# Patient Record
Sex: FEMALE | Race: WHITE | NOT HISPANIC OR LATINO | Employment: OTHER | URBAN - METROPOLITAN AREA
[De-identification: names, ages, dates, MRNs, and addresses within clinical notes are randomized per-mention and may not be internally consistent; named-entity substitution may affect disease eponyms.]

---

## 2020-03-12 ENCOUNTER — HISTORICAL (OUTPATIENT)
Dept: ADMINISTRATIVE | Facility: HOSPITAL | Age: 60
End: 2020-03-12

## 2020-03-15 LAB — FINAL CULTURE: NORMAL

## 2020-06-09 ENCOUNTER — HISTORICAL (OUTPATIENT)
Dept: ENDOSCOPY | Facility: HOSPITAL | Age: 60
End: 2020-06-09

## 2020-06-26 ENCOUNTER — HISTORICAL (OUTPATIENT)
Dept: SURGERY | Facility: HOSPITAL | Age: 60
End: 2020-06-26

## 2020-07-29 ENCOUNTER — HISTORICAL (OUTPATIENT)
Dept: ADMINISTRATIVE | Facility: HOSPITAL | Age: 60
End: 2020-07-29

## 2020-08-06 ENCOUNTER — HISTORICAL (OUTPATIENT)
Dept: RADIOLOGY | Facility: HOSPITAL | Age: 60
End: 2020-08-06

## 2020-10-21 ENCOUNTER — HISTORICAL (OUTPATIENT)
Dept: ADMINISTRATIVE | Facility: HOSPITAL | Age: 60
End: 2020-10-21

## 2021-03-08 ENCOUNTER — HISTORICAL (OUTPATIENT)
Dept: ADMINISTRATIVE | Facility: HOSPITAL | Age: 61
End: 2021-03-08

## 2021-03-08 LAB
APPEARANCE, UA: CLEAR
BACTERIA SPEC CULT: ABNORMAL /HPF
BILIRUB UR QL STRIP: NEGATIVE
COLOR UR: YELLOW
GLUCOSE (UA): NEGATIVE
HGB UR QL STRIP: ABNORMAL
KETONES UR QL STRIP: NEGATIVE
LEUKOCYTE ESTERASE UR QL STRIP: NEGATIVE
NITRITE UR QL STRIP: NEGATIVE
PH UR STRIP: 6.5 [PH] (ref 5–9)
PROT UR QL STRIP: NEGATIVE
RBC #/AREA URNS HPF: 7 /HPF (ref 0–2)
SP GR UR STRIP: 1.02 (ref 1–1.03)
SQUAMOUS EPITHELIAL, UA: ABNORMAL
UROBILINOGEN UR STRIP-ACNC: 0.2
WBC #/AREA URNS HPF: ABNORMAL /[HPF]

## 2021-04-14 ENCOUNTER — HISTORICAL (OUTPATIENT)
Dept: RADIOLOGY | Facility: HOSPITAL | Age: 61
End: 2021-04-14

## 2021-04-16 ENCOUNTER — HISTORICAL (OUTPATIENT)
Dept: ADMINISTRATIVE | Facility: HOSPITAL | Age: 61
End: 2021-04-16

## 2021-04-16 LAB
ABS NEUT (OLG): 2.12 X10(3)/MCL (ref 2.1–9.2)
ALBUMIN SERPL-MCNC: 4.4 GM/DL (ref 3.5–5)
ALBUMIN/GLOB SERPL: 1.8 RATIO (ref 1.1–2)
ALP SERPL-CCNC: 76 UNIT/L (ref 40–150)
ALT SERPL-CCNC: 12 UNIT/L (ref 0–55)
AST SERPL-CCNC: 20 UNIT/L (ref 5–34)
BASOPHILS # BLD AUTO: 0 X10(3)/MCL (ref 0–0.2)
BASOPHILS NFR BLD AUTO: 1 %
BILIRUB SERPL-MCNC: 0.4 MG/DL
BILIRUBIN DIRECT+TOT PNL SERPL-MCNC: 0.2 MG/DL (ref 0–0.5)
BILIRUBIN DIRECT+TOT PNL SERPL-MCNC: 0.2 MG/DL (ref 0–0.8)
BUN SERPL-MCNC: 14.7 MG/DL (ref 9.8–20.1)
CALCIUM SERPL-MCNC: 9.7 MG/DL (ref 8.4–10.2)
CHLORIDE SERPL-SCNC: 109 MMOL/L (ref 98–107)
CHOLEST SERPL-MCNC: 200 MG/DL
CHOLEST/HDLC SERPL: 2 {RATIO} (ref 0–5)
CK SERPL-CCNC: 82 U/L (ref 29–168)
CO2 SERPL-SCNC: 28 MMOL/L (ref 22–29)
CREAT SERPL-MCNC: 0.74 MG/DL (ref 0.55–1.02)
CRP SERPL HS-MCNC: 0.22 MG/DL
EOSINOPHIL # BLD AUTO: 0.2 X10(3)/MCL (ref 0–0.9)
EOSINOPHIL NFR BLD AUTO: 4 %
ERYTHROCYTE [DISTWIDTH] IN BLOOD BY AUTOMATED COUNT: 13.7 % (ref 11.5–17)
ERYTHROCYTE [SEDIMENTATION RATE] IN BLOOD: 5 MM/HR (ref 0–20)
GLOBULIN SER-MCNC: 2.4 GM/DL (ref 2.4–3.5)
GLUCOSE SERPL-MCNC: 82 MG/DL (ref 74–100)
HCT VFR BLD AUTO: 42.3 % (ref 37–47)
HDLC SERPL-MCNC: 92 MG/DL (ref 35–60)
HGB BLD-MCNC: 13.5 GM/DL (ref 12–16)
LDLC SERPL CALC-MCNC: 98 MG/DL (ref 50–140)
LYMPHOCYTES # BLD AUTO: 1.6 X10(3)/MCL (ref 0.6–4.6)
LYMPHOCYTES NFR BLD AUTO: 36 %
MCH RBC QN AUTO: 30.3 PG (ref 27–31)
MCHC RBC AUTO-ENTMCNC: 31.9 GM/DL (ref 33–36)
MCV RBC AUTO: 94.8 FL (ref 80–94)
MONOCYTES # BLD AUTO: 0.4 X10(3)/MCL (ref 0.1–1.3)
MONOCYTES NFR BLD AUTO: 8 %
NEUTROPHILS # BLD AUTO: 2.12 X10(3)/MCL (ref 2.1–9.2)
NEUTROPHILS NFR BLD AUTO: 50 %
PLATELET # BLD AUTO: 303 X10(3)/MCL (ref 130–400)
PMV BLD AUTO: 9.3 FL (ref 9.4–12.4)
POTASSIUM SERPL-SCNC: 4.7 MMOL/L (ref 3.5–5.1)
PROT SERPL-MCNC: 6.8 GM/DL (ref 6.4–8.3)
RBC # BLD AUTO: 4.46 X10(6)/MCL (ref 4.2–5.4)
SODIUM SERPL-SCNC: 145 MMOL/L (ref 136–145)
TRIGL SERPL-MCNC: 48 MG/DL (ref 37–140)
VLDLC SERPL CALC-MCNC: 10 MG/DL
WBC # SPEC AUTO: 4.2 X10(3)/MCL (ref 4.5–11.5)

## 2021-05-07 ENCOUNTER — HISTORICAL (OUTPATIENT)
Dept: RADIOLOGY | Facility: HOSPITAL | Age: 61
End: 2021-05-07

## 2021-06-04 ENCOUNTER — HISTORICAL (OUTPATIENT)
Dept: RADIOLOGY | Facility: HOSPITAL | Age: 61
End: 2021-06-04

## 2021-06-04 ENCOUNTER — HISTORICAL (OUTPATIENT)
Dept: ADMINISTRATIVE | Facility: HOSPITAL | Age: 61
End: 2021-06-04

## 2021-06-04 LAB
ABS NEUT (OLG): 2.33 X10(3)/MCL (ref 2.1–9.2)
APPEARANCE, UA: CLEAR
BACTERIA SPEC CULT: NORMAL /HPF
BASOPHILS # BLD AUTO: 0 X10(3)/MCL (ref 0–0.2)
BASOPHILS NFR BLD AUTO: 0 %
BILIRUB UR QL STRIP: NEGATIVE
COLOR UR: YELLOW
EOSINOPHIL # BLD AUTO: 0.2 X10(3)/MCL (ref 0–0.9)
EOSINOPHIL NFR BLD AUTO: 4 %
ERYTHROCYTE [DISTWIDTH] IN BLOOD BY AUTOMATED COUNT: 13.9 % (ref 11.5–17)
GLUCOSE (UA): NEGATIVE
HCT VFR BLD AUTO: 42 % (ref 37–47)
HGB BLD-MCNC: 13.6 GM/DL (ref 12–16)
HGB UR QL STRIP: NEGATIVE
KETONES UR QL STRIP: NEGATIVE
LEUKOCYTE ESTERASE UR QL STRIP: NEGATIVE
LYMPHOCYTES # BLD AUTO: 1.6 X10(3)/MCL (ref 0.6–4.6)
LYMPHOCYTES NFR BLD AUTO: 35 %
MCH RBC QN AUTO: 29.8 PG (ref 27–31)
MCHC RBC AUTO-ENTMCNC: 32.4 GM/DL (ref 33–36)
MCV RBC AUTO: 91.9 FL (ref 80–94)
MONOCYTES # BLD AUTO: 0.4 X10(3)/MCL (ref 0.1–1.3)
MONOCYTES NFR BLD AUTO: 9 %
NEUTROPHILS # BLD AUTO: 2.33 X10(3)/MCL (ref 2.1–9.2)
NEUTROPHILS NFR BLD AUTO: 52 %
NITRITE UR QL STRIP: NEGATIVE
PH UR STRIP: 6 [PH] (ref 5–9)
PLATELET # BLD AUTO: 326 X10(3)/MCL (ref 130–400)
PMV BLD AUTO: 9.6 FL (ref 9.4–12.4)
PROT UR QL STRIP: NEGATIVE
RBC # BLD AUTO: 4.57 X10(6)/MCL (ref 4.2–5.4)
RBC #/AREA URNS HPF: NORMAL /[HPF]
SP GR UR STRIP: 1 (ref 1–1.03)
SQUAMOUS EPITHELIAL, UA: NORMAL /HPF (ref 0–4)
UROBILINOGEN UR STRIP-ACNC: 0.2
WBC # SPEC AUTO: 4.5 X10(3)/MCL (ref 4.5–11.5)
WBC #/AREA URNS HPF: NORMAL /[HPF]

## 2021-06-07 ENCOUNTER — HISTORICAL (OUTPATIENT)
Dept: RADIOLOGY | Facility: HOSPITAL | Age: 61
End: 2021-06-07

## 2021-06-09 ENCOUNTER — HISTORICAL (OUTPATIENT)
Dept: ADMINISTRATIVE | Facility: HOSPITAL | Age: 61
End: 2021-06-09

## 2021-06-09 LAB
ABS NEUT (OLG): 3.54 X10(3)/MCL (ref 2.1–9.2)
ALBUMIN SERPL-MCNC: 4.4 GM/DL (ref 3.5–5)
ALBUMIN/GLOB SERPL: 1.6 RATIO (ref 1.1–2)
ALP SERPL-CCNC: 72 UNIT/L (ref 40–150)
ALT SERPL-CCNC: 11 UNIT/L (ref 0–55)
AMYLASE SERPL-CCNC: 115 UNIT/L (ref 25–125)
APPEARANCE, UA: CLEAR
AST SERPL-CCNC: 19 UNIT/L (ref 5–34)
BACTERIA SPEC CULT: NORMAL /HPF
BASOPHILS # BLD AUTO: 0 X10(3)/MCL (ref 0–0.2)
BASOPHILS NFR BLD AUTO: 1 %
BILIRUB SERPL-MCNC: 0.3 MG/DL
BILIRUB UR QL STRIP: NEGATIVE
BILIRUBIN DIRECT+TOT PNL SERPL-MCNC: 0.1 MG/DL (ref 0–0.8)
BILIRUBIN DIRECT+TOT PNL SERPL-MCNC: 0.2 MG/DL (ref 0–0.5)
BUN SERPL-MCNC: 13.4 MG/DL (ref 9.8–20.1)
CALCIUM SERPL-MCNC: 10.7 MG/DL (ref 8.4–10.2)
CHLORIDE SERPL-SCNC: 102 MMOL/L (ref 98–107)
CO2 SERPL-SCNC: 30 MMOL/L (ref 22–29)
COLOR UR: YELLOW
CREAT SERPL-MCNC: 0.81 MG/DL (ref 0.55–1.02)
DEPRECATED CALCIDIOL+CALCIFEROL SERPL-MC: 37.9 NG/ML (ref 30–80)
EOSINOPHIL # BLD AUTO: 0.2 X10(3)/MCL (ref 0–0.9)
EOSINOPHIL NFR BLD AUTO: 2 %
ERYTHROCYTE [DISTWIDTH] IN BLOOD BY AUTOMATED COUNT: 13.8 % (ref 11.5–17)
GLOBULIN SER-MCNC: 2.8 GM/DL (ref 2.4–3.5)
GLUCOSE (UA): NEGATIVE
GLUCOSE SERPL-MCNC: 85 MG/DL (ref 74–100)
HCT VFR BLD AUTO: 42.7 % (ref 37–47)
HGB BLD-MCNC: 14.1 GM/DL (ref 12–16)
HGB UR QL STRIP: NEGATIVE
KETONES UR QL STRIP: NEGATIVE
LEUKOCYTE ESTERASE UR QL STRIP: NEGATIVE
LIPASE SERPL-CCNC: 53 U/L
LYMPHOCYTES # BLD AUTO: 1.8 X10(3)/MCL (ref 0.6–4.6)
LYMPHOCYTES NFR BLD AUTO: 29 %
MCH RBC QN AUTO: 30.3 PG (ref 27–31)
MCHC RBC AUTO-ENTMCNC: 33 GM/DL (ref 33–36)
MCV RBC AUTO: 91.8 FL (ref 80–94)
MONOCYTES # BLD AUTO: 0.6 X10(3)/MCL (ref 0.1–1.3)
MONOCYTES NFR BLD AUTO: 10 %
NEUTROPHILS # BLD AUTO: 3.54 X10(3)/MCL (ref 2.1–9.2)
NEUTROPHILS NFR BLD AUTO: 58 %
NITRITE UR QL STRIP: NEGATIVE
PH UR STRIP: 5.5 [PH] (ref 5–9)
PLATELET # BLD AUTO: 278 X10(3)/MCL (ref 130–400)
PMV BLD AUTO: 10.4 FL (ref 9.4–12.4)
POTASSIUM SERPL-SCNC: 4.5 MMOL/L (ref 3.5–5.1)
PROT SERPL-MCNC: 7.2 GM/DL (ref 6.4–8.3)
PROT UR QL STRIP: NEGATIVE
PTH-INTACT SERPL-MCNC: 116.6 PG/ML (ref 8.7–77.1)
RBC # BLD AUTO: 4.65 X10(6)/MCL (ref 4.2–5.4)
RBC #/AREA URNS HPF: NORMAL /[HPF]
SODIUM SERPL-SCNC: 141 MMOL/L (ref 136–145)
SP GR UR STRIP: 1.02 (ref 1–1.03)
SQUAMOUS EPITHELIAL, UA: NORMAL /HPF (ref 0–4)
UROBILINOGEN UR STRIP-ACNC: 0.2
WBC # SPEC AUTO: 6.2 X10(3)/MCL (ref 4.5–11.5)
WBC #/AREA URNS HPF: NORMAL /[HPF]

## 2021-06-10 ENCOUNTER — HISTORICAL (OUTPATIENT)
Dept: ADMINISTRATIVE | Facility: HOSPITAL | Age: 61
End: 2021-06-10

## 2021-06-10 LAB — HEMOCCULT SP1 STL QL: NEGATIVE

## 2021-06-12 LAB — FINAL CULTURE: NORMAL

## 2021-06-18 ENCOUNTER — HISTORICAL (OUTPATIENT)
Dept: RADIOLOGY | Facility: HOSPITAL | Age: 61
End: 2021-06-18

## 2021-06-18 LAB — BMD RECOMMENDATION EXT: NORMAL

## 2021-07-12 ENCOUNTER — HISTORICAL (OUTPATIENT)
Dept: ADMINISTRATIVE | Facility: HOSPITAL | Age: 61
End: 2021-07-12

## 2021-07-26 ENCOUNTER — HISTORICAL (OUTPATIENT)
Dept: RADIOLOGY | Facility: HOSPITAL | Age: 61
End: 2021-07-26

## 2021-08-06 ENCOUNTER — HISTORICAL (OUTPATIENT)
Dept: ADMINISTRATIVE | Facility: HOSPITAL | Age: 61
End: 2021-08-06

## 2021-08-06 LAB
APPEARANCE, UA: CLEAR
BACTERIA SPEC CULT: ABNORMAL /HPF
BILIRUB UR QL STRIP: NEGATIVE
COLOR UR: YELLOW
GLUCOSE (UA): NEGATIVE
HGB UR QL STRIP: NEGATIVE
KETONES UR QL STRIP: NEGATIVE
LEUKOCYTE ESTERASE UR QL STRIP: ABNORMAL
NITRITE UR QL STRIP: NEGATIVE
PH UR STRIP: 7.5 [PH] (ref 5–9)
PROT UR QL STRIP: NEGATIVE
RBC #/AREA URNS HPF: ABNORMAL /[HPF]
SP GR UR STRIP: 1.01 (ref 1–1.03)
SQUAMOUS EPITHELIAL, UA: ABNORMAL /HPF (ref 0–4)
UROBILINOGEN UR STRIP-ACNC: 0.2
WBC #/AREA URNS HPF: ABNORMAL /[HPF]

## 2021-08-10 ENCOUNTER — HISTORICAL (OUTPATIENT)
Dept: ADMINISTRATIVE | Facility: HOSPITAL | Age: 61
End: 2021-08-10

## 2021-08-10 LAB — POC CREATININE: 0.8 MG/DL (ref 0.6–1.3)

## 2021-09-27 ENCOUNTER — HISTORICAL (OUTPATIENT)
Dept: ADMINISTRATIVE | Facility: HOSPITAL | Age: 61
End: 2021-09-27

## 2021-12-05 LAB
PAP RECOMMENDATION EXT: NORMAL
PAP SMEAR: NORMAL

## 2022-04-10 ENCOUNTER — HISTORICAL (OUTPATIENT)
Dept: ADMINISTRATIVE | Facility: HOSPITAL | Age: 62
End: 2022-04-10

## 2022-04-26 VITALS
WEIGHT: 128.31 LBS | OXYGEN SATURATION: 99 % | SYSTOLIC BLOOD PRESSURE: 120 MMHG | BODY MASS INDEX: 24.22 KG/M2 | DIASTOLIC BLOOD PRESSURE: 87 MMHG | HEIGHT: 61 IN

## 2022-04-28 ENCOUNTER — HISTORICAL (OUTPATIENT)
Dept: ADMINISTRATIVE | Facility: HOSPITAL | Age: 62
End: 2022-04-28

## 2022-04-30 NOTE — OP NOTE
DATE OF SURGERY:        SURGEON:  Abelardo Ortiz MD    PREOPERATIVE DIAGNOSIS:  Right renal calculus, 1 cm.    POSTOPERATIVE DIAGNOSIS:  Right renal calculus, 1 cm.    PROCEDURE PERFORMED:  Extracorporeal shock wave lithotripsy, 2500 shocks.    ANESTHESIA:  General.    ESTIMATED BLOOD LOSS:  Minimal.    CONDITION:  Stable.    PROCEDURE IN DETAIL:  The patient was placed on lithotripsy unit and placed under anesthesia.  The stone was localized.  It was re-localized several times during the procedure, and she was delivered a total of 2500 shocks.  The stone appeared to break up well, and she was awoken and returned to the recovery room.        ______________________________  Abelardo Ortiz MD    TJCAROL/SK  DD:  06/26/2020  Time:  09:47AM  DT:  06/26/2020  Time:  10:36AM  Job #:  [id number]

## 2022-04-30 NOTE — PROGRESS NOTES
Patient:   Eusebia Miramontes            MRN: [id number]            FIN: [id number]               Age:   59 years     Sex:  Female     :  1960   Associated Diagnoses:   Acute neck pain; Acute low back pain; Disc disease, degenerative, cervical; Myelopathy of cervical spinal cord with cervical radiculopathy; Adolescent idiopathic scoliosis, thoracolumbar region   Author:   Regan Dubon MD      Chief Complaint       2020 9:28 CDT       NECK AND LOW BACK PAIN AND THORACIC PAIN, SHE STATES SHE HAS HAD THE PAIN FOR SEVERAL YEARS AND HAS GOTTON WORSE, SHE HASNN'T SEEN A DRKarina FOR THIS PAIN.  (Modified)   2020 9:12 CDT       NCK AND LOW BACK PAIN        History of Present Illness             The patient presents with back pain.     She is complaining of severe neck thoracic and lower back pain.  She has had this for some time.  Back in 2000 she saw Dr. Herzog and he told her that C5-6 disc was completely destroyed.  He had cervical injections.  Then she lost her insurance or had a very high deductible and was unable to afford further treatment.    Today she is complaining of severe neck and lower back pain along with bilateral arm and leg numbness and weakness.  She states by the end of the day she cannot stand up to even do any kitchen work.  She has no balance problems.  She has numbness and tingling in both hands and arms.  This worsens with activity and worsens as the day goes on.  She does have kidney stones but no bowel or bladder problems.  She denies any balance problems.    She also has fibromyalgia but has not seen a rheumatologist in some time for the same reasons.  She states that all of her joints hurt.  Otherwise she is healthy.      Review of Systems   Constitutional:  Decreased activity, No fever, No chills.    Eye:  Recent visual problem.    Ear/Nose/Mouth/Throat:  Negative except as documented in history of present illness.    Respiratory:  No shortness of breath, No cough.     Cardiovascular:  No chest pain, No peripheral edema, No syncope.    Gastrointestinal:  No nausea, No vomiting, No diarrhea, No constipation, No heartburn, No abdominal pain.    Genitourinary:  No dysuria, No hematuria, No urethral discharge.    Hematology/Lymphatics:  No bruising tendency, No bleeding tendency.    Endocrine:  No polyuria.    Immunologic:  Immunocompromised, Recurrent fevers.    Musculoskeletal:  No muscle pain, No claudication.    Integumentary:  No rash, No petechiae, No skin lesion.    Neurologic:  Alert and oriented X4, No abnormal balance, No numbness, No tingling.    Psychiatric:  No anxiety, No depression.    ROS reviewed as documented in chart      Health Status   Allergies:    Allergic Reactions (Selected)  No Known Allergies,    Allergies (1) Active Reaction  No Known Allergies None Documented     Current medications:  (Selected)   Outpatient Medications  Pending Complete  midazolam: 2 mg, form: Injection, IV Push, q5min, Order duration: 2 dose(s), first dose 06/26/20 7:05:00 CDT, stop date 06/26/20 7:14:00 CDT, (up to 5 mg for moderate anxiety)  Prescriptions  Prescribed  Cymbalta 30 mg oral delayed release capsule: 30 mg = 1 cap(s), Oral, Once a day (at bedtime), (do not crush or chew), # 30 cap(s), 5 Refill(s), Pharmacy: [facility], 159, cm, Height/Length Dosing, 03/10/20 16:34:00 CDT, 61.5, kg, Weight Dosing, 03/10/20 16:34:00 CDT  DULoxetine 60 mg oral delayed release capsule: 60 mg = 1 cap(s), Oral, Daily, # 30 cap(s), 5 Refill(s), Pharmacy: [facility], 159, cm, Height/Length Dosing, 03/10/20 16:34:00 CDT, 61.5, kg, Weight Dosing, 03/10/20 16:34:00 CDT  amlodipine 5 mg oral tablet: See Instructions, TAKE 1 TABLET BY MOUTH ONCE DAILY, # 30 tab(s), 6 Refill(s), Pharmacy: [facility], 159, cm, Height/Length Dosing, 03/10/20 16:34:00 CDT, 61.5, kg, Weight Dosing, 03/10/20 16:34:00 CDT  celecoxib 200 mg oral capsule: 200 mg = 1 cap(s), Oral, Daily, # 30  cap(s), 5 Refill(s), Pharmacy: [facility], 159, cm, Height/Length Dosing, 03/10/20 16:34:00 CDT, 61.5, kg, Weight Dosing, 03/10/20 16:34:00 CDT  clorazepate 15 mg oral tablet: 15 mg = 1 tab(s), Oral, qPM, PRN PRN as needed for insomnia, # 20 tab(s), 5 Refill(s)  metaxalone 800 mg oral tablet: 800 mg = 1 tab(s), Oral, At Bedtime, # 30 tab(s), 5 Refill(s), Pharmacy: [facility], 159, cm, Height/Length Dosing, 03/10/20 16:34:00 CDT, 61.5, kg, Weight Dosing, 03/10/20 16:34:00 CDT  montelukast 10 mg oral TABLET: 10 mg = 1 tab(s), Oral, Daily, # 30 tab(s), 5 Refill(s), Pharmacy: [facility], 159, cm, Height/Length Dosing, 03/10/20 16:34:00 CDT, 61.5, kg, Weight Dosing, 03/10/20 16:34:00 CDT  tiZANidine 4 mg oral tablet: 4 mg = 1 tab(s), Oral, qPM, # 30 tab(s), 5 Refill(s), Pharmacy: [facility], 159, cm, Height/Length Dosing, 03/10/20 16:34:00 CDT, 61.5, kg, Weight Dosing, 03/10/20 16:34:00 CDT  traMADol 50 mg oral tablet: 50 mg = 1 tab(s), Oral, q6-8hr, # 30 tab(s), 1 Refill(s)  Documented Medications  Documented  FiberCon: Oral, Daily, 0 Refill(s)  Multiple Vitamins oral capsule: 1 cap(s), Oral, BID, 0 Refill(s)  Pravastatin 20 mg Oral Tab: 20 mg = 1 tab(s), Oral, Daily, # 30 tab(s), 0 Refill(s)  Pravastatin 40 mg Oral Tab: 40 mg = 1 tab(s), Oral, Daily, # 30 tab(s), 0 Refill(s)  Probiotic Formula (Bacillus Coagulans) oral capsule: 1 cap(s), Oral, Daily, # 30 cap(s), 0 Refill(s)  Vitamin B12: Oral, Daily, 0 Refill(s)  Vitamin D3 2000 intl units (50 mcg) oral tablet: Oral, Daily, 0 Refill(s)  cefdinir 300 mg oral capsule: 300 mg = 1 cap(s), Oral, BID  clonazePAM 1 mg oral tablet: 1 mg = 1 tab(s), Oral, Daily  nitrofurantoin macrocrystals 100 mg oral capsule: 100 mg = 1 cap(s), Oral, Daily  omeprazole 20 mg oral DR capsule: 20 mg = 1 cap(s), Oral, Daily, 0 Refill(s)  phenazopyridine 200 mg oral tablet: 200 mg = 1 tab(s), Oral, q8hr  sulfamethoxazole-trimethoprim 800 mg-160 mg oral  tablet: 1 tab(s), Oral, BID,    Home Medications (22) Active  amlodipine 5 mg oral tablet See Instructions  cefdinir 300 mg oral capsule 300 mg = 1 cap(s), Oral, BID  celecoxib 200 mg oral capsule 200 mg = 1 cap(s), Oral, Daily  clonazePAM 1 mg oral tablet 1 mg = 1 tab(s), Oral, Daily  clorazepate 15 mg oral tablet 15 mg = 1 tab(s), PRN, Oral, qPM  Cymbalta 30 mg oral delayed release capsule 30 mg = 1 cap(s), Oral, Once a day (at bedtime)  DULoxetine 60 mg oral delayed release capsule 60 mg = 1 cap(s), Oral, Daily  FiberCon , Oral, Daily  metaxalone 800 mg oral tablet 800 mg = 1 tab(s), Oral, At Bedtime  montelukast 10 mg oral TABLET 10 mg = 1 tab(s), Oral, Daily  Multiple Vitamins oral capsule 1 cap(s), Oral, BID  nitrofurantoin macrocrystals 100 mg oral capsule 100 mg = 1 cap(s), Oral, Daily  omeprazole 20 mg oral DR capsule 20 mg = 1 cap(s), Oral, Daily  phenazopyridine 200 mg oral tablet 200 mg = 1 tab(s), Oral, q8hr  Pravastatin 20 mg Oral Tab 20 mg = 1 tab(s), Oral, Daily  Pravastatin 40 mg Oral Tab 40 mg = 1 tab(s), Oral, Daily  Probiotic Formula (Bacillus Coagulans) oral capsule 1 cap(s), Oral, Daily  sulfamethoxazole-trimethoprim 800 mg-160 mg oral tablet 1 tab(s), Oral, BID  tiZANidine 4 mg oral tablet 4 mg = 1 tab(s), Oral, qPM  traMADol 50 mg oral tablet 50 mg = 1 tab(s), Oral, q6-8hr  Vitamin B12 , Oral, Daily  Vitamin D3 2000 intl units (50 mcg) oral tablet , Oral, Daily     Problem list:    All Problems  Memory loss / SNOMED CT 00945881 / Confirmed  Hypertension / SNOMED CT 6625065 / Confirmed  Chronic cystitis / SNOMED CT 43637886 / Confirmed  Fibromyalgia / SNOMED CT 44109027 / Confirmed  Valvular heart disease / SNOMED CT 2802151 / Confirmed  saw Dr. Wei; last visit last wk.  Hyperlipidemia / SNOMED CT 20629888 / Confirmed  Kidney stones / SNOMED CT 582850399 / Confirmed  Lupus / SNOMED CT 747020804 / Confirmed  DJD (degenerative joint disease) / SNOMED CT 9701430619 / Confirmed  Wellness  examination / SNOMED CT 416535251 / Confirmed  Rectal bleeding / SNOMED CT 977799027 / Confirmed  Resolved: SLE (systemic lupus erythematosus) / SNOMED CT [id number]  Canceled: Chronic cystitis / SNOMED CT 39688578,    Active Problems (11)  Chronic cystitis   DJD (degenerative joint disease)   Fibromyalgia   Hyperlipidemia   Hypertension   Kidney stones   Lupus   Memory loss   Rectal bleeding   Valvular heart disease   Wellness examination         Histories   Past Medical History:    Active  Fibromyalgia (44509275)  Resolved  SLE (systemic lupus erythematosus) ([id number]):  Resolved.   Family History:    Heart attack.  Father ()  Hypertension.  Mother     Procedure history:    Lithotripsy (Right) on 2020 at 59 Years.  Comments:  2020 8:40 Pinky Banuelos RN  auto-populated from documented surgical case  Colonoscopy (None) on 2020 at 59 Years.  Comments:  2020 14:42 Lexx Winchester RN  auto-populated from documented surgical case  Cryosurgery Anal Lesion (None) on 2020 at 59 Years.  Comments:  2020 14:42 Lexx Winchester RN  auto-populated from documented surgical case  buttock lesion in 2016 at 56 Years.  Tubal ligation (042849928) in  at 27 Years.  Tonsillectomy (602226091) in  at 8 Years.   Social History        Social & Psychosocial Habits    Alcohol  03/10/2020  Use: Current    Type: Wine    Frequency: 3-5 times per week    Employment/School  03/10/2020  Status: Employed    Exercise  03/10/2020  Times per week: 1-2 times/week    Exercise type: Walking    Home/Environment  03/10/2020  Lives with: Children    Nutrition/Health  03/10/2020  Home Diet Regular    Substance Use  03/10/2020  Use: Never    Tobacco  2020  Use: Former smoker, quit more    Patient Wants Consult For Cessation Counseling N/A    Concerns about tobacco use in household: No    Started at age: 20 Years    Stopped at age: 30  Years    Abuse/Neglect  07/29/2020  SHX Any signs of abuse or neglect No    Feels unsafe at home: No    Safe place to go: Yes    Spiritual/Cultural  06/23/2020  Catholic Preference Non denomination  .        Physical Examination   Vital Signs   7/29/2020 9:12 CDT       Temperature Oral          98.2 degF                             Temperature Oral (calculated)             208.76 DegF                             Systolic Blood Pressure   132 mmHg                             Diastolic Blood Pressure  95 mmHg  HI     Measurements from flowsheet : Measurements   7/29/2020 9:12 CDT       Weight Dosing             59.420 kg                             Weight Measured           59.42 kg                             Weight Measured and Calculated in Lbs     131.00 lb                             Weight Estimated          59.42 kg                             Height/Length Dosing      160.00 cm                             Height/Length Measured    160 cm                             Height/Length Estimated   160 cm                             BSA Measured              1.63 m2                             BSA Estimated             1.63 m2                             Body Mass Index Estimated 23.21 kg/m2                             Body Mass Index Measured  23.21 kg/m2     General:  No acute distress.    Eye:  Normal conjunctiva.    HENT:  Normal hearing.    Neck:  Examination of the cervical spine.  She has tenderness to palpation from approximately mid cervical region bilaterally all the way down to mid scapular region.  There is tenderness over the trapezius.  She has full chin to chest flexion.  She has limited extension and lateral bending.  Biceps, triceps, brachial radialis reflexes are brisk +3 and symmetrical.  Shoulder girdle, biceps, triceps, wrist extension flexor strength are normal and symmetrical.  Tinel signs are positive bilaterally at both carpal tunnels but not at the forearm Guyon's canal.  Sensation is equal  to light touch in both upper extremities.    .    Respiratory:  Respirations are non-labored.    Cardiovascular:  Normal rate.    Gastrointestinal:  Soft, Non-tender.    Genitourinary:  No costovertebral angle tenderness.    Lymphatics:  No lymphadenopathy neck, axilla, groin.    Musculoskeletal:  Examination of the thoracic and lumbar spine there is a right rib elevation on forward flexion.  Shoulders are equal.  She has tenderness to palpation from approximately L1 down to both SI joints.  She flexes fingertips to below her knees but has limited extension and mid range lateral bending.  Knee jerks are +4 symmetrical ankle jerks are +4 and symmetrical.  She has 3 beat clonus bilaterally Babinski is normal.  Dorsalis pedis posterior tibial pulses are +2 and symmetrical.  Sitting straight leg raising is negative.  Supine straight leg raising is positive for bilateral hip pain negative for radicular symptoms or back pain.  Flynn test is positive for hip pain but no real back pain.  Sensation is equal to light touch in both lower extremities..    Lumbar and sacral spine palpation   Lumbar spine range of motion   Lumbar spine, pelvis, and hip special testing   L- Spine, Pelvis and Hip neurologic testing   Integumentary:  Warm, Dry.    Neurologic:  Alert, Oriented, Normal sensory, Cranial Nerves II-XII are grossly intact, Romberg is negative..    Psychiatric:  Cooperative.       Health Maintenance      Health Maintenance     Pending (in the next year)        Due            ADL Screening due  07/29/20  and every 1  year(s)           Aspirin Therapy for CVD Prevention due  07/29/20  and every 1  year(s)           Breast Cancer Screening due  07/29/20  and every            Hypertension Management-Education due  07/29/20  and every 1  year(s)           Influenza Vaccine due  07/29/20  and every            Tetanus Vaccine due  07/29/20  and every 10  year(s)           Zoster Vaccine due  07/29/20  and every         Due In  Future            Obesity Screening not due until  01/01/21  and every 1  year(s)           Alcohol Misuse Screening not due until  01/02/21  and every 1  year(s)           Blood Pressure Screening not due until  03/10/21  and every 1  year(s)           Depression Screening not due until  03/10/21  and every 1  year(s)           Hypertension Management-Blood Pressure not due until  03/10/21  and every 1  year(s)           Hypertension Management-BMP not due until  03/12/21  and every 1  year(s)           Body Mass Index Check not due until  06/23/21  and every 1  year(s)     Satisfied (in the past 1 year)        Satisfied            Alcohol Misuse Screening on  03/10/20.  Satisfied by Enma Metcalf LPN           Blood Pressure Screening on  07/29/20.  Satisfied by Romy Schneider           Body Mass Index Check on  07/29/20.  Satisfied by Romy Schneider           Cervical Cancer Screening on  08/21/19.  Satisfied by Alissa Magdaleno           Depression Screening on  03/10/20.  Satisfied by Enma Metcalf LPN           Diabetes Screening on  03/12/20.  Satisfied by Barbi Travis           Hypertension Management-Blood Pressure on  07/29/20.  Satisfied by Romy Schneider           Influenza Vaccine on  03/10/20.  Satisfied by Enma Metcalf LPN           Lipid Screening on  03/12/20.  Satisfied by Barbi Travis           Obesity Screening on  07/29/20.  Satisfied by Romy Schneider          Review / Management   Results review   Radiology results   5 views of the cervical spine.  There is a straightening of the normal cervical lordosis with almost a slight kyphosis starting about C4-5.  There is significant spondylosis degenerative disc disease at C4-5 C5-6 and C6-7.    Scoliosis views show a long thoracolumbar double curve.  The primary curve is lumbar however this is small.    5 views of the lumbar spine are normal.    AP of the pelvis is normal.    Report of a cervical MRI from 2015 or so there is significant degenerative  disc disease at C5-6 with moderate cervical stenosis., Rad Results (ST)          Impression and Plan   Diagnosis     Acute neck pain (CVU19-BN M54.2).     Acute low back pain (KKH26-VH M54.5).     Disc disease, degenerative, cervical (YVI60-VL M50.30).     Myelopathy of cervical spinal cord with cervical radiculopathy (NCD20-WZ M47.12).     Adolescent idiopathic scoliosis, thoracolumbar region (FYJ20-RV M41.125).     Plan:  With the hyperreflexia and clonus along with her complaints and a history of cervical stenosis 5 or 6 years ago we are ordering an MRI of the cervical spine.  I discussed with her that she does have neurologic symptoms that will probably lead to cervical surgery.  She understands this and I will see her back next week once the MRI is performed.  Should symptoms worsen she will go to the emergency room or call here sooner.    Components of this note were documented using voice recognition systems and are subject to errors not corrected at proof reading.  Please contact the author for any clarifications..

## 2022-04-30 NOTE — OP NOTE
DATE OF SURGERY:        SURGEON:  Lexx Sainz MD    PREOPERATIVE DIAGNOSIS:  Rectal bleeding.    POSTOPERATIVE DIAGNOSIS:  Normal colonoscopy.    PROCEDURE:  Colonoscopy.    DESCRIPTION OF PROCEDURE:  Patient taken to the endoscopy suite, placed in lateral decubitus position.  IV sedation was given.  Colonoscope was introduced per rectum and advanced with mild difficulty to the cecal area.  Scope was removed slowly.  Cecum, ascending, transverse, descending, sigmoid, and rectum were examined carefully.  No polyps, AV malformations or mass were identified.  Scope was removed from the rectum.  The patient tolerated the procedure well, was brought to recovery in stable condition.        ______________________________  Lexx Sainz MD    KJC/UN  DD:  06/11/2020  Time:  12:13PM  DT:  06/11/2020  Time:  01:24PM  Job #:  [id number]    cc: Lexx Sainz MD

## 2022-04-30 NOTE — OP NOTE
DATE OF SURGERY:        SURGEON:  Lexx Sainz MD    PREOPERATIVE DIAGNOSIS:  Bleeding internal hemorrhoidal disease.    POSTOPERATIVE DIAGNOSIS:  Bleeding internal hemorrhoidal disease.    PROCEDURE:  Hemorrhoidal banding.    DESCRIPTION OF PROCEDURE:  After completion of colonoscopic examination, anoscope was introduced per rectum and hemorrhoidal banding was applied in 3 quadrants.  Good response was obtained.  The patient tolerated the procedure well, was brought to recovery in stable condition.        ______________________________  Lexx Sainz MD    KJC/UN  DD:  06/11/2020  Time:  12:14PM  DT:  06/11/2020  Time:  01:34PM  Job #:  [id number]    cc: Lexx Sainz MD

## 2022-05-03 NOTE — HISTORICAL [facility]ER
This is a historical note converted from [place]iles. Formatting and pictures may have been removed.  Please reference Giles for original formatting and attached multimedia. Chief Complaint  B/L KNEE PAINSHE STATES SHE HAS HAD THE PAIN FOR YEARS, IT COMES AND GOES SHE DOES HAVE FIRBROMYALGIA.  History of Present Illness  She returns today complaining of bilateral knee pain.? She has no popping catching or giving way that hurt after she stands on them for some time. ?She does have fibromyalgia and saw Dr. Dailey today who gave her?a shot of cortisone in her hip.? She does take Celebrex 200 mg once a day.? She has no history of trauma to her knees.  ?  In relation to her cervical spine?she is scared to have any type of surgery done even though we know she has?cervical spinal stenosis.? She complains more of her lower back?but she states that all of her joints have been hurting for the last few weeks.  Review of Systems  Comprehensive Review of Systems performed with no exceptions other than as noted in HPI.  ?  ?  Physical Exam  Vitals & Measurements  T:?98.3? ?F (Oral)? BP:?133/87?  HT:?160.00?cm? WT:?59.420?kg? BMI:?23.21?  Examination of the left knee.? Neurovascularly she is intact. ?Is stable to varus valgus anterior drawer posterior drawer Lachman Nasrin pivot shift?Maral is negative. ?Although she does complain?plane of pain throughout the range of motion there is really no crepitance.? She has a full range of motion.? On palpation she complains of pain?over the medial and lateral joint line over the medial femoral condyle and the medial tibial plateau.  ?  Examination of the right knee neurovascularly she is intact. ?It is stable to varus valgus anterior drawer posterior drawer Lachmans and pivot shift Maral is negative?she does not have any crepitance. ?She does have a?full range of motion but with complaints of pain.? She complains to?palpation of the medial joint line the lateral joint line  lateral?femoral condyle?the medial tibial plateau.  Assessment/Plan  1.?Fibromyalgia?M79.7  ?I discussed with her her findings since she had the systemic injection today I would hold off on injecting both knees.? We are increasing her Celebrex to 200 mg twice a day.? If she gets no relief from the injection and the increase of Celebrex she will call me back in 3 weeks and will consider injecting both knees with Celestone.? She agrees?and will call back in 3 weeks if no improvement.  ?  Components of this note were documented using voice recognition systems and are subject to errors not corrected at proof reading. ?Please contact the author for any clarifications.  Ordered:  celecoxib, 200 mg = 1 cap(s), Oral, BID, # 60 cap(s), 3 Refill(s), Pharmacy: [facility], 160, cm, Height/Length Dosing, 10/21/20 13:13:00 CDT, 59.42, kg, Weight Dosing, 10/21/20 13:13:00 CDT  Office/Outpatient Visit Level 3 Established [id number] PC, Knee pain  Fibromyalgia, [facility], 10/21/20 13:47:00 CDT  ?  Orders:  Clinic Follow-up PRN, 10/21/20 13:47:00 CDT, Future Order, [facility]  XR Knee Left 4 or More Views, Routine, 10/21/20 13:17:00 CDT, None, Ambulatory, Rad Type, Knee pain, Not Scheduled, 10/21/20 13:17:00 CDT  XR Knee Right 4 or More Views, Routine, 10/21/20 13:16:00 CDT, None, Ambulatory, Rad Type, Knee pain, Not Scheduled, 10/21/20 13:16:00 CDT  Referrals  Clinic Follow-up PRN, 10/21/20 13:47:00 CDT, Future Order, [facility]   Problem List/Past Medical History  Ongoing  NEMA positive  Chronic cystitis  Disc disease, degenerative, cervical  DJD (degenerative joint disease)  Fibromyalgia  Hyperlipidemia  Hypertension  Kidney stones  Lupus  Memory loss  Myelopathy of cervical spinal cord with cervical radiculopathy  Rectal bleeding  Scoliosis of thoracolumbar spine  Screening mammogram, encounter for  Valvular heart disease  Wellness examination  Historical  SLE (systemic lupus erythematosus)  Procedure/Surgical  History  Fragmentation in Right Kidney Pelvis, External Approach (06/26/2020)  Lithotripsy (Right) (06/26/2020)  Lithotripsy, extracorporeal shock wave (06/26/2020)  Colonoscopy (None) (06/09/2020)  Colonoscopy, flexible; diagnostic, including collection of specimen(s) by brushing or washing, when performed (separate procedure) (06/09/2020)  Cryosurgery Anal Lesion (None) (06/09/2020)  Hemorrhoidectomy, internal, by rubber band ligation(s) (06/09/2020)  Inspection of Lower Intestinal Tract, Via Natural or Artificial Opening Endoscopic (06/09/2020)  Occlusion of Hemorrhoidal Plexus with Extraluminal Device, Percutaneous Endoscopic Approach (06/09/2020)  buttock lesion (2016)  Tubal ligation (1987)  Tonsillectomy (1968)   Medications  amlodipine 5 mg oral tablet, See Instructions  Bactrim Oral Suspension 200mg-40mg / 5 mL, 5 mL, Oral, BID  CeleBREX 200 mg oral capsule, 200 mg= 1 cap(s), Oral, BID, 3 refills  celecoxib 200 mg oral capsule, 200 mg= 1 cap(s), Oral, Daily, 5 refills  clonazePAM 1 mg oral tablet, 1 mg= 1 tab(s), Oral, Daily  clorazepate 15 mg oral tablet, 15 mg= 1 tab(s), Oral, qPM, PRN, 5 refills  DULoxetine 30 mg oral delayed release capsule, See Instructions, 5 refills  DULoxetine 60 mg oral delayed release capsule, 60 mg= 1 cap(s), Oral, Daily, 5 refills  metaxalone 800 mg oral tablet, 800 mg= 1 tab(s), Oral, At Bedtime, 5 refills  montelukast 10 mg oral TABLET, 10 mg= 1 tab(s), Oral, Daily, 5 refills  Multiple Vitamins oral capsule, 1 cap(s), Oral, BID  omeprazole 20 mg oral DR capsule, 20 mg= 1 cap(s), Oral, Daily  Pravastatin 40 mg Oral Tab, 40 mg= 1 tab(s), Oral, Daily  Probiotic Formula (Bacillus Coagulans) oral capsule, 1 cap(s), Oral, Daily  tiZANidine 4 mg oral tablet, See Instructions, 5 refills  Vitamin B12, Oral, Daily  Vitamin D3 2000 intl units (50 mcg) oral tablet, Oral, Daily  Allergies  No Known Allergies  Social History  Abuse/Neglect  No, No, Yes, 10/21/2020  Alcohol  Current, Wine,  3-5 times per week, 03/10/2020  Employment/School  Employed, 03/10/2020  Exercise  Exercise frequency: 1-2 times/week. Exercise type: Walking., 03/10/2020  Home/Environment  Lives with Children., 03/10/2020  Nutrition/Health  Regular, 03/10/2020  Spiritual/Cultural  Non denomination, 06/23/2020  Substance Use  Never, 03/10/2020  Tobacco  Former smoker, quit more than 30 days ago, N/A, Household tobacco concerns: No. 20 Years (Age started). 30 Years (Age stopped)., 10/21/2020  Family History  Heart attack.: Father.  Hypertension.: Mother.Negative: Father.  Health Maintenance  Health Maintenance  ???Pending?(in the next year)  ??? ??OverDue  ??? ? ? ?Influenza Vaccine due??10/01/19??and every 1??day(s)  ??? ??Due?  ??? ? ? ?ADL Screening due??10/21/20??and every 1??year(s)  ??? ? ? ?Aspirin Therapy for CVD Prevention due??10/21/20??and every 1??year(s)  ??? ? ? ?Breast Cancer Screening due??10/21/20??and every?  ??? ? ? ?Hypertension Management-Education due??10/21/20??and every 1??year(s)  ??? ? ? ?Tetanus Vaccine due??10/21/20??and every 10??year(s)  ??? ? ? ?Zoster Vaccine due??10/21/20??and every?  ??? ??Due In Future?  ??? ? ? ?Obesity Screening not due until??01/01/21??and every 1??year(s)  ??? ? ? ?Alcohol Misuse Screening not due until??01/02/21??and every 1??year(s)  ??? ? ? ?Hypertension Management-BMP not due until??03/12/21??and every 1??year(s)  ??? ? ? ?Blood Pressure Screening not due until??10/20/21??and every 1??year(s)  ??? ? ? ?Body Mass Index Check not due until??10/20/21??and every 1??year(s)  ??? ? ? ?Depression Screening not due until??10/20/21??and every 1??year(s)  ??? ? ? ?Hypertension Management-Blood Pressure not due until??10/20/21??and every 1??year(s)  ???Satisfied?(in the past 1 year)  ??? ??Satisfied?  ??? ? ? ?Alcohol Misuse Screening on??03/10/20.??Satisfied by Enma Metcalf LPN  ??? ? ? ?Blood Pressure Screening on??10/21/20.??Satisfied by Romy Schneider  ??? ? ? ?Body Mass Index  Check on??10/21/20.??Satisfied by Romy Schneider  ??? ? ? ?Depression Screening on??10/20/20.??Satisfied by Enma Metcalf LPN  ??? ? ? ?Diabetes Screening on??03/12/20.??Satisfied by Barbi Travis  ??? ? ? ?Hypertension Management-Blood Pressure on??10/21/20.??Satisfied by Romy Schneider  ??? ? ? ?Influenza Vaccine on??03/10/20.??Satisfied by Enma Metcalf LPN  ??? ? ? ?Lipid Screening on??03/12/20.??Satisfied by Barbi Travis  ??? ? ? ?Obesity Screening on??10/21/20.??Satisfied by Romy Schneider  ?  Diagnostic Results  4 views of the left and the right knee.? All within normal limits.

## 2022-09-02 ENCOUNTER — OFFICE VISIT (OUTPATIENT)
Dept: INTERNAL MEDICINE | Facility: CLINIC | Age: 62
End: 2022-09-02
Payer: MEDICARE

## 2022-09-02 VITALS
WEIGHT: 142.19 LBS | HEART RATE: 84 BPM | OXYGEN SATURATION: 99 % | BODY MASS INDEX: 27.92 KG/M2 | HEIGHT: 60 IN | DIASTOLIC BLOOD PRESSURE: 78 MMHG | RESPIRATION RATE: 20 BRPM | SYSTOLIC BLOOD PRESSURE: 126 MMHG

## 2022-09-02 DIAGNOSIS — N39.0 URINARY TRACT INFECTION WITHOUT HEMATURIA, SITE UNSPECIFIED: ICD-10-CM

## 2022-09-02 DIAGNOSIS — M19.90 OSTEOARTHRITIS, UNSPECIFIED OSTEOARTHRITIS TYPE, UNSPECIFIED SITE: ICD-10-CM

## 2022-09-02 DIAGNOSIS — R76.8 POSITIVE ANTINUCLEAR ANTIBODY: ICD-10-CM

## 2022-09-02 DIAGNOSIS — I10 BENIGN ESSENTIAL HYPERTENSION: Primary | ICD-10-CM

## 2022-09-02 DIAGNOSIS — R10.9 ABDOMINAL PAIN, UNSPECIFIED ABDOMINAL LOCATION: ICD-10-CM

## 2022-09-02 DIAGNOSIS — E78.5 HYPERLIPIDEMIA, UNSPECIFIED HYPERLIPIDEMIA TYPE: ICD-10-CM

## 2022-09-02 PROBLEM — M79.7 FIBROMYOSITIS: Status: ACTIVE | Noted: 2022-09-02

## 2022-09-02 PROBLEM — M50.30 DEGENERATION OF INTERVERTEBRAL DISC OF CERVICAL REGION: Status: ACTIVE | Noted: 2022-09-02

## 2022-09-02 PROBLEM — K62.5 RECTAL HEMORRHAGE: Status: ACTIVE | Noted: 2022-09-02

## 2022-09-02 PROBLEM — M41.125 ADOLESCENT IDIOPATHIC SCOLIOSIS OF THORACOLUMBAR REGION: Status: ACTIVE | Noted: 2022-09-02

## 2022-09-02 PROBLEM — I38 HEART VALVE DISEASE: Status: ACTIVE | Noted: 2022-09-02

## 2022-09-02 LAB
APPEARANCE UR: CLEAR
BACTERIA #/AREA URNS AUTO: NORMAL /HPF
BILIRUB UR QL STRIP.AUTO: NEGATIVE MG/DL
COLOR UR AUTO: YELLOW
GLUCOSE UR QL STRIP.AUTO: NEGATIVE MG/DL
KETONES UR QL STRIP.AUTO: NEGATIVE MG/DL
LEUKOCYTE ESTERASE UR QL STRIP.AUTO: ABNORMAL UNIT/L
NITRITE UR QL STRIP.AUTO: NEGATIVE
PH UR STRIP.AUTO: 7 [PH]
PROT UR QL STRIP.AUTO: NEGATIVE MG/DL
RBC #/AREA URNS AUTO: NORMAL /HPF
RBC UR QL AUTO: NEGATIVE UNIT/L
SP GR UR STRIP.AUTO: 1 (ref 1–1.03)
SQUAMOUS #/AREA URNS AUTO: NORMAL /HPF
UROBILINOGEN UR STRIP-ACNC: 0.2 MG/DL
WBC #/AREA URNS AUTO: NORMAL /HPF

## 2022-09-02 PROCEDURE — 99214 PR OFFICE/OUTPT VISIT, EST, LEVL IV, 30-39 MIN: ICD-10-PCS | Mod: ,,, | Performed by: INTERNAL MEDICINE

## 2022-09-02 PROCEDURE — 99214 OFFICE O/P EST MOD 30 MIN: CPT | Mod: ,,, | Performed by: INTERNAL MEDICINE

## 2022-09-02 PROCEDURE — 81001 URINALYSIS AUTO W/SCOPE: CPT | Performed by: INTERNAL MEDICINE

## 2022-09-02 PROCEDURE — 3074F SYST BP LT 130 MM HG: CPT | Mod: CPTII,,, | Performed by: INTERNAL MEDICINE

## 2022-09-02 PROCEDURE — 3074F PR MOST RECENT SYSTOLIC BLOOD PRESSURE < 130 MM HG: ICD-10-PCS | Mod: CPTII,,, | Performed by: INTERNAL MEDICINE

## 2022-09-02 PROCEDURE — 3078F DIAST BP <80 MM HG: CPT | Mod: CPTII,,, | Performed by: INTERNAL MEDICINE

## 2022-09-02 PROCEDURE — 4010F ACE/ARB THERAPY RXD/TAKEN: CPT | Mod: CPTII,,, | Performed by: INTERNAL MEDICINE

## 2022-09-02 PROCEDURE — 3078F PR MOST RECENT DIASTOLIC BLOOD PRESSURE < 80 MM HG: ICD-10-PCS | Mod: CPTII,,, | Performed by: INTERNAL MEDICINE

## 2022-09-02 PROCEDURE — 3008F PR BODY MASS INDEX (BMI) DOCUMENTED: ICD-10-PCS | Mod: CPTII,,, | Performed by: INTERNAL MEDICINE

## 2022-09-02 PROCEDURE — 1159F PR MEDICATION LIST DOCUMENTED IN MEDICAL RECORD: ICD-10-PCS | Mod: CPTII,,, | Performed by: INTERNAL MEDICINE

## 2022-09-02 PROCEDURE — 1159F MED LIST DOCD IN RCRD: CPT | Mod: CPTII,,, | Performed by: INTERNAL MEDICINE

## 2022-09-02 PROCEDURE — 4010F PR ACE/ARB THEARPY RXD/TAKEN: ICD-10-PCS | Mod: CPTII,,, | Performed by: INTERNAL MEDICINE

## 2022-09-02 PROCEDURE — 3008F BODY MASS INDEX DOCD: CPT | Mod: CPTII,,, | Performed by: INTERNAL MEDICINE

## 2022-09-02 RX ORDER — MONTELUKAST SODIUM 10 MG/1
TABLET ORAL
COMMUNITY
Start: 2022-04-25 | End: 2022-12-27

## 2022-09-02 RX ORDER — VALACYCLOVIR HYDROCHLORIDE 1 G/1
TABLET, FILM COATED ORAL
COMMUNITY
Start: 2022-06-02 | End: 2022-12-27

## 2022-09-02 RX ORDER — OLMESARTAN MEDOXOMIL 20 MG/1
20 TABLET ORAL DAILY
COMMUNITY
Start: 2022-07-25 | End: 2022-12-27

## 2022-09-02 RX ORDER — NITROFURANTOIN 25; 75 MG/1; MG/1
100 CAPSULE ORAL DAILY
COMMUNITY
Start: 2022-07-11 | End: 2022-12-27

## 2022-09-02 RX ORDER — METRONIDAZOLE 500 MG/1
TABLET ORAL 2 TIMES DAILY
COMMUNITY
Start: 2022-08-17 | End: 2022-10-24

## 2022-09-02 RX ORDER — FLUTICASONE PROPIONATE 50 MCG
SPRAY, SUSPENSION (ML) NASAL
COMMUNITY
Start: 2022-04-05 | End: 2022-12-27

## 2022-09-02 RX ORDER — LUBIPROSTONE 24 UG/1
24 CAPSULE ORAL 2 TIMES DAILY
COMMUNITY
Start: 2022-08-29 | End: 2022-10-24

## 2022-09-02 RX ORDER — PEPPERMINT OIL 90 MG
2 CAPSULE, DELAYED, AND EXTENDED RELEASE ORAL 3 TIMES DAILY
COMMUNITY
Start: 2022-08-29 | End: 2022-12-27

## 2022-09-02 RX ORDER — PHENAZOPYRIDINE HYDROCHLORIDE 200 MG/1
TABLET, FILM COATED ORAL
COMMUNITY
Start: 2022-05-31 | End: 2022-12-27

## 2022-09-02 RX ORDER — LINACLOTIDE 72 UG/1
72 CAPSULE, GELATIN COATED ORAL EVERY MORNING
COMMUNITY
Start: 2022-04-27 | End: 2022-10-24

## 2022-09-02 RX ORDER — PRAVASTATIN SODIUM 80 MG/1
TABLET ORAL
COMMUNITY
Start: 2022-03-17 | End: 2022-10-24

## 2022-09-02 RX ORDER — PANTOPRAZOLE SODIUM 40 MG/1
40 TABLET, DELAYED RELEASE ORAL 2 TIMES DAILY
COMMUNITY
Start: 2022-08-23 | End: 2022-09-19

## 2022-09-02 NOTE — PROGRESS NOTES
Subjective:       Patient ID: Eusebia Miramontes is a 61 y.o. female.    Chief Complaint: Cystitis and Abdominal Pain      The patient is a 61-year-old woman with numerous medical problems who comes here for reassessment.  CC numerous physicians over the past year.  I have not seen her in a little over a year.  She has numerous complaints including chronic abdominal pain and chronic urinary burning with urinary frequency.  She thinks she has chronic infections of the bladder.  She does see a urologist routinely.  She also has a history of kidney stones treated by Dr. Ortiz.  She was also treated for an apparent diverticulitis 2-3 weeks ago by the PA for her gastroenterologist.  She took a course of Cipro and Flagyl.  She thinks her last dose was 2 days ago although she was not consistent with the dosing because it seemed to cause abdominal pain.      She has diffuse arthritis type symptoms.  This has been treated by her rheumatologist Dr. arpita kwok.  In fact he stop her statin therapy recently because of her arm pain.  It does not sound like it helped.    In the meantime she complains of increasing weight.  Generalized fatigue.    Abdominal Pain    Review of Systems   Gastrointestinal:  Positive for abdominal pain.      Current Outpatient Medications on File Prior to Visit   Medication Sig Dispense Refill    DULoxetine (CYMBALTA) 30 MG capsule TAKE 1 CAPSULE BY MOUTH DAILY AT BEDTIME (DO NOT CRUSH OR CHEW) 30 capsule 4    DULoxetine (CYMBALTA) 60 MG capsule TAKE 1 CAPSULE BY MOUTH ONCE DAILY 30 capsule 4    fluticasone propionate (FLONASE) 50 mcg/actuation nasal spray       IBGARD 90 mg CECX Take 2 capsules by mouth 3 (three) times daily.      lubiprostone (AMITIZA) 24 MCG Cap Take 24 mcg by mouth 2 (two) times daily.      metroNIDAZOLE (FLAGYL) 500 MG tablet Take by mouth 2 (two) times daily.      montelukast (SINGULAIR) 10 mg tablet       nitrofurantoin, macrocrystal-monohydrate, (MACROBID) 100 MG capsule Take 100 mg by  mouth once daily.      olmesartan (BENICAR) 20 MG tablet Take 20 mg by mouth once daily.      pantoprazole (PROTONIX) 40 MG tablet Take 40 mg by mouth 2 (two) times daily.      phenazopyridine (PYRIDIUM) 200 MG tablet       valACYclovir (VALTREX) 1000 MG tablet       LINZESS 72 mcg Cap capsule Take 72 mcg by mouth every morning.      pravastatin (PRAVACHOL) 80 MG tablet        No current facility-administered medications on file prior to visit.     Objective:      /78 (BP Location: Right arm, Patient Position: Sitting, BP Method: Large (Manual))   Pulse 84   Resp 20   Ht 5' (1.524 m)   Wt 64.5 kg (142 lb 3.2 oz)   SpO2 99%   BMI 27.77 kg/m²     Physical Exam  Vitals reviewed.   Constitutional:       Appearance: Normal appearance.   HENT:      Head: Normocephalic.      Nose: Nose normal.      Mouth/Throat:      Pharynx: Oropharynx is clear.   Eyes:      Pupils: Pupils are equal, round, and reactive to light.   Neck:      Thyroid: No thyromegaly.   Cardiovascular:      Rate and Rhythm: Normal rate and regular rhythm.      Pulses: Normal pulses.   Abdominal:      General: Abdomen is flat. Bowel sounds are normal.      Palpations: Abdomen is soft. There is no hepatomegaly, splenomegaly or mass.      Tenderness: There is no abdominal tenderness.      Comments: There is mild diffuse tenderness to superficial palpation throughout the abdomen.  No bruits.  Normal bowel sounds.   Musculoskeletal:      Cervical back: Neck supple.   Lymphadenopathy:      Cervical: No cervical adenopathy.   Skin:     General: Skin is warm and dry.   Neurological:      Mental Status: She is alert.       Assessment:       1. Benign essential hypertension    2. Hyperlipidemia, unspecified hyperlipidemia type    3. Positive antinuclear antibody    4. Abdominal pain, unspecified abdominal location    5. Osteoarthritis, unspecified osteoarthritis type, unspecified site    6. Urinary tract infection without hematuria, site unspecified         1. History of recurrent UTIs.  I suspect she has interstitial cystitis.  Rule out infection    2. Abdominal pain. history of diverticulosis and perhaps recent diverticulitis.  She believes she has gallbladder problems but nothing showed on CT or ultrasound last year.    3. History of hyperparathyroidism.  Surgically corrected    4. Unspecified connective tissue disease.  Chronically positive ernestina followed by Dr. arpita kwok but did meet criteria for lupus.      5. History of valvular heart disease followed by Dr. gary canela 0    6. Degenerative disc disease of the spine.        Plan:       Will check a UA today.  We will send her for fasting blood work next week namely CBC CMP lipid TSH lipase.  Will schedule ultrasound abdomen.  May also need to proceed to a HIDA scan.

## 2022-09-06 ENCOUNTER — TELEPHONE (OUTPATIENT)
Dept: INTERNAL MEDICINE | Facility: CLINIC | Age: 62
End: 2022-09-06
Payer: COMMERCIAL

## 2022-09-06 DIAGNOSIS — R14.0 BLOATING: ICD-10-CM

## 2022-09-06 DIAGNOSIS — K21.9 GASTROESOPHAGEAL REFLUX DISEASE: ICD-10-CM

## 2022-09-06 DIAGNOSIS — R14.2 BELCHING: ICD-10-CM

## 2022-09-06 DIAGNOSIS — R10.84 ABDOMINAL PAIN, GENERALIZED: ICD-10-CM

## 2022-09-06 DIAGNOSIS — K59.00 CN (CONSTIPATION): Primary | ICD-10-CM

## 2022-09-06 DIAGNOSIS — K44.9 DIAPHRAGMATIC HERNIA WITHOUT OBSTRUCTION OR GANGRENE: ICD-10-CM

## 2022-09-13 DIAGNOSIS — I10 BENIGN ESSENTIAL HYPERTENSION: Primary | ICD-10-CM

## 2022-09-13 DIAGNOSIS — M79.7 FIBROMYOSITIS: ICD-10-CM

## 2022-09-13 DIAGNOSIS — R76.8 POSITIVE ANTINUCLEAR ANTIBODY: ICD-10-CM

## 2022-09-13 DIAGNOSIS — E78.5 HYPERLIPIDEMIA, UNSPECIFIED HYPERLIPIDEMIA TYPE: ICD-10-CM

## 2022-09-14 ENCOUNTER — DOCUMENTATION ONLY (OUTPATIENT)
Dept: INTERNAL MEDICINE | Facility: CLINIC | Age: 62
End: 2022-09-14
Payer: COMMERCIAL

## 2022-09-15 DIAGNOSIS — R10.9 ABDOMINAL PAIN, UNSPECIFIED ABDOMINAL LOCATION: Primary | ICD-10-CM

## 2022-09-16 ENCOUNTER — TELEPHONE (OUTPATIENT)
Dept: INTERNAL MEDICINE | Facility: CLINIC | Age: 62
End: 2022-09-16
Payer: COMMERCIAL

## 2022-09-19 NOTE — TELEPHONE ENCOUNTER
Just an FYI. Pt stated her rheumatologist took her off the cholesterol medication because her pain was too severe, pt stated she refuses to get back on it.

## 2022-10-14 ENCOUNTER — DOCUMENTATION ONLY (OUTPATIENT)
Dept: ADMINISTRATIVE | Facility: HOSPITAL | Age: 62
End: 2022-10-14
Payer: COMMERCIAL

## 2022-10-20 DIAGNOSIS — F41.9 ANXIETY: Primary | ICD-10-CM

## 2022-10-20 RX ORDER — DULOXETIN HYDROCHLORIDE 30 MG/1
CAPSULE, DELAYED RELEASE ORAL
Qty: 30 CAPSULE | Refills: 3 | Status: SHIPPED | OUTPATIENT
Start: 2022-10-20 | End: 2022-12-27

## 2022-10-24 ENCOUNTER — TELEPHONE (OUTPATIENT)
Dept: INTERNAL MEDICINE | Facility: CLINIC | Age: 62
End: 2022-10-24

## 2022-10-24 ENCOUNTER — OFFICE VISIT (OUTPATIENT)
Dept: INTERNAL MEDICINE | Facility: CLINIC | Age: 62
End: 2022-10-24
Payer: MEDICARE

## 2022-10-24 VITALS
WEIGHT: 140.38 LBS | HEART RATE: 76 BPM | BODY MASS INDEX: 27.56 KG/M2 | RESPIRATION RATE: 18 BRPM | HEIGHT: 60 IN | OXYGEN SATURATION: 99 % | SYSTOLIC BLOOD PRESSURE: 120 MMHG | DIASTOLIC BLOOD PRESSURE: 72 MMHG

## 2022-10-24 DIAGNOSIS — Z00.00 WELLNESS EXAMINATION: Primary | ICD-10-CM

## 2022-10-24 DIAGNOSIS — I38 HEART VALVE DISEASE: ICD-10-CM

## 2022-10-24 DIAGNOSIS — R76.8 POSITIVE ANTINUCLEAR ANTIBODY: ICD-10-CM

## 2022-10-24 DIAGNOSIS — Z78.9 STATIN INTOLERANCE: ICD-10-CM

## 2022-10-24 DIAGNOSIS — M19.90 OSTEOARTHRITIS, UNSPECIFIED OSTEOARTHRITIS TYPE, UNSPECIFIED SITE: ICD-10-CM

## 2022-10-24 DIAGNOSIS — R10.9 ABDOMINAL PAIN, UNSPECIFIED ABDOMINAL LOCATION: ICD-10-CM

## 2022-10-24 DIAGNOSIS — I10 BENIGN ESSENTIAL HYPERTENSION: ICD-10-CM

## 2022-10-24 DIAGNOSIS — E78.5 HYPERLIPIDEMIA, UNSPECIFIED HYPERLIPIDEMIA TYPE: ICD-10-CM

## 2022-10-24 PROCEDURE — 3078F DIAST BP <80 MM HG: CPT | Mod: CPTII,,, | Performed by: INTERNAL MEDICINE

## 2022-10-24 PROCEDURE — 3074F PR MOST RECENT SYSTOLIC BLOOD PRESSURE < 130 MM HG: ICD-10-PCS | Mod: CPTII,,, | Performed by: INTERNAL MEDICINE

## 2022-10-24 PROCEDURE — 99396 PREV VISIT EST AGE 40-64: CPT | Mod: ,,, | Performed by: INTERNAL MEDICINE

## 2022-10-24 PROCEDURE — 1159F MED LIST DOCD IN RCRD: CPT | Mod: CPTII,,, | Performed by: INTERNAL MEDICINE

## 2022-10-24 PROCEDURE — 3074F SYST BP LT 130 MM HG: CPT | Mod: CPTII,,, | Performed by: INTERNAL MEDICINE

## 2022-10-24 PROCEDURE — 99396 PR PREVENTIVE VISIT,EST,40-64: ICD-10-PCS | Mod: ,,, | Performed by: INTERNAL MEDICINE

## 2022-10-24 PROCEDURE — 3078F PR MOST RECENT DIASTOLIC BLOOD PRESSURE < 80 MM HG: ICD-10-PCS | Mod: CPTII,,, | Performed by: INTERNAL MEDICINE

## 2022-10-24 PROCEDURE — 4010F ACE/ARB THERAPY RXD/TAKEN: CPT | Mod: CPTII,,, | Performed by: INTERNAL MEDICINE

## 2022-10-24 PROCEDURE — 1159F PR MEDICATION LIST DOCUMENTED IN MEDICAL RECORD: ICD-10-PCS | Mod: CPTII,,, | Performed by: INTERNAL MEDICINE

## 2022-10-24 PROCEDURE — 4010F PR ACE/ARB THEARPY RXD/TAKEN: ICD-10-PCS | Mod: CPTII,,, | Performed by: INTERNAL MEDICINE

## 2022-10-24 RX ORDER — ALUMINUM ZIRCONIUM OCTACHLOROHYDREX GLY 16 G/100G
1 GEL TOPICAL DAILY
Qty: 30 CAPSULE | Refills: 11
Start: 2022-10-24 | End: 2022-12-27

## 2022-10-24 NOTE — PROGRESS NOTES
Subjective:       Patient ID: Eusebia Miramontes is a 62 y.o. female.    Chief Complaint: Annual Exam      The patient is a 62-year-old woman in for wellness check.  I saw her about a month ago when she was having abdominal pain.  Exacerbation of a chronic problem.  She may have had a bout of diverticulitis that was treated.  She has improved.  Workup only revealed evidence of constipation.  She also has a history of stones of the kidneys but these appear to be stable.  This also is followed by Urology.    She is taking a probiotic and off all her meds for IBS.  She does better with this.      She did not tolerate the statin for the high cholesterol.  It caused lots of bone pain and she will not take a statin drug.      She tells me she has a history of valvular heart disease with thinks there may be something else.  This is per her cardiologist and we do not have his records.  If he does have coronary disease and she would be a good candidate for PSK9 inhibitor therapy.    Review of Systems     Current Outpatient Medications on File Prior to Visit   Medication Sig Dispense Refill    DULoxetine (CYMBALTA) 30 MG capsule TAKE 1 CAPSULE BY MOUTH DAILY AT BEDTIME (DO NOT CRUSH OR CHEW) 30 capsule 3    DULoxetine (CYMBALTA) 60 MG capsule TAKE 1 CAPSULE BY MOUTH ONCE DAILY 30 capsule 4    fluticasone propionate (FLONASE) 50 mcg/actuation nasal spray       naltrexone capsule Take 4.5 mg by mouth every evening.      olmesartan (BENICAR) 20 MG tablet Take 20 mg by mouth once daily.      pantoprazole (PROTONIX) 40 MG tablet TAKE ONE TABLET BY MOUTH TWICE DAILY 60 tablet 5    IBGARD 90 mg CECX Take 2 capsules by mouth 3 (three) times daily.      montelukast (SINGULAIR) 10 mg tablet       nitrofurantoin, macrocrystal-monohydrate, (MACROBID) 100 MG capsule Take 100 mg by mouth once daily.      phenazopyridine (PYRIDIUM) 200 MG tablet       valACYclovir (VALTREX) 1000 MG tablet       [DISCONTINUED] LINZESS 72 mcg Cap capsule Take 72 mcg  by mouth every morning.      [DISCONTINUED] lubiprostone (AMITIZA) 24 MCG Cap Take 24 mcg by mouth 2 (two) times daily.      [DISCONTINUED] metroNIDAZOLE (FLAGYL) 500 MG tablet Take by mouth 2 (two) times daily.      [DISCONTINUED] pravastatin (PRAVACHOL) 80 MG tablet        No current facility-administered medications on file prior to visit.     Objective:      /72 (BP Location: Right arm, Patient Position: Sitting, BP Method: Large (Manual))   Pulse 76   Resp 18   Ht 5' (1.524 m)   Wt 63.7 kg (140 lb 6.4 oz)   SpO2 99%   BMI 27.42 kg/m²     Physical Exam  Vitals reviewed.   Constitutional:       Appearance: Normal appearance.   HENT:      Head: Normocephalic.      Nose: Nose normal.      Mouth/Throat:      Pharynx: Oropharynx is clear.   Eyes:      Pupils: Pupils are equal, round, and reactive to light.   Neck:      Thyroid: No thyromegaly.   Cardiovascular:      Rate and Rhythm: Normal rate and regular rhythm.      Pulses: Normal pulses.   Abdominal:      General: Abdomen is flat. Bowel sounds are normal.      Palpations: Abdomen is soft. There is no hepatomegaly, splenomegaly or mass.      Tenderness: There is no abdominal tenderness.      Comments: Very minimal tenderness to palpation of the abdomen in a generalized distribution   Musculoskeletal:      Cervical back: Neck supple.   Lymphadenopathy:      Cervical: No cervical adenopathy.   Skin:     General: Skin is warm and dry.   Neurological:      Mental Status: She is alert.     Recent lab work reviewed again.  Assessment:       1. Wellness examination    2. Abdominal pain, unspecified abdominal location    3. Benign essential hypertension    4. Hyperlipidemia, unspecified hyperlipidemia type    5. Positive antinuclear antibody    6. Osteoarthritis, unspecified osteoarthritis type, unspecified site    7. Heart valve disease    8. Statin intolerance        2. Abdominal pain.  Some degree of constipation.  May also have had a bout of diverticulitis      3.  Hypertension.  Good control    4. Hyperlipidemia.  Poor control .  She is statin intolerant     5. Possible valvular heart disease.  Concerns about possible coronary disease particularly in light of the high cholesterol.    6. Autoimmune disease followed by Dr. Dailey.      Plan:           Continue same meds.  Follow-up with me 6 months with CBC CMP lipid TSH prior     Will try to get records from her cardiologist to see if there is any coronary disease.  If present then PS K 9 inhibitor therapy would be indicated.

## 2022-10-26 ENCOUNTER — TELEPHONE (OUTPATIENT)
Dept: INTERNAL MEDICINE | Facility: CLINIC | Age: 62
End: 2022-10-26
Payer: COMMERCIAL

## 2022-10-26 NOTE — TELEPHONE ENCOUNTER
Spoke with Cielo at [facility] and pt was scheduled for nuclear scan in March and it was cancelled. Dr.Alexander gomez.

## 2022-10-26 NOTE — TELEPHONE ENCOUNTER
I saw the patient recently for follow-up.  She stated that the cardiologist me a found something else.  She was not sure what the results were.  I did get results from her cardiologist Dr. Wei.  An echocardiogram showed mild mitral regurg and normal systolic function with mildly impaired diastolic function.  Lab work was included.  A nuclear scan was ordered to screen for ischemic heart disease.  That report was not included.      Please see if that study has been done.  And if so we need those results.  She does have high cholesterol and is statin intolerant and she has documented coronary disease that she would likely be a good candidate for PS K 9 inhibitor therapy.

## 2022-10-26 NOTE — TELEPHONE ENCOUNTER
Spoke to the [facility], was advised patient declined to schedule NM test until she could speak to Dr. Molina regarding the test.

## 2022-10-31 ENCOUNTER — HOSPITAL ENCOUNTER (OUTPATIENT)
Dept: RADIOLOGY | Facility: HOSPITAL | Age: 62
Discharge: HOME | End: 2022-10-31
Attending: INTERNAL MEDICINE
Payer: MEDICARE

## 2022-10-31 DIAGNOSIS — Z00.00 WELLNESS EXAMINATION: ICD-10-CM

## 2022-10-31 PROCEDURE — 77067 SCR MAMMO BI INCL CAD: CPT | Mod: TC

## 2022-10-31 PROCEDURE — 77067 MAMMO DIGITAL SCREENING BILAT WITH TOMO: ICD-10-PCS | Mod: 26,,, | Performed by: RADIOLOGY

## 2022-10-31 PROCEDURE — 77063 MAMMO DIGITAL SCREENING BILAT WITH TOMO: ICD-10-PCS | Mod: 26,,, | Performed by: RADIOLOGY

## 2022-10-31 PROCEDURE — 77063 BREAST TOMOSYNTHESIS BI: CPT | Mod: 26,,, | Performed by: RADIOLOGY

## 2022-10-31 PROCEDURE — 77067 SCR MAMMO BI INCL CAD: CPT | Mod: 26,,, | Performed by: RADIOLOGY

## 2022-11-16 DIAGNOSIS — F32.A DEPRESSIVE DISORDER: Primary | ICD-10-CM

## 2022-11-16 RX ORDER — DULOXETIN HYDROCHLORIDE 60 MG/1
CAPSULE, DELAYED RELEASE ORAL
Qty: 30 CAPSULE | Refills: 3 | Status: SHIPPED | OUTPATIENT
Start: 2022-11-16 | End: 2022-12-27

## 2022-11-25 ENCOUNTER — OFFICE VISIT (OUTPATIENT)
Dept: URGENT CARE | Facility: CLINIC | Age: 62
End: 2022-11-25
Payer: MEDICARE

## 2022-11-25 VITALS
TEMPERATURE: 99 F | SYSTOLIC BLOOD PRESSURE: 128 MMHG | WEIGHT: 140 LBS | BODY MASS INDEX: 24.8 KG/M2 | OXYGEN SATURATION: 97 % | RESPIRATION RATE: 18 BRPM | HEIGHT: 63 IN | DIASTOLIC BLOOD PRESSURE: 92 MMHG | HEART RATE: 89 BPM

## 2022-11-25 DIAGNOSIS — K57.92 DIVERTICULITIS: ICD-10-CM

## 2022-11-25 DIAGNOSIS — U07.1 COVID-19 VIRUS DETECTED: ICD-10-CM

## 2022-11-25 DIAGNOSIS — U07.1 LAB TEST POSITIVE FOR DETECTION OF COVID-19 VIRUS: Primary | ICD-10-CM

## 2022-11-25 LAB
BILIRUB UR QL STRIP: POSITIVE
CTP QC/QA: YES
CTP QC/QA: YES
GLUCOSE UR QL STRIP: NEGATIVE
KETONES UR QL STRIP: NEGATIVE
LEUKOCYTE ESTERASE UR QL STRIP: NEGATIVE
PH, POC UA: 5
POC BLOOD, URINE: POSITIVE
POC NITRATES, URINE: NEGATIVE
PROT UR QL STRIP: NEGATIVE
S PYO RRNA THROAT QL PROBE: NEGATIVE
SARS-COV-2 RDRP RESP QL NAA+PROBE: POSITIVE
SP GR UR STRIP: 1.01 (ref 1–1.03)
UROBILINOGEN UR STRIP-ACNC: ABNORMAL (ref 0.1–1.1)

## 2022-11-25 PROCEDURE — 3008F PR BODY MASS INDEX (BMI) DOCUMENTED: ICD-10-PCS | Mod: CPTII,,, | Performed by: FAMILY MEDICINE

## 2022-11-25 PROCEDURE — 3008F BODY MASS INDEX DOCD: CPT | Mod: CPTII,,, | Performed by: FAMILY MEDICINE

## 2022-11-25 PROCEDURE — 81003 URINALYSIS AUTO W/O SCOPE: CPT | Mod: QW,,, | Performed by: FAMILY MEDICINE

## 2022-11-25 PROCEDURE — 3080F PR MOST RECENT DIASTOLIC BLOOD PRESSURE >= 90 MM HG: ICD-10-PCS | Mod: CPTII,,, | Performed by: FAMILY MEDICINE

## 2022-11-25 PROCEDURE — 81003 POCT URINALYSIS, DIPSTICK, AUTOMATED, W/O SCOPE: ICD-10-PCS | Mod: QW,,, | Performed by: FAMILY MEDICINE

## 2022-11-25 PROCEDURE — 96372 THER/PROPH/DIAG INJ SC/IM: CPT | Mod: ,,, | Performed by: FAMILY MEDICINE

## 2022-11-25 PROCEDURE — 3074F SYST BP LT 130 MM HG: CPT | Mod: CPTII,,, | Performed by: FAMILY MEDICINE

## 2022-11-25 PROCEDURE — 3074F PR MOST RECENT SYSTOLIC BLOOD PRESSURE < 130 MM HG: ICD-10-PCS | Mod: CPTII,,, | Performed by: FAMILY MEDICINE

## 2022-11-25 PROCEDURE — 99213 PR OFFICE/OUTPT VISIT, EST, LEVL III, 20-29 MIN: ICD-10-PCS | Mod: 25,CR,, | Performed by: FAMILY MEDICINE

## 2022-11-25 PROCEDURE — 4010F ACE/ARB THERAPY RXD/TAKEN: CPT | Mod: CPTII,,, | Performed by: FAMILY MEDICINE

## 2022-11-25 PROCEDURE — 87635: ICD-10-PCS | Mod: QW,CR,, | Performed by: FAMILY MEDICINE

## 2022-11-25 PROCEDURE — 3080F DIAST BP >= 90 MM HG: CPT | Mod: CPTII,,, | Performed by: FAMILY MEDICINE

## 2022-11-25 PROCEDURE — 4010F PR ACE/ARB THEARPY RXD/TAKEN: ICD-10-PCS | Mod: CPTII,,, | Performed by: FAMILY MEDICINE

## 2022-11-25 PROCEDURE — 1159F MED LIST DOCD IN RCRD: CPT | Mod: CPTII,,, | Performed by: FAMILY MEDICINE

## 2022-11-25 PROCEDURE — 87880 POCT RAPID STREP A: ICD-10-PCS | Mod: QW,,, | Performed by: FAMILY MEDICINE

## 2022-11-25 PROCEDURE — 1160F RVW MEDS BY RX/DR IN RCRD: CPT | Mod: CPTII,,, | Performed by: FAMILY MEDICINE

## 2022-11-25 PROCEDURE — 1160F PR REVIEW ALL MEDS BY PRESCRIBER/CLIN PHARMACIST DOCUMENTED: ICD-10-PCS | Mod: CPTII,,, | Performed by: FAMILY MEDICINE

## 2022-11-25 PROCEDURE — 99213 OFFICE O/P EST LOW 20 MIN: CPT | Mod: 25,CR,, | Performed by: FAMILY MEDICINE

## 2022-11-25 PROCEDURE — 1159F PR MEDICATION LIST DOCUMENTED IN MEDICAL RECORD: ICD-10-PCS | Mod: CPTII,,, | Performed by: FAMILY MEDICINE

## 2022-11-25 PROCEDURE — 96372 PR INJECTION,THERAP/PROPH/DIAG2ST, IM OR SUBCUT: ICD-10-PCS | Mod: ,,, | Performed by: FAMILY MEDICINE

## 2022-11-25 PROCEDURE — 87635 SARS-COV-2 COVID-19 AMP PRB: CPT | Mod: QW,CR,, | Performed by: FAMILY MEDICINE

## 2022-11-25 PROCEDURE — 87880 STREP A ASSAY W/OPTIC: CPT | Mod: QW,,, | Performed by: FAMILY MEDICINE

## 2022-11-25 RX ORDER — AMOXICILLIN AND CLAVULANATE POTASSIUM 875; 125 MG/1; MG/1
1 TABLET, FILM COATED ORAL EVERY 12 HOURS
Qty: 14 TABLET | Refills: 0 | Status: SHIPPED | OUTPATIENT
Start: 2022-11-25 | End: 2022-12-02

## 2022-11-25 RX ORDER — BETAMETHASONE SODIUM PHOSPHATE AND BETAMETHASONE ACETATE 3; 3 MG/ML; MG/ML
9 INJECTION, SUSPENSION INTRA-ARTICULAR; INTRALESIONAL; INTRAMUSCULAR; SOFT TISSUE
Status: COMPLETED | OUTPATIENT
Start: 2022-11-25 | End: 2022-11-25

## 2022-11-25 RX ADMIN — BETAMETHASONE SODIUM PHOSPHATE AND BETAMETHASONE ACETATE 9 MG: 3; 3 INJECTION, SUSPENSION INTRA-ARTICULAR; INTRALESIONAL; INTRAMUSCULAR; SOFT TISSUE at 02:11

## 2022-11-25 NOTE — PATIENT INSTRUCTIONS
Drink plenty of fluids.      Get plenty of rest.      Tylenol or Motrin as needed.      Go to the ER with any significant change or worsening of symptoms.     Follow up with your primary care doctor.       Please follow-up with your primary care provider to have another urine dipstick done to see whether or not the blood in your urine has resolved.

## 2022-11-25 NOTE — PROGRESS NOTES
Patient ID: [id number]     Chief Complaint: upper respiratory tract infection symptoms    History of Present Illness:     Eusebia Miramontes is a 62 y.o. female  who presents today for symptoms of Cough (Cough, knvg. earache, sore throat, body aches x yesterday. Tested positive at home for Covid this morning.) and Urinary Frequency (Urinary frequency and abdominal pressure x yesterday )      Pt denies experiencing any fevers, chills, nausea, vomiting, difficulty breathing, dysphagia, or neck stiffness.    Past Medical History:     ----------------------------  GERD (gastroesophageal reflux disease)  Hyperlipidemia  Hypertension     History reviewed. No pertinent surgical history.    Review of patient's allergies indicates:  No Known Allergies    Outpatient Medications Marked as Taking for the 11/25/22 encounter (Office Visit) with Alejandro Rodriguez MD   Medication Sig Dispense Refill    Bifidobacterium infantis (ALIGN) 4 mg Cap Take 1 capsule (4 mg total) by mouth once daily. 30 capsule 11    DULoxetine (CYMBALTA) 30 MG capsule TAKE 1 CAPSULE BY MOUTH DAILY AT BEDTIME (DO NOT CRUSH OR CHEW) 30 capsule 3    DULoxetine (CYMBALTA) 60 MG capsule TAKE 1 CAPSULE BY MOUTH ONCE DAILY 30 capsule 3    fluticasone propionate (FLONASE) 50 mcg/actuation nasal spray       IBGARD 90 mg CECX Take 2 capsules by mouth 3 (three) times daily.      montelukast (SINGULAIR) 10 mg tablet       naltrexone capsule Take 4.5 mg by mouth every evening.      nitrofurantoin, macrocrystal-monohydrate, (MACROBID) 100 MG capsule Take 100 mg by mouth once daily.      olmesartan (BENICAR) 20 MG tablet Take 20 mg by mouth once daily.      pantoprazole (PROTONIX) 40 MG tablet TAKE ONE TABLET BY MOUTH TWICE DAILY 60 tablet 5    phenazopyridine (PYRIDIUM) 200 MG tablet       valACYclovir (VALTREX) 1000 MG tablet        Current Facility-Administered Medications for the 11/25/22 encounter (Office Visit) with Alejandro Rodriguez MD   Medication Dose Route Frequency  "Provider Last Rate Last Admin    betamethasone acetate-betamethasone sodium phosphate injection 9 mg  9 mg Intramuscular 1 time in Clinic/HOD Alejandro Rodriguez MD           Social History     Socioeconomic History    Marital status:    Tobacco Use    Smoking status: Never    Smokeless tobacco: Never   Substance and Sexual Activity    Alcohol use: Not Currently    Drug use: Never        History reviewed. No pertinent family history.     Subjective:     Review of Systems   Constitutional:  Negative for chills, fever and malaise/fatigue.   HENT:  Positive for ear pain and sore throat. Negative for congestion, ear discharge and sinus pain.    Respiratory:  Positive for cough. Negative for sputum production, shortness of breath, wheezing and stridor.    Gastrointestinal:  Negative for abdominal pain, diarrhea, nausea and vomiting.   Genitourinary:  Positive for frequency. Negative for dysuria and urgency.   Musculoskeletal:  Positive for myalgias. Negative for neck pain.   Skin:  Negative for rash.   Neurological:  Negative for headaches.     Objective:     BP (!) 128/92   Pulse 89   Temp 98.8 °F (37.1 °C)   Resp 18   Ht 5' 3" (1.6 m)   Wt 63.5 kg (140 lb)   SpO2 97%   BMI 24.80 kg/m²     Physical Exam  Constitutional:       General: She is not in acute distress.     Appearance: Normal appearance. She is not ill-appearing or toxic-appearing.   HENT:      Head: Normocephalic and atraumatic.      Right Ear: Tympanic membrane and ear canal normal.      Left Ear: Tympanic membrane and ear canal normal.      Nose: No congestion or rhinorrhea.      Mouth/Throat:      Pharynx: Oropharynx is clear. No oropharyngeal exudate or posterior oropharyngeal erythema.   Eyes:      General:         Right eye: No discharge.         Left eye: No discharge.      Extraocular Movements: Extraocular movements intact.      Conjunctiva/sclera: Conjunctivae normal.   Cardiovascular:      Rate and Rhythm: Normal rate and regular rhythm. "      Heart sounds: Normal heart sounds. No murmur heard.    No gallop.   Pulmonary:      Effort: Pulmonary effort is normal. No respiratory distress.      Breath sounds: Normal breath sounds. No stridor. No wheezing, rhonchi or rales.   Chest:      Chest wall: No tenderness.   Abdominal:      General: There is no distension.      Palpations: There is no mass.      Tenderness: There is abdominal tenderness. There is no right CVA tenderness, left CVA tenderness, guarding (Left upper quadrant) or rebound.      Hernia: No hernia is present.   Musculoskeletal:      Cervical back: No rigidity or tenderness.   Lymphadenopathy:      Cervical: No cervical adenopathy.   Neurological:      Mental Status: She is alert and oriented to person, place, and time. Mental status is at baseline.   Psychiatric:         Mood and Affect: Mood normal.         Behavior: Behavior normal.       Assessment & Plan:       ICD-10-CM ICD-9-CM   1. Lab test positive for detection of COVID-19 virus  U07.1 079.89   2. Diverticulitis  K57.92 562.11        1. Lab test positive for detection of COVID-19 virus  -     POCT rapid strep A  -     POCT COVID-19 Rapid Screening  -     betamethasone acetate-betamethasone sodium phosphate injection 9 mg    2. Diverticulitis  -     POCT Urinalysis, Dipstick, Automated, W/O Scope  -     amoxicillin-clavulanate 875-125mg (AUGMENTIN) 875-125 mg per tablet; Take 1 tablet by mouth every 12 (twelve) hours. for 7 days  Dispense: 14 tablet; Refill: 0       Strep negative, Influenza negative, and Covid positive.  Patient declined Paxlovid.  She will monitor for worsening symptoms and seek emergency care if she develops shortness of breath.    She also complained of mild left upper quadrant pain that has been going on for over a year.  This has been investigated already by her primary provider, including a CT scan in September which showed only constipation.  She also reports she had diverticulosis diagnosed on her  colonoscopy 3 years ago, and had a bout of diverticulitis treated with antibiotics which actually resolved her pain not long ago.  He reports that the pain that resolved with antibiotics is the same pain  In addition to giving her antibiotics, she will follow up with her primary doctor and get with her gastroenterologist for a repeat colonoscopy if indicated.

## 2022-11-30 ENCOUNTER — NURSE TRIAGE (OUTPATIENT)
Dept: ADMINISTRATIVE | Facility: CLINIC | Age: 62
End: 2022-11-30
Payer: MEDICARE

## 2022-11-30 NOTE — TELEPHONE ENCOUNTER
Pt responded to [facility] text message. Pt. Reports that she has sinus pressure, and congestion, and HA, also reports that she has right earache. Pt advised to be seen in UC/ED, OAC also offered, and RR. Pt states she will go to  today.  Reason for Disposition   Earache    Additional Information   Negative: Sounds like a life-threatening emergency to the triager   Negative: Difficulty breathing, and not from stuffy nose (e.g., not relieved by cleaning out the nose)   Negative: SEVERE headache and has fever   Negative: Patient sounds very sick or weak to the triager   Negative: SEVERE sinus pain   Negative: Severe headache   Negative: Redness or swelling on the cheek, forehead, or around the eye   Negative: Fever > 103 F (39.4 C)   Negative: Fever > 101 F (38.3 C) and over 60 years of age   Negative: Fever > 100.0 F (37.8 C) and has diabetes mellitus or a weak immune system (e.g., HIV positive, cancer chemotherapy, organ transplant, splenectomy, chronic steroids)   Negative: Fever > 100.0 F (37.8 C) and bedridden (e.g., nursing home patient, stroke, chronic illness, recovering from surgery)   Negative: Fever present > 3 days (72 hours)   Negative: Fever returns after gone for over 24 hours and symptoms worse or not improved   Negative: Sinus pain (not just congestion) and fever    Protocols used: Sinus Pain or Congestion-A-[place]

## 2022-12-05 ENCOUNTER — NURSE TRIAGE (OUTPATIENT)
Dept: ADMINISTRATIVE | Facility: CLINIC | Age: 62
End: 2022-12-05
Payer: COMMERCIAL

## 2022-12-05 PROBLEM — K62.5 RECTAL HEMORRHAGE: Status: RESOLVED | Noted: 2022-09-02 | Resolved: 2022-12-05

## 2022-12-05 NOTE — TELEPHONE ENCOUNTER
Spoke with pt who states she developed COVID symptoms before thanksgiving. Asking what can she do to help prevent spread between her, and her daughter. Pt advised on wearing face masks, and disinfecting highly touched surfaces. Pt also advised based on CDC guideline regarding ending home isolation. Pt verbalized understanding.  Reason for Disposition   Information only question and nurse able to answer   COVID-19 Prevention and Healthy Living, questions about    Protocols used: Information Only Call - No Triage-A-[place], Coronavirus (COVID-19) Diagnosed or [id number]

## 2022-12-08 ENCOUNTER — HOSPITAL ENCOUNTER (OUTPATIENT)
Dept: RADIOLOGY | Facility: HOSPITAL | Age: 62
Discharge: HOME | End: 2022-12-08
Attending: INTERNAL MEDICINE
Payer: COMMERCIAL

## 2022-12-08 DIAGNOSIS — R92.8 ABNORMAL MAMMOGRAM: ICD-10-CM

## 2022-12-08 PROCEDURE — 77065 DX MAMMO INCL CAD UNI: CPT | Mod: 26,LT,, | Performed by: RADIOLOGY

## 2022-12-08 PROCEDURE — 76642 ULTRASOUND BREAST LIMITED: CPT | Mod: 26,LT,, | Performed by: RADIOLOGY

## 2022-12-08 PROCEDURE — 76642 ULTRASOUND BREAST LIMITED: CPT | Mod: TC,LT

## 2022-12-08 PROCEDURE — 77065 MAMMO DIGITAL DIAGNOSTIC LEFT WITH TOMO: ICD-10-PCS | Mod: 26,LT,, | Performed by: RADIOLOGY

## 2022-12-08 PROCEDURE — 77065 DX MAMMO INCL CAD UNI: CPT | Mod: TC,LT

## 2022-12-08 PROCEDURE — 76642 US BREAST LEFT LIMITED: ICD-10-PCS | Mod: 26,LT,, | Performed by: RADIOLOGY

## 2022-12-08 PROCEDURE — 77061 BREAST TOMOSYNTHESIS UNI: CPT | Mod: 26,LT,, | Performed by: RADIOLOGY

## 2022-12-08 PROCEDURE — 77061 MAMMO DIGITAL DIAGNOSTIC LEFT WITH TOMO: ICD-10-PCS | Mod: 26,LT,, | Performed by: RADIOLOGY

## 2022-12-19 ENCOUNTER — HOSPITAL ENCOUNTER (OUTPATIENT)
Dept: RADIOLOGY | Facility: HOSPITAL | Age: 62
Discharge: HOME | End: 2022-12-19
Attending: INTERNAL MEDICINE
Payer: COMMERCIAL

## 2022-12-19 DIAGNOSIS — R92.8 ABNORMAL MAMMOGRAM: Primary | ICD-10-CM

## 2022-12-19 DIAGNOSIS — R92.8 ABNORMAL MAMMOGRAM: ICD-10-CM

## 2022-12-19 PROCEDURE — 27000549 US BREAST BIOPSY WITH IMAGING 1ST SITE LEFT

## 2022-12-19 PROCEDURE — 77065 DX MAMMO INCL CAD UNI: CPT | Mod: 26,LT,, | Performed by: RADIOLOGY

## 2022-12-19 PROCEDURE — 19083 BX BREAST 1ST LESION US IMAG: CPT | Mod: LT

## 2022-12-19 PROCEDURE — 77061 MAMMO DIGITAL DIAGNOSTIC LEFT WITH TOMO: ICD-10-PCS | Mod: 26,LT,, | Performed by: RADIOLOGY

## 2022-12-19 PROCEDURE — 77061 BREAST TOMOSYNTHESIS UNI: CPT | Mod: 26,LT,, | Performed by: RADIOLOGY

## 2022-12-19 PROCEDURE — 19083 BX BREAST 1ST LESION US IMAG: CPT | Mod: LT,,, | Performed by: RADIOLOGY

## 2022-12-19 PROCEDURE — 77065 DX MAMMO INCL CAD UNI: CPT | Mod: TC,LT

## 2022-12-19 PROCEDURE — 77065 MAMMO DIGITAL DIAGNOSTIC LEFT WITH TOMO: ICD-10-PCS | Mod: 26,LT,, | Performed by: RADIOLOGY

## 2022-12-19 PROCEDURE — 19083 US BREAST BIOPSY WITH IMAGING 1ST SITE LEFT: ICD-10-PCS | Mod: LT,,, | Performed by: RADIOLOGY

## 2022-12-20 DIAGNOSIS — N64.89 COMPLEX SCLEROSING LESION OF LEFT BREAST: Primary | ICD-10-CM

## 2022-12-20 LAB
ESTROGEN SERPL-MCNC: NORMAL PG/ML
INSULIN SERPL-ACNC: NORMAL U[IU]/ML
LAB AP CLINICAL INFORMATION: NORMAL
LAB AP GROSS DESCRIPTION: NORMAL
LAB AP REPORT FOOTNOTES: NORMAL
T3RU NFR SERPL: NORMAL %

## 2022-12-27 ENCOUNTER — OFFICE VISIT (OUTPATIENT)
Dept: SURGERY | Facility: CLINIC | Age: 62
End: 2022-12-27
Payer: COMMERCIAL

## 2022-12-27 VITALS
HEART RATE: 83 BPM | RESPIRATION RATE: 20 BRPM | SYSTOLIC BLOOD PRESSURE: 123 MMHG | TEMPERATURE: 98 F | BODY MASS INDEX: 24.48 KG/M2 | WEIGHT: 138.19 LBS | OXYGEN SATURATION: 100 % | DIASTOLIC BLOOD PRESSURE: 88 MMHG | HEIGHT: 63 IN

## 2022-12-27 DIAGNOSIS — N64.89 COMPLEX SCLEROSING LESION OF LEFT BREAST: Primary | ICD-10-CM

## 2022-12-27 DIAGNOSIS — Z01.818 PRE-OP TESTING: ICD-10-CM

## 2022-12-27 DIAGNOSIS — F32.A DEPRESSION, UNSPECIFIED DEPRESSION TYPE: ICD-10-CM

## 2022-12-27 PROBLEM — Z87.442 HISTORY OF KIDNEY STONES: Status: ACTIVE | Noted: 2022-12-27

## 2022-12-27 PROCEDURE — 99205 PR OFFICE/OUTPT VISIT, NEW, LEVL V, 60-74 MIN: ICD-10-PCS | Mod: ,,, | Performed by: PHYSICIAN ASSISTANT

## 2022-12-27 PROCEDURE — 99215 OFFICE O/P EST HI 40 MIN: CPT | Mod: PBBFAC | Performed by: PHYSICIAN ASSISTANT

## 2022-12-27 PROCEDURE — 99205 OFFICE O/P NEW HI 60 MIN: CPT | Mod: ,,, | Performed by: PHYSICIAN ASSISTANT

## 2022-12-27 RX ORDER — PNV NO.95/FERROUS FUM/FOLIC AC 28MG-0.8MG
100 TABLET ORAL DAILY
COMMUNITY

## 2022-12-27 RX ORDER — LORATADINE 10 MG/1
10 TABLET ORAL DAILY
COMMUNITY

## 2022-12-27 RX ORDER — IBUPROFEN 100 MG/5ML
1000 SUSPENSION, ORAL (FINAL DOSE FORM) ORAL DAILY
COMMUNITY
End: 2023-02-28

## 2022-12-27 RX ORDER — CHOLECALCIFEROL (VITAMIN D3) 25 MCG
1000 TABLET ORAL DAILY
COMMUNITY

## 2022-12-27 RX ORDER — METAXALONE 800 MG/1
800 TABLET ORAL DAILY
COMMUNITY
Start: 2022-11-16 | End: 2023-03-29

## 2022-12-27 RX ORDER — LEVOCETIRIZINE DIHYDROCHLORIDE 5 MG/1
5 TABLET, FILM COATED ORAL NIGHTLY
COMMUNITY

## 2022-12-27 NOTE — H&P (VIEW-ONLY)
[facility] Breast Surg  Breast Surgical Oncology  New Patient Office Visit - H&P      Referring Provider: Dr. Leobardo Molina  PCP: Leobardo Molina MD     Chief Complaint:   Chief Complaint   Patient presents with    Breast Pain     Right breast discomfort, left breast biopsy done on 22, no nipple discharge,       Subjective:     HPI:  Eusebia Miramontes is a 62 y.o. female who presents on 2022 for evaluation of  left breast complex sclerosing lesions .    She currently denies any breast issues including rashes, redness, pain, swelling, nipple discharge, or new lumps/masses.    Imagin2022 SCR MG at [facility] - There is a 0.5 cm oval mass with an indistinct margin in the left breast at 6:00 posterior depth. BI-RADS 0: Incomplete: Left diagnostic mammography with tomosynthesis (to include spot compression views), followed by targeted left breast ultrasound if needed.    2022 L DG MG at [facility] - In the 5:00 left breast posterior depth, there is a 0.6 cm oval mass with circumscribed margins, which corresponds to the mass seen in the 6:00 left breast posterior depth on recent screening mammogram. Targeted US in the 5:00 left breast 6 cm from the nipple reveals two adjacent parallel oval avascular heterogeneous masses with circumscribed margins, which are located approximately 1 cm apart. The larger of the two masses, measuring 0.6 cm x 0.3 cm x 0.5 cm, correlates with the oval mass in the 5:00 left breast posterior depth on mammogram.  The second mass measures 0.5 cm x 0.2 cm x 0.4 cm. BIRADS 4: Left breast 5:00 6 cm from the nipple two adjacent oval circumscribed heterogeneous masses are suspicious for malignancy. Ultrasound guided core needle biopsy of the larger of the two masses in the 5:00 left breast 6 cm from the nipple is recommended.    Pathology:   2022 - Left Breast 5:00 6 cmfn - Complex sclerosing lesion with moderate duct epithelial hyperplasia,  usual type.    Please note that radiology recommends that this pathology is also applied to the additional oval, heterogeneous mass with circumscribed margins in the 5:00 left breast, 6 cm from the nipple.    OB/GYN History:  Age at Menarche Onset: 15  Menopausal Status: postmenopausal, at age 48  Hysterectomy/Oophorectomy: no  Hormonal birth control (duration): 20 years  Pregnancy History:   Age at first live birth: 23  Hormone Replacement Therapy: Yes,  reports progesterone (but likely combination HRT), stopped 5-7 years ago    Other:  # of breast biopsies (when and pathology results): 2022 - [facility]  MG breast density: Category B  Prior thoracic RT: none  Genetic testing: none  Ashkenazi Amish descent: No    Family History:  Family History   Problem Relation Age of Onset    Dementia Mother     Stomach cancer Maternal Aunt         diagnosed in her late 70s    Leukemia Paternal Uncle         Patient History:  Past Medical History:   Diagnosis Date    GERD (gastroesophageal reflux disease)     Hyperlipidemia     Hypertension        Past Surgical History:   Procedure Laterality Date    ADENOIDECTOMY      BILATERAL TUBAL LIGATION      PARATHYROIDECTOMY      TONSILLECTOMY         Social History     Socioeconomic History    Marital status:    Tobacco Use    Smoking status: Former     Types: Cigarettes    Smokeless tobacco: Never    Tobacco comments:     Quit smoking in    Substance and Sexual Activity    Alcohol use: Not Currently    Drug use: Yes     Types: Marijuana    Sexual activity: Not Currently       Immunization History   Administered Date(s) Administered    COVID-19, MRNA, LN-S, PF (Pfizer) (Purple Cap) 2021, 2021       Medications/Allergies:    Current Outpatient Medications:     ascorbic acid, vitamin C, (VITAMIN C) 1000 MG tablet, Take 1,000 mg by mouth once daily., Disp: , Rfl:     cyanocobalamin (VITAMIN B-12) 100 MCG tablet, Take 100 mcg by mouth once daily., Disp: , Rfl:      levocetirizine (XYZAL) 5 MG tablet, Take 5 mg by mouth every evening., Disp: , Rfl:     loratadine (CLARITIN) 10 mg tablet, Take 10 mg by mouth once daily., Disp: , Rfl:     metaxalone (SKELAXIN) 800 MG tablet, Take 800 mg by mouth 3 (three) times daily., Disp: , Rfl:     vitamin D (VITAMIN D3) 1000 units Tab, Take 1,000 Units by mouth once daily., Disp: , Rfl:      Review of patient's allergies indicates:  No Known Allergies    Review of Systems:  Review of Systems   Constitutional:  Positive for malaise/fatigue. Negative for chills, fever and weight loss.        Reports fatigue related to mood   HENT:  Negative for congestion and sore throat.    Eyes:  Negative for blurred vision and double vision.   Respiratory:  Negative for cough, hemoptysis, shortness of breath and wheezing.    Cardiovascular:  Negative for chest pain, palpitations and leg swelling.   Gastrointestinal:  Negative for abdominal pain, constipation, diarrhea, heartburn, nausea and vomiting.   Genitourinary:  Negative for dysuria, frequency and hematuria.   Musculoskeletal:  Positive for back pain. Negative for falls, joint pain and myalgias.        Chronic   Skin:  Negative for itching and rash.   Neurological:  Negative for dizziness, sensory change, focal weakness, seizures and headaches.   Endo/Heme/Allergies:  Negative for environmental allergies. Does not bruise/bleed easily.   Psychiatric/Behavioral:  Positive for depression. Negative for suicidal ideas. The patient is nervous/anxious.      Objective:     Vitals:  Vitals:    12/27/22 1333   BP: 123/88   Pulse: 83   Resp: 20   Temp: 98 °F (36.7 °C)     Body mass index is 24.48 kg/m².     Physical Exam:  General: The patient is awake, alert and oriented times three. The patient is well nourished and in no acute distress.  Neck: There is no evidence of palpable cervical, supraclavicular or axillary adenopathy. The neck is supple. The thyroid is not enlarged.  Musculoskeletal: The patient has a  normal range of motion of her bilateral upper extremities.  Chest: Examination of the chest wall fails to reveal any obvious abnormalities.  The lungs are clear to auscultation bilaterally without rales, rhonchi, or wheezing.  Cardiovascular: The heart has a regular rate and rhythm without murmurs, gallops or rubs.  Breast:   Right:  Examination of right breast fails to reveal any dominant masses or areas of significant focal nodularity. The nipple is everted without evidence of discharge. There is no skin dimpling with movement of the pectoralis. There is no significant skin changes overlying the breast.   Left:  Examination of the left breast fails to reveal any dominant masses or areas of significant focal nodularity. The nipple is everted without evidence of discharge. There is no skin dimpling with movement of the pectoralis. There are no significant skin changes overlying the breast.  Abdomen: The abdomen is soft, flat, nontender and nondistended with no palpable masses or organomegaly.  Integumentary: no rashes or skin lesions present  Neurologic: cranial nerves intact, no signs of peripheral neurological deficit, motor/sensory function intact    Assessment and Plan:     Patient Active Problem List   Diagnosis    Benign essential hypertension    Fibromyositis    Degeneration of intervertebral disc of cervical region    Hyperlipidemia    Heart valve disease    Osteoarthritis    Positive antinuclear antibody    Adolescent idiopathic scoliosis of thoracolumbar region        Eusebia was seen today for breast pain.    Diagnoses and all orders for this visit:    Complex sclerosing lesion of left breast  -     Ambulatory referral/consult to Breast Surgery          Radial scars, also called complex sclerosing lesions, are a pathologic diagnosis, usually discovered incidentally when a breast mass or radiologic abnormality is removed or biopsied. Occasionally, radial scars are large enough to be detected on mammography as  "suspicious spiculated masses, which cannot be reliably differentiated from spiculated carcinoma by imaging alone. Radial scars are characterized microscopically by a fibroelastic core with radiating ducts and lobules.    In general, surgical excision is recommended when radial scars or complex sclerosing lesions are diagnosed on core biopsy, based on series showing that 8 to 17 percent of surgical specimens at subsequent excision are positive for malignancy. The current recommendations from the American Society of Breast Surgeons state that most radial scars "should be excised, although imaging follow-up is reasonable for small, image-detected radial scars that are completely removed or well-sampled with large-gauge devices and in the setting of imaging-pathology concordance".  No additional treatment beyond excision is needed for radial scars. The risk of subsequent breast cancer after excision in this population is small, and chemoprevention is not indicated.    Plan:     Surgery recommended: Left Breast Excisional biopsy with seed localization (targeting 2 adjacent sites), tentatively 1/13/2022   - Pre-op Testing: CBC, BMP, EKG   - Surgical and social distancing guidelines discussed.    Will request last progress note from [facility] for heart hx, reports heart valve disorders    Discussed recommendation to find therapist on [email or web address]. Discussed follow up with PCP regarding depression. Patient no interested in medications and reports she recently discontinued them.        All of her questions were answered. She was advised to call if she develops any questions or concerns.    Niyah Saunders PA-C          --------------------------------------------------------------------------------------------------------------  Total time on the date of the visit ranged from 60-74 mins (38096). Total time includes both face-to-face and non-face-to-face time personally spent by myself on the day of the " visit.    Non-face-to-face time included:  _X_ preparing to see the patient such as reviewing the patient record  _X_ obtaining and reviewing separately obtained history  _X_ independently interpreting results  _X_ documenting clinical information in electronic health record.    Face-to-face time included:  _X_ performing an appropriate history and examination  _X_ communicating results to the patient  _X_ counseling and educating the patient  _x_ ordering appropriate medications  _x_ ordering appropriate tests  _X_ ordering appropriate procedures (including follow-up)  _X_ answering any questions the patient had    Total Time spent on date of visit: 60 minutes

## 2022-12-27 NOTE — PROGRESS NOTES
[facility] Breast Surg  Breast Surgical Oncology  New Patient Office Visit - H&P      Referring Provider: Dr. Leobardo Molina  PCP: Leobardo Molina MD     Chief Complaint:   Chief Complaint   Patient presents with    Breast Pain     Right breast discomfort, left breast biopsy done on 22, no nipple discharge,       Subjective:     HPI:  Eusebia Miramontes is a 62 y.o. female who presents on 2022 for evaluation of  left breast complex sclerosing lesions .    She currently denies any breast issues including rashes, redness, pain, swelling, nipple discharge, or new lumps/masses.    Imagin2022 SCR MG at [facility] - There is a 0.5 cm oval mass with an indistinct margin in the left breast at 6:00 posterior depth. BI-RADS 0: Incomplete: Left diagnostic mammography with tomosynthesis (to include spot compression views), followed by targeted left breast ultrasound if needed.    2022 L DG MG at [facility] - In the 5:00 left breast posterior depth, there is a 0.6 cm oval mass with circumscribed margins, which corresponds to the mass seen in the 6:00 left breast posterior depth on recent screening mammogram. Targeted US in the 5:00 left breast 6 cm from the nipple reveals two adjacent parallel oval avascular heterogeneous masses with circumscribed margins, which are located approximately 1 cm apart. The larger of the two masses, measuring 0.6 cm x 0.3 cm x 0.5 cm, correlates with the oval mass in the 5:00 left breast posterior depth on mammogram.  The second mass measures 0.5 cm x 0.2 cm x 0.4 cm. BIRADS 4: Left breast 5:00 6 cm from the nipple two adjacent oval circumscribed heterogeneous masses are suspicious for malignancy. Ultrasound guided core needle biopsy of the larger of the two masses in the 5:00 left breast 6 cm from the nipple is recommended.    Pathology:   2022 - Left Breast 5:00 6 cmfn - Complex sclerosing lesion with moderate duct epithelial hyperplasia,  usual type.    Please note that radiology recommends that this pathology is also applied to the additional oval, heterogeneous mass with circumscribed margins in the 5:00 left breast, 6 cm from the nipple.    OB/GYN History:  Age at Menarche Onset: 15  Menopausal Status: postmenopausal, at age 48  Hysterectomy/Oophorectomy: no  Hormonal birth control (duration): 20 years  Pregnancy History:   Age at first live birth: 23  Hormone Replacement Therapy: Yes,  reports progesterone (but likely combination HRT), stopped 5-7 years ago    Other:  # of breast biopsies (when and pathology results): 2022 - [facility]  MG breast density: Category B  Prior thoracic RT: none  Genetic testing: none  Ashkenazi Zoroastrianism descent: No    Family History:  Family History   Problem Relation Age of Onset    Dementia Mother     Stomach cancer Maternal Aunt         diagnosed in her late 70s    Leukemia Paternal Uncle         Patient History:  Past Medical History:   Diagnosis Date    GERD (gastroesophageal reflux disease)     Hyperlipidemia     Hypertension        Past Surgical History:   Procedure Laterality Date    ADENOIDECTOMY      BILATERAL TUBAL LIGATION      PARATHYROIDECTOMY      TONSILLECTOMY         Social History     Socioeconomic History    Marital status:    Tobacco Use    Smoking status: Former     Types: Cigarettes    Smokeless tobacco: Never    Tobacco comments:     Quit smoking in    Substance and Sexual Activity    Alcohol use: Not Currently    Drug use: Yes     Types: Marijuana    Sexual activity: Not Currently       Immunization History   Administered Date(s) Administered    COVID-19, MRNA, LN-S, PF (Pfizer) (Purple Cap) 2021, 2021       Medications/Allergies:    Current Outpatient Medications:     ascorbic acid, vitamin C, (VITAMIN C) 1000 MG tablet, Take 1,000 mg by mouth once daily., Disp: , Rfl:     cyanocobalamin (VITAMIN B-12) 100 MCG tablet, Take 100 mcg by mouth once daily., Disp: , Rfl:      levocetirizine (XYZAL) 5 MG tablet, Take 5 mg by mouth every evening., Disp: , Rfl:     loratadine (CLARITIN) 10 mg tablet, Take 10 mg by mouth once daily., Disp: , Rfl:     metaxalone (SKELAXIN) 800 MG tablet, Take 800 mg by mouth 3 (three) times daily., Disp: , Rfl:     vitamin D (VITAMIN D3) 1000 units Tab, Take 1,000 Units by mouth once daily., Disp: , Rfl:      Review of patient's allergies indicates:  No Known Allergies    Review of Systems:  Review of Systems   Constitutional:  Positive for malaise/fatigue. Negative for chills, fever and weight loss.        Reports fatigue related to mood   HENT:  Negative for congestion and sore throat.    Eyes:  Negative for blurred vision and double vision.   Respiratory:  Negative for cough, hemoptysis, shortness of breath and wheezing.    Cardiovascular:  Negative for chest pain, palpitations and leg swelling.   Gastrointestinal:  Negative for abdominal pain, constipation, diarrhea, heartburn, nausea and vomiting.   Genitourinary:  Negative for dysuria, frequency and hematuria.   Musculoskeletal:  Positive for back pain. Negative for falls, joint pain and myalgias.        Chronic   Skin:  Negative for itching and rash.   Neurological:  Negative for dizziness, sensory change, focal weakness, seizures and headaches.   Endo/Heme/Allergies:  Negative for environmental allergies. Does not bruise/bleed easily.   Psychiatric/Behavioral:  Positive for depression. Negative for suicidal ideas. The patient is nervous/anxious.      Objective:     Vitals:  Vitals:    12/27/22 1333   BP: 123/88   Pulse: 83   Resp: 20   Temp: 98 °F (36.7 °C)     Body mass index is 24.48 kg/m².     Physical Exam:  General: The patient is awake, alert and oriented times three. The patient is well nourished and in no acute distress.  Neck: There is no evidence of palpable cervical, supraclavicular or axillary adenopathy. The neck is supple. The thyroid is not enlarged.  Musculoskeletal: The patient has a  normal range of motion of her bilateral upper extremities.  Chest: Examination of the chest wall fails to reveal any obvious abnormalities.  The lungs are clear to auscultation bilaterally without rales, rhonchi, or wheezing.  Cardiovascular: The heart has a regular rate and rhythm without murmurs, gallops or rubs.  Breast:   Right:  Examination of right breast fails to reveal any dominant masses or areas of significant focal nodularity. The nipple is everted without evidence of discharge. There is no skin dimpling with movement of the pectoralis. There is no significant skin changes overlying the breast.   Left:  Examination of the left breast fails to reveal any dominant masses or areas of significant focal nodularity. The nipple is everted without evidence of discharge. There is no skin dimpling with movement of the pectoralis. There are no significant skin changes overlying the breast.  Abdomen: The abdomen is soft, flat, nontender and nondistended with no palpable masses or organomegaly.  Integumentary: no rashes or skin lesions present  Neurologic: cranial nerves intact, no signs of peripheral neurological deficit, motor/sensory function intact    Assessment and Plan:     Patient Active Problem List   Diagnosis    Benign essential hypertension    Fibromyositis    Degeneration of intervertebral disc of cervical region    Hyperlipidemia    Heart valve disease    Osteoarthritis    Positive antinuclear antibody    Adolescent idiopathic scoliosis of thoracolumbar region        Eusebia was seen today for breast pain.    Diagnoses and all orders for this visit:    Complex sclerosing lesion of left breast  -     Ambulatory referral/consult to Breast Surgery          Radial scars, also called complex sclerosing lesions, are a pathologic diagnosis, usually discovered incidentally when a breast mass or radiologic abnormality is removed or biopsied. Occasionally, radial scars are large enough to be detected on mammography as  "suspicious spiculated masses, which cannot be reliably differentiated from spiculated carcinoma by imaging alone. Radial scars are characterized microscopically by a fibroelastic core with radiating ducts and lobules.    In general, surgical excision is recommended when radial scars or complex sclerosing lesions are diagnosed on core biopsy, based on series showing that 8 to 17 percent of surgical specimens at subsequent excision are positive for malignancy. The current recommendations from the American Society of Breast Surgeons state that most radial scars "should be excised, although imaging follow-up is reasonable for small, image-detected radial scars that are completely removed or well-sampled with large-gauge devices and in the setting of imaging-pathology concordance".  No additional treatment beyond excision is needed for radial scars. The risk of subsequent breast cancer after excision in this population is small, and chemoprevention is not indicated.    Plan:     Surgery recommended: Left Breast Excisional biopsy with seed localization (targeting 2 adjacent sites), tentatively 1/13/2022   - Pre-op Testing: CBC, BMP, EKG   - Surgical and social distancing guidelines discussed.    Will request last progress note from [facility] for heart hx, reports heart valve disorders    Discussed recommendation to find therapist on [email or web address]. Discussed follow up with PCP regarding depression. Patient no interested in medications and reports she recently discontinued them.        All of her questions were answered. She was advised to call if she develops any questions or concerns.    Niyah Saunders PA-C          --------------------------------------------------------------------------------------------------------------  Total time on the date of the visit ranged from 60-74 mins ([id number]). Total time includes both face-to-face and non-face-to-face time personally spent by myself on the day of the " visit.    Non-face-to-face time included:  _X_ preparing to see the patient such as reviewing the patient record  _X_ obtaining and reviewing separately obtained history  _X_ independently interpreting results  _X_ documenting clinical information in electronic health record.    Face-to-face time included:  _X_ performing an appropriate history and examination  _X_ communicating results to the patient  _X_ counseling and educating the patient  _x_ ordering appropriate medications  _x_ ordering appropriate tests  _X_ ordering appropriate procedures (including follow-up)  _X_ answering any questions the patient had    Total Time spent on date of visit: 60 minutes

## 2022-12-28 RX ORDER — SODIUM CHLORIDE, SODIUM LACTATE, POTASSIUM CHLORIDE, CALCIUM CHLORIDE 600; 310; 30; 20 MG/100ML; MG/100ML; MG/100ML; MG/100ML
INJECTION, SOLUTION INTRAVENOUS CONTINUOUS
Status: CANCELLED | OUTPATIENT
Start: 2022-12-28

## 2023-01-11 ENCOUNTER — HOSPITAL ENCOUNTER (OUTPATIENT)
Dept: RADIOLOGY | Facility: HOSPITAL | Age: 63
Discharge: HOME | End: 2023-01-11
Attending: SURGERY
Payer: COMMERCIAL

## 2023-01-11 DIAGNOSIS — N64.89 COMPLEX SCLEROSING LESION OF LEFT BREAST: ICD-10-CM

## 2023-01-11 PROCEDURE — 77065 MAMMO DIGITAL DIAGNOSTIC LEFT WITH TOMO: ICD-10-PCS | Mod: 26,LT,, | Performed by: STUDENT IN AN ORGANIZED HEALTH CARE EDUCATION/TRAINING PROGRAM

## 2023-01-11 PROCEDURE — A4648 IMPLANTABLE TISSUE MARKER: HCPCS

## 2023-01-11 PROCEDURE — 77061 MAMMO DIGITAL DIAGNOSTIC LEFT WITH TOMO: ICD-10-PCS | Mod: 26,LT,, | Performed by: STUDENT IN AN ORGANIZED HEALTH CARE EDUCATION/TRAINING PROGRAM

## 2023-01-11 PROCEDURE — 19285 US LEFT MAGSEED LOCALIZATION: ICD-10-PCS | Mod: LT,,, | Performed by: STUDENT IN AN ORGANIZED HEALTH CARE EDUCATION/TRAINING PROGRAM

## 2023-01-11 PROCEDURE — 77061 BREAST TOMOSYNTHESIS UNI: CPT | Mod: 26,LT,, | Performed by: STUDENT IN AN ORGANIZED HEALTH CARE EDUCATION/TRAINING PROGRAM

## 2023-01-11 PROCEDURE — 77061 BREAST TOMOSYNTHESIS UNI: CPT | Mod: TC,LT

## 2023-01-11 PROCEDURE — 19285 PERQ DEV BREAST 1ST US IMAG: CPT | Mod: LT,,, | Performed by: STUDENT IN AN ORGANIZED HEALTH CARE EDUCATION/TRAINING PROGRAM

## 2023-01-11 PROCEDURE — 77065 DX MAMMO INCL CAD UNI: CPT | Mod: 26,LT,, | Performed by: STUDENT IN AN ORGANIZED HEALTH CARE EDUCATION/TRAINING PROGRAM

## 2023-01-13 ENCOUNTER — HOSPITAL ENCOUNTER (OUTPATIENT)
Dept: RADIOLOGY | Facility: HOSPITAL | Age: 63
Discharge: HOME | End: 2023-01-13
Attending: SURGERY
Payer: COMMERCIAL

## 2023-01-13 ENCOUNTER — ANESTHESIA (OUTPATIENT)
Dept: SURGERY | Facility: HOSPITAL | Age: 63
End: 2023-01-13
Payer: COMMERCIAL

## 2023-01-13 ENCOUNTER — HOSPITAL ENCOUNTER (OUTPATIENT)
Facility: HOSPITAL | Age: 63
Discharge: HOME | End: 2023-01-13
Attending: SURGERY | Admitting: SURGERY
Payer: COMMERCIAL

## 2023-01-13 ENCOUNTER — ANESTHESIA EVENT (OUTPATIENT)
Dept: SURGERY | Facility: HOSPITAL | Age: 63
End: 2023-01-13
Payer: COMMERCIAL

## 2023-01-13 DIAGNOSIS — N64.89 COMPLEX SCLEROSING LESION OF LEFT BREAST: ICD-10-CM

## 2023-01-13 DIAGNOSIS — R92.8 ABNORMAL MAMMOGRAM: ICD-10-CM

## 2023-01-13 PROCEDURE — A4216 STERILE WATER/SALINE, 10 ML: HCPCS | Performed by: NURSE ANESTHETIST, CERTIFIED REGISTERED

## 2023-01-13 PROCEDURE — 36000706: Performed by: SURGERY

## 2023-01-13 PROCEDURE — 63600175 PHARM REV CODE 636 W HCPCS

## 2023-01-13 PROCEDURE — 88305 TISSUE EXAM BY PATHOLOGIST: CPT | Performed by: SURGERY

## 2023-01-13 PROCEDURE — A4217 STERILE WATER/SALINE, 500 ML: HCPCS | Performed by: SURGERY

## 2023-01-13 PROCEDURE — 71000016 HC POSTOP RECOV ADDL HR: Performed by: SURGERY

## 2023-01-13 PROCEDURE — 37000008 HC ANESTHESIA 1ST 15 MINUTES: Performed by: SURGERY

## 2023-01-13 PROCEDURE — 76098 X-RAY EXAM SURGICAL SPECIMEN: CPT | Mod: 26,,, | Performed by: RADIOLOGY

## 2023-01-13 PROCEDURE — 63600175 PHARM REV CODE 636 W HCPCS: Performed by: ANESTHESIOLOGY

## 2023-01-13 PROCEDURE — 63600175 PHARM REV CODE 636 W HCPCS: Performed by: PHYSICIAN ASSISTANT

## 2023-01-13 PROCEDURE — 71000015 HC POSTOP RECOV 1ST HR: Performed by: SURGERY

## 2023-01-13 PROCEDURE — 76098 X-RAY EXAM SURGICAL SPECIMEN: CPT | Mod: TC

## 2023-01-13 PROCEDURE — 71000033 HC RECOVERY, INTIAL HOUR: Performed by: SURGERY

## 2023-01-13 PROCEDURE — 37000009 HC ANESTHESIA EA ADD 15 MINS: Performed by: SURGERY

## 2023-01-13 PROCEDURE — 19125 EXCISION BREAST LESION: CPT | Mod: LT,,, | Performed by: SURGERY

## 2023-01-13 PROCEDURE — 25000003 PHARM REV CODE 250: Performed by: SURGERY

## 2023-01-13 PROCEDURE — C1819 TISSUE LOCALIZATION-EXCISION: HCPCS | Performed by: SURGERY

## 2023-01-13 PROCEDURE — 63600175 PHARM REV CODE 636 W HCPCS: Performed by: SURGERY

## 2023-01-13 PROCEDURE — 19125 PR EXCISE BREAST LES W XRAY MARKER: ICD-10-PCS | Mod: LT,,, | Performed by: SURGERY

## 2023-01-13 PROCEDURE — 63600175 PHARM REV CODE 636 W HCPCS: Performed by: NURSE ANESTHETIST, CERTIFIED REGISTERED

## 2023-01-13 PROCEDURE — 25000003 PHARM REV CODE 250: Performed by: NURSE ANESTHETIST, CERTIFIED REGISTERED

## 2023-01-13 PROCEDURE — 36000707: Performed by: SURGERY

## 2023-01-13 PROCEDURE — 25000003 PHARM REV CODE 250: Performed by: ANESTHESIOLOGY

## 2023-01-13 PROCEDURE — 76098 MAMMO BREAST SPECIMEN: ICD-10-PCS | Mod: 26,,, | Performed by: RADIOLOGY

## 2023-01-13 RX ORDER — CEFAZOLIN SODIUM 1 G/3ML
INJECTION, POWDER, FOR SOLUTION INTRAMUSCULAR; INTRAVENOUS
Status: DISCONTINUED | OUTPATIENT
Start: 2023-01-13 | End: 2023-01-13 | Stop reason: HOSPADM

## 2023-01-13 RX ORDER — HYDROMORPHONE HYDROCHLORIDE 2 MG/ML
0.4 INJECTION, SOLUTION INTRAMUSCULAR; INTRAVENOUS; SUBCUTANEOUS EVERY 5 MIN PRN
Status: DISCONTINUED | OUTPATIENT
Start: 2023-01-13 | End: 2023-01-13 | Stop reason: HOSPADM

## 2023-01-13 RX ORDER — TRAMADOL HYDROCHLORIDE 50 MG/1
50 TABLET ORAL EVERY 4 HOURS PRN
Status: DISCONTINUED | OUTPATIENT
Start: 2023-01-13 | End: 2023-01-13 | Stop reason: HOSPADM

## 2023-01-13 RX ORDER — BUPIVACAINE HYDROCHLORIDE 5 MG/ML
INJECTION, SOLUTION EPIDURAL; INTRACAUDAL
Status: DISCONTINUED | OUTPATIENT
Start: 2023-01-13 | End: 2023-01-13 | Stop reason: HOSPADM

## 2023-01-13 RX ORDER — DIPHENHYDRAMINE HYDROCHLORIDE 50 MG/ML
25 INJECTION INTRAMUSCULAR; INTRAVENOUS ONCE
Status: COMPLETED | OUTPATIENT
Start: 2023-01-13 | End: 2023-01-13

## 2023-01-13 RX ORDER — WATER 1000 ML/1000ML
INJECTION, SOLUTION INTRAVENOUS
Status: DISCONTINUED | OUTPATIENT
Start: 2023-01-13 | End: 2023-01-13 | Stop reason: HOSPADM

## 2023-01-13 RX ORDER — CEFAZOLIN SODIUM 1 G/3ML
INJECTION, POWDER, FOR SOLUTION INTRAMUSCULAR; INTRAVENOUS
Status: DISCONTINUED
Start: 2023-01-13 | End: 2023-01-13 | Stop reason: HOSPADM

## 2023-01-13 RX ORDER — ACETAMINOPHEN 325 MG/1
650 TABLET ORAL EVERY 4 HOURS PRN
Status: DISCONTINUED | OUTPATIENT
Start: 2023-01-13 | End: 2023-01-13 | Stop reason: HOSPADM

## 2023-01-13 RX ORDER — SODIUM CHLORIDE, SODIUM GLUCONATE, SODIUM ACETATE, POTASSIUM CHLORIDE AND MAGNESIUM CHLORIDE 30; 37; 368; 526; 502 MG/100ML; MG/100ML; MG/100ML; MG/100ML; MG/100ML
INJECTION, SOLUTION INTRAVENOUS CONTINUOUS
Status: DISCONTINUED | OUTPATIENT
Start: 2023-01-13 | End: 2023-01-13 | Stop reason: HOSPADM

## 2023-01-13 RX ORDER — DEXAMETHASONE SODIUM PHOSPHATE 4 MG/ML
INJECTION, SOLUTION INTRA-ARTICULAR; INTRALESIONAL; INTRAMUSCULAR; INTRAVENOUS; SOFT TISSUE
Status: DISCONTINUED | OUTPATIENT
Start: 2023-01-13 | End: 2023-01-13

## 2023-01-13 RX ORDER — ONDANSETRON 2 MG/ML
4 INJECTION INTRAMUSCULAR; INTRAVENOUS EVERY 12 HOURS PRN
Status: DISCONTINUED | OUTPATIENT
Start: 2023-01-13 | End: 2023-01-13 | Stop reason: HOSPADM

## 2023-01-13 RX ORDER — MIDAZOLAM HYDROCHLORIDE 1 MG/ML
INJECTION INTRAMUSCULAR; INTRAVENOUS
Status: COMPLETED
Start: 2023-01-13 | End: 2023-01-13

## 2023-01-13 RX ORDER — ONDANSETRON HYDROCHLORIDE 2 MG/ML
INJECTION, SOLUTION INTRAMUSCULAR; INTRAVENOUS
Status: DISCONTINUED | OUTPATIENT
Start: 2023-01-13 | End: 2023-01-13

## 2023-01-13 RX ORDER — TRAMADOL HYDROCHLORIDE 50 MG/1
50 TABLET ORAL EVERY 6 HOURS PRN
Qty: 28 TABLET | Refills: 0 | Status: SHIPPED | OUTPATIENT
Start: 2023-01-13 | End: 2023-01-20

## 2023-01-13 RX ORDER — METHOCARBAMOL 100 MG/ML
1000 INJECTION, SOLUTION INTRAMUSCULAR; INTRAVENOUS ONCE
Status: COMPLETED | OUTPATIENT
Start: 2023-01-13 | End: 2023-01-13

## 2023-01-13 RX ORDER — ONDANSETRON 4 MG/1
4 TABLET, ORALLY DISINTEGRATING ORAL ONCE
Status: COMPLETED | OUTPATIENT
Start: 2023-01-13 | End: 2023-01-13

## 2023-01-13 RX ORDER — PROPOFOL 10 MG/ML
VIAL (ML) INTRAVENOUS
Status: DISCONTINUED | OUTPATIENT
Start: 2023-01-13 | End: 2023-01-13

## 2023-01-13 RX ORDER — MIDAZOLAM HYDROCHLORIDE 1 MG/ML
2 INJECTION INTRAMUSCULAR; INTRAVENOUS ONCE AS NEEDED
Status: COMPLETED | OUTPATIENT
Start: 2023-01-13 | End: 2023-01-13

## 2023-01-13 RX ORDER — WATER FOR INJECTION,STERILE
VIAL (ML) INJECTION
Status: DISCONTINUED
Start: 2023-01-13 | End: 2023-01-13 | Stop reason: HOSPADM

## 2023-01-13 RX ORDER — DIPHENHYDRAMINE HYDROCHLORIDE 50 MG/ML
25 INJECTION INTRAMUSCULAR; INTRAVENOUS ONCE
Status: DISCONTINUED | OUTPATIENT
Start: 2023-01-13 | End: 2023-01-13 | Stop reason: HOSPADM

## 2023-01-13 RX ORDER — LIDOCAINE HYDROCHLORIDE 10 MG/ML
INJECTION, SOLUTION EPIDURAL; INFILTRATION; INTRACAUDAL; PERINEURAL
Status: DISCONTINUED | OUTPATIENT
Start: 2023-01-13 | End: 2023-01-13

## 2023-01-13 RX ORDER — ONDANSETRON 2 MG/ML
4 INJECTION INTRAMUSCULAR; INTRAVENOUS DAILY PRN
Status: DISCONTINUED | OUTPATIENT
Start: 2023-01-13 | End: 2023-01-13 | Stop reason: HOSPADM

## 2023-01-13 RX ORDER — PHENYLEPHRINE HYDROCHLORIDE 10 MG/ML
INJECTION INTRAVENOUS
Status: DISCONTINUED | OUTPATIENT
Start: 2023-01-13 | End: 2023-01-13

## 2023-01-13 RX ORDER — HYDROCODONE BITARTRATE AND ACETAMINOPHEN 5; 325 MG/1; MG/1
1 TABLET ORAL EVERY 4 HOURS PRN
Status: DISCONTINUED | OUTPATIENT
Start: 2023-01-13 | End: 2023-01-13 | Stop reason: HOSPADM

## 2023-01-13 RX ORDER — SODIUM CHLORIDE, SODIUM LACTATE, POTASSIUM CHLORIDE, CALCIUM CHLORIDE 600; 310; 30; 20 MG/100ML; MG/100ML; MG/100ML; MG/100ML
INJECTION, SOLUTION INTRAVENOUS CONTINUOUS
Status: DISCONTINUED | OUTPATIENT
Start: 2023-01-13 | End: 2023-01-13 | Stop reason: HOSPADM

## 2023-01-13 RX ORDER — CEFAZOLIN SODIUM 2 G/50ML
2 SOLUTION INTRAVENOUS
Status: DISCONTINUED | OUTPATIENT
Start: 2023-01-13 | End: 2023-01-13 | Stop reason: HOSPADM

## 2023-01-13 RX ORDER — CEFAZOLIN 2 G/1
INJECTION, POWDER, FOR SOLUTION INTRAMUSCULAR; INTRAVENOUS
Status: DISCONTINUED
Start: 2023-01-13 | End: 2023-01-13 | Stop reason: HOSPADM

## 2023-01-13 RX ORDER — MEPERIDINE HYDROCHLORIDE 25 MG/ML
12.5 INJECTION INTRAMUSCULAR; INTRAVENOUS; SUBCUTANEOUS ONCE
Status: DISCONTINUED | OUTPATIENT
Start: 2023-01-13 | End: 2023-01-13 | Stop reason: HOSPADM

## 2023-01-13 RX ORDER — LIDOCAINE HYDROCHLORIDE 10 MG/ML
1 INJECTION, SOLUTION EPIDURAL; INFILTRATION; INTRACAUDAL; PERINEURAL ONCE
Status: DISCONTINUED | OUTPATIENT
Start: 2023-01-13 | End: 2023-01-13 | Stop reason: HOSPADM

## 2023-01-13 RX ORDER — BUPIVACAINE HYDROCHLORIDE 5 MG/ML
INJECTION, SOLUTION EPIDURAL; INTRACAUDAL
Status: DISCONTINUED
Start: 2023-01-13 | End: 2023-01-13 | Stop reason: HOSPADM

## 2023-01-13 RX ADMIN — MIDAZOLAM HYDROCHLORIDE 2 MG: 1 INJECTION, SOLUTION INTRAMUSCULAR; INTRAVENOUS at 06:01

## 2023-01-13 RX ADMIN — SODIUM CHLORIDE, POTASSIUM CHLORIDE, SODIUM LACTATE AND CALCIUM CHLORIDE: 600; 310; 30; 20 INJECTION, SOLUTION INTRAVENOUS at 06:01

## 2023-01-13 RX ADMIN — PROPOFOL 150 MG: 10 INJECTION, EMULSION INTRAVENOUS at 07:01

## 2023-01-13 RX ADMIN — DIPHENHYDRAMINE HYDROCHLORIDE 6.25 MG: 50 INJECTION INTRAMUSCULAR; INTRAVENOUS at 08:01

## 2023-01-13 RX ADMIN — ONDANSETRON 4 MG: 4 TABLET, ORALLY DISINTEGRATING ORAL at 06:01

## 2023-01-13 RX ADMIN — PHENYLEPHRINE HYDROCHLORIDE 100 MCG: 10 INJECTION INTRAVENOUS at 08:01

## 2023-01-13 RX ADMIN — PHENYLEPHRINE HYDROCHLORIDE 100 MCG: 10 INJECTION INTRAVENOUS at 07:01

## 2023-01-13 RX ADMIN — METHOCARBAMOL 1000 MG: 100 INJECTION, SOLUTION INTRAMUSCULAR; INTRAVENOUS at 08:01

## 2023-01-13 RX ADMIN — ONDANSETRON 4 MG: 2 INJECTION INTRAMUSCULAR; INTRAVENOUS at 08:01

## 2023-01-13 RX ADMIN — DEXMEDETOMIDINE 6 MCG: 200 INJECTION, SOLUTION INTRAVENOUS at 07:01

## 2023-01-13 RX ADMIN — DEXTROSE MONOHYDRATE 2 G: 50 INJECTION, SOLUTION INTRAVENOUS at 07:01

## 2023-01-13 RX ADMIN — MIDAZOLAM HYDROCHLORIDE 2 MG: 1 INJECTION INTRAMUSCULAR; INTRAVENOUS at 06:01

## 2023-01-13 RX ADMIN — LIDOCAINE HYDROCHLORIDE 20 MG: 10 INJECTION, SOLUTION EPIDURAL; INFILTRATION; INTRACAUDAL; PERINEURAL at 07:01

## 2023-01-13 RX ADMIN — SODIUM CHLORIDE, POTASSIUM CHLORIDE, SODIUM LACTATE AND CALCIUM CHLORIDE: 600; 310; 30; 20 INJECTION, SOLUTION INTRAVENOUS at 07:01

## 2023-01-13 RX ADMIN — DEXMEDETOMIDINE 4 MCG: 200 INJECTION, SOLUTION INTRAVENOUS at 07:01

## 2023-01-13 RX ADMIN — SODIUM CHLORIDE, POTASSIUM CHLORIDE, SODIUM LACTATE AND CALCIUM CHLORIDE: 600; 310; 30; 20 INJECTION, SOLUTION INTRAVENOUS at 08:01

## 2023-01-13 RX ADMIN — DEXMEDETOMIDINE 4 MCG: 200 INJECTION, SOLUTION INTRAVENOUS at 08:01

## 2023-01-13 RX ADMIN — DEXAMETHASONE SODIUM PHOSPHATE 8 MG: 4 INJECTION, SOLUTION INTRA-ARTICULAR; INTRALESIONAL; INTRAMUSCULAR; INTRAVENOUS; SOFT TISSUE at 07:01

## 2023-01-13 NOTE — CARE UPDATE
Patient awakened,anthony,rejected oral airway,oriented/reassured her, c/o of slight pain-will medicate per anesthesia orders, exchanging well.

## 2023-01-13 NOTE — BRIEF OP NOTE
[facility]  Brief Operative Note     SUMMARY     Surgery Date: 1/13/2023     Surgeon: Carmen Gomez MD    Assist: JENIFER TiradoC    Medical Student: hCandni Valenzuela MS-3    Pre-op Diagnosis:  Complex sclerosing lesion of left breast [N64.89]    Post-op Diagnosis:  Post-Op Diagnosis Codes:     * Complex sclerosing lesion of left breast [N64.89]    Procedure(s) (LRB):  EXCISION,MASS,BREAST,USING RADIOLOGICAL MARKER Left (Left)    Anesthesia: General    Findings/Key Components: Removal of left breast tissue for pathology evaluation    Estimated Blood Loss: 2 ml         Specimens:   Specimen (24h ago, onward)       Start     Ordered    01/13/23 0753  Specimen to Pathology  RELEASE UPON ORDERING        Comments: Specimen A: Left breast excisional biopsy (charms gillina margin)     References:    Click here for ordering Quick Tip   Question:  Release to patient  Answer:  Immediate    01/13/23 0753                    Discharge Note    SUMMARY     Admit Date: 1/13/2023    Discharge Date and Time:  01/13/2023 7:57 AM    Hospital Course (synopsis of major diagnoses, care, treatment, and services provided during the course of the hospital stay): She is status post left breast excisional biopsy.     Final Diagnosis: Post-Op Diagnosis Codes:     * Complex sclerosing lesion of left breast [N64.89]    Disposition: Home or Self Care    Follow Up/Patient Instructions:    Follow-up Information       JOR[facility] Morales Follow up.    Specialty: Physician Assistant  Why: 1/24/2023 11:20 AM  Contact information:  [address] [place] [id number]  [phone]                             Medications:  Reconciled Home Medications:      Medication List        START taking these medications      traMADoL 50 mg tablet  Commonly known as: ULTRAM  Take 1 tablet (50 mg total) by mouth every 6 (six) hours as needed for Pain.            CONTINUE taking these medications      ascorbic acid (vitamin C)  1000 MG tablet  Commonly known as: VITAMIN C  Take 1,000 mg by mouth once daily.     cyanocobalamin 100 MCG tablet  Commonly known as: VITAMIN B-12  Take 100 mcg by mouth once daily.     levocetirizine 5 MG tablet  Commonly known as: XYZAL  Take 5 mg by mouth every evening.     loratadine 10 mg tablet  Commonly known as: CLARITIN  Take 10 mg by mouth once daily.     metaxalone 800 MG tablet  Commonly known as: SKELAXIN  Take 800 mg by mouth 3 (three) times daily.     vitamin D 1000 units Tab  Commonly known as: VITAMIN D3  Take 1,000 Units by mouth once daily.            Discharge Procedure Orders   Diet general     Ice to affected area     Lifting restrictions     Remove dressing in 24 hours   Order Comments: Leave glue and steri strips until clinic visit     Call MD for:  temperature >100.4     Call MD for:  persistent nausea and vomiting     Call MD for:  severe uncontrolled pain     Call MD for:  difficulty breathing, headache or visual disturbances     Call MD for:  redness, tenderness, or signs of infection (pain, swelling, redness, odor or green/yellow discharge around incision site)     Call MD for:  hives     Call MD for:  persistent dizziness or light-headedness      Follow-up Information       JORGE Morales Follow up.    Specialty: Physician Assistant  Why: 1/24/2023 11:20 AM  Contact information:  [address] [place] [address]  [address]  [facility] [address]  [phone]

## 2023-01-13 NOTE — ANESTHESIA POSTPROCEDURE EVALUATION
Anesthesia Post Evaluation    Patient: Eusebia Miramontes    Procedure(s) Performed: Procedure(s) (LRB):  EXCISION,MASS,BREAST,USING RADIOLOGICAL MARKER Left (Left)    Final Anesthesia Type: general      Patient location during evaluation: PACU  Patient participation: Yes- Able to Participate  Level of consciousness: awake and alert and oriented  Post-procedure vital signs: reviewed and stable  Pain management: adequate  Airway patency: patent  ORI mitigation strategies: Verification of full reversal of neuromuscular block  PONV status at discharge: No PONV  Anesthetic complications: no      Cardiovascular status: blood pressure returned to baseline and stable  Respiratory status: spontaneous ventilation and unassisted  Hydration status: euvolemic  Follow-up not needed.  Comments: [facility]          Vitals Value Taken Time   /75 01/13/23 0956   Temp 36.6 °C (97.9 °F) 01/13/23 0905   Pulse 59 01/13/23 0956   Resp 22 01/13/23 0905   SpO2 98 % 01/13/23 0956   Vitals shown include unvalidated device data.      Event Time   Out of Recovery 09:08:00         Pain/Sienna Score: Pain Rating Prior to Med Admin: 4 (1/13/2023  9:05 AM)  Pain Rating Post Med Admin: 0 (1/13/2023  9:05 AM)  Sienna Score: 10 (1/13/2023  9:05 AM)

## 2023-01-13 NOTE — ANESTHESIA PREPROCEDURE EVALUATION
01/13/2023  Eusebia Miramontes is a 62 y.o., female.  EXCISION,MASS,BREAST,USING RADIOLOGICAL MARKER Left (Left)      Pre-op Assessment    I have reviewed the Patient Summary Reports.     I have reviewed the Nursing Notes. I have reviewed the NPO Status.   I have reviewed the Medications.     Review of Systems  Anesthesia Hx:  No problems with previous Anesthesia  Personal Hx of Anesthesia complications   Hematology/Oncology:  Hematology Normal   Oncology Normal     EENT/Dental:EENT/Dental Normal   Cardiovascular:   Exercise tolerance: good Hypertension  Functional Capacity good / => 4 METS    Pulmonary:  Pulmonary Normal    Renal/:   Denies Chronic Renal Disease.     Hepatic/GI:   GERD    Musculoskeletal:   Arthritis     Neurological:  Neurology Normal    Endocrine:  Endocrine Normal  Denies Morbid Obesity / BMI > 40  Dermatological:  Skin Normal    Psych:  Psychiatric Normal           Physical Exam  General: Alert, Oriented, Well nourished and Cooperative    Airway:  Mallampati: II   Mouth Opening: Normal  TM Distance: Normal  Tongue: Normal  Neck ROM: Normal ROM    Dental:  Intact    Chest/Lungs:  Clear to auscultation, Normal Respiratory Rate    Heart:  Rate: Normal  Rhythm: Regular Rhythm        Anesthesia Plan  Type of Anesthesia, risks & benefits discussed:    Anesthesia Type: Gen Supraglottic Airway  Intra-op Monitoring Plan: Standard ASA Monitors  Post Op Pain Control Plan: multimodal analgesia  Induction:  IV and Inhalation  Airway Plan: Direct  Informed Consent: Informed consent signed with the Patient and all parties understand the risks and agree with anesthesia plan.  All questions answered. Patient consented to blood products? Yes  ASA Score: 3  Day of Surgery Review of History & Physical: H&P Update referred to the surgeon/provider.  Anesthesia Plan Notes: Pt states after one surgery she itched  incessantly, etiol unknown. Will limit fentanyl during surgery as poss. etiol    Ready For Surgery From Anesthesia Perspective.     .

## 2023-01-13 NOTE — ANESTHESIA PROCEDURE NOTES
Intubation    Date/Time: 1/13/2023 7:22 AM  Performed by: Essie John CRNA  Authorized by: Jayesh Singh MD     Intubation:     Induction:  Intravenous    Intubated:  Postinduction    Mask Ventilation:  Easy mask    Attempts:  1    Attempted By:  CRNA    Difficult Airway Encountered?: No      Airway Device:  Supraglottic airway/LMA    Airway Device Size:  3.0    Style/Cuff Inflation:  Cuffed (inflated to minimal occlusive pressure)    Inflation Amount (mL):  18    Placement Verified By:  Capnometry    Complicating Factors:  None    Findings Post-Intubation:  BS equal bilateral

## 2023-01-13 NOTE — INTERVAL H&P NOTE
The patient has been examined and the H&P has been reviewed:    I concur with the findings and no changes have occurred since H&P was written.    Surgery risks, benefits and alternative options discussed and understood by patient/family.        All of questions were answered    Carmen Gomez MD  Breast Surgical Oncology      
[phone]

## 2023-01-13 NOTE — DISCHARGE INSTRUCTIONS
BREAST SURGERY POST-OP INSTRUCTIONS  DR. BISHOP DIANE PA-C     How do I care for my incisions?  You and your caregiver should look at your incision daily. Call your doctor if you see any redness or drainage from your incision.  You will be given a support bra, wear this for 24 hours a day (unless showering or sponge bathing) for comfort and to help decrease amount of swelling. If the bra fits too loose or too tight you may go to your local store a purchase a front opening sports bra that fits snug.   Your incision will be closed with sutures (stitches) under your skin. These sutures dissolve on their own, so they do not need to be removed.  · If you go home with Steri-StripsTM on your incision, they will loosen and fall off by themselves. If they havent fallen off within 14 days, you may remove them.  · If you go home with glue over your sutures (stitches), it will also loosen and peel off, similarly to the Steri-Strips.  ** If you have had a Mastectomy and/or Reconstruction and/or Axillary Lymph Node Dissection:  You may have 1-2 Jamari-Love Drains in place. Please refer to the additional instructions discussing care of your drains.     Is it normal to feel new sensations?  As you are healing, you may feel a several different sensations in your breast. Tenderness, numbness, and twinges are common examples. These sensations usually come and go, and will lessen over time, usually within the first few months after surgery. As you continue to heal, you may feel scar tissue along your incision site. It will feel hard. This is common and will soften over the next several months.     Can I shower?  You can shower 24 hours after your surgery. Taking a warm shower is relaxing and can help decrease discomfort. Use soap when you shower and gently wash your incision. Pat the areas dry with a towel after showering, and leave your incision uncovered, unless you have drainage from your incision. If you  have drainage, call your doctors office.  Do not take tub baths, swim, or use hot tubs or saunas until you discuss it with your doctor at the first appointment after your surgery.    Will I have pain when I am home?  The length of time each person has pain or discomfort varies. You will be given a prescription for pain medication before you go home. Follow the guidelines below to manage your pain.  · Take your medication as directed and as needed.  · Call your doctor if the pain medication prescribed for you doesnt relieve your pain.  · Do not drive or drink alcohol while you are taking prescription pain medication.  · As your incision heals, you will have less pain and need less pain medication. A mild pain reliever such as acetaminophen (Tylenol) or ibuprofen (Advil) will relieve aches and discomfort. However, large quantities of acetaminophen may be harmful to your liver. Do not take more acetaminophen than the amount directed on the bottle or as instructed by your doctor or nurse.  · Pain medication should help you as you resume your normal activities. Pain medication is most effective 30 to 45 minutes after taking it.  · Keep track of when you take your pain medication. Taking it when your pain first begins is more effective than waiting for the pain to get worse.  Pain medication may cause constipation (having fewer bowel movements than what is normal for you).    How can I prevent constipation?  · Go to the bathroom at the same time every day. Your body will get used to going at that time.  · If you feel the urge to go, do not put it off. Try to use the bathroom 5 to 15 minutes after meals.  · After breakfast is a good time to move your bowels because the reflexes in your colon are strongest then.  · Exercise if you can; walking is an excellent form of exercise.  · Drink 8 (8-ounce) glasses (2 liters) of liquids daily, if you can. Drink water, juices, soups, ice cream shakes, and other drinks that do not  have caffeine. Beverages with caffeine, such as coffee and soda, pull fluid out of the body.  · Slowly increase the fiber in your diet to 25 to 35 grams per day. Fruits, vegetables, whole grains, and cereals contain fiber. If you have an ostomy or have had recent bowel surgery, check with your doctor or nurse before making any changes in your diet.  · Both over-the-counter and prescription medications are available to treat constipation. Start with 1 of the following over-the-counter medications first:  o Docusate sodium (Colace®) 100 mg. Take __1___ capsules __1___ time a day. This is a stool softener that causes few side effects. Do not take it with mineral oil.  o Polyethylene glycol (MiraLAX®) 17 grams daily.  o Senna (Senokot®) 2 tablets at bedtime. This is a stimulant laxative, which can cause cramping.  · If you havent had a bowel movement in 2 days, call your doctor or nurse.    Will I be able to eat?  You can resume eating when you go home after surgery. Eating a balanced diet high in protein will help you heal after surgery. Your diet should include a healthy protein source at each meal, as well as fruits, vegetables, and whole grains. If you have questions about your diet, ask to see a dietitian.    When is it safe for me to drive and what activities can I perform?  You may resume driving after surgery as long as you are not taking prescription pain medication that may make you drowsy, and you have your full range of motion.  You should not lift anything heavier than 10-15 lbs the first week. After this time, you may gradually increase the amount of weight. You want to take it easy the first 2 weeks, no strenuous or repetitive movements such as vacuuming or scrubbing. Walking is okay. Ask the doctor if you have questions.    How long until I have the pathology results?  The pathology report usually takes to 7 to 10 business days.    When is my first appointment after my surgery?  You should be given or  schedule a follow-up appointment 1 to 2 weeks after your surgery.    How can I cope with my feelings?   After surgery for a serious illness, you may have new and upsetting feelings. Many patients say they felt sad, worried, nervous, irritable, or angry at one time or another. You may find that you cannot control some of these feelings. If this happens, its a good idea to seek emotional support.  The first step in coping is to talk about how you feel. Family and friends can help. Your nurse, doctor, and  can reassure, support, and guide you. It is always a good idea to let these professionals know how you, your family, and your friends are feeling emotionally. Many resources are available to patients and their families. Whether you are in the hospital or at home, we are here to help you and your family and friends handle the emotional aspects of your illness.    What if I have other questions?  If you have any questions or concerns, please talk with your doctor or nurse. You can reach them Monday through Thursday from 9:00 AM to 5:00 PM and Friday from 9:00 AM to 12:00 PM at [phone].  After 5:00 PM M-Th or 12:00 PM Fri, during the weekend, and on holidays, please call [phone] and ask for the doctor on call.    PLEASE CALL YOUR DOCTOR OR NURSE IF YOU HAVE:  · A temperature of 101° F (38.3° C) or higher  · Shortness of breath  · Warmer than normal skin around your incision  · Increased discomfort in the area  · Increased redness around your incision  · New or increased swelling around your incision  · Discharge from your incision

## 2023-01-13 NOTE — TRANSFER OF CARE
Anesthesia Transfer of Care Note    Patient: Eusebia Miramontes    Procedure(s) Performed: Procedure(s) (LRB):  EXCISION,MASS,BREAST,USING RADIOLOGICAL MARKER Left (Left)    Patient location: PACU    Anesthesia Type: general    Transport from OR: Transported from OR on room air with adequate spontaneous ventilation    Post pain: adequate analgesia    Post assessment: no apparent anesthetic complications and tolerated procedure well    Post vital signs: stable    Level of consciousness: sedated    Nausea/Vomiting: no nausea/vomiting    Complications: none    Transfer of care protocol was followed

## 2023-01-13 NOTE — OP NOTE
DATE OF PROCEDURE: 1/13/2023    SURGEON: Carmen Gomez M.D.    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 Left breast excisional biopsy with MagSeed localization     EXCISION,MASS,BREAST,USING RADIOLOGICAL MARKER Left:        PROCEDURE IN DETAIL:   Eusebia Miramontes is a 62 y.o. female brought to the operating room for definitive surgical management of recent core biopsy revealing left breast complex sclerosing lesion. The patient has elected to undergo excisional biopsy for further evaluation. The patient was informed of the possible risks and complications of the procedure, including but not limited to anesthetic risks, bleeding, infection, and need for additional surgery. The patient concurred with the proposed plan, and has given informed consent. The site of surgery was properly noted/marked in the preoperative holding area.    The patient underwent informed consent. The MagSeed was placed in the lower outer quadrant of the left breast. The MagSeed localization films were reviewed.    She was then brought to the Operating Room and placed in the supine position. Anesthesia was administered. The left breast, anterior chest, arm and axilla were then prepped and draped in a sterile fashion.     Attention was turned to the left breast itself. An incision was made in the lower outer quadrant of the left breast over the anticipated tract of the seed. The specimen was dissected circumferentially around the seed and area of concern. The dissection was carried out circumferentially to include the seed. The specimen was completely dissected free. The seed localized specimen was marked with  sterile margin charms and submitted for specimen radiograph. Specimen radiograph confirmed the seed, clip and area of interest were within the specimen.    Within the lumpectomy cavity, hemostasis was achieved with cautery. The wound was irrigated until clear. There was no evidence of bleeding. It was closed in multiple layers with deep dermal and subcutaneous interrupted 3-0 Monocryl sutures and a running 4-0 Monocryl subcuticular skin closure. Dermabond was applied followed by sterile dressings.    All instruments and sponge counts were correct at the end of the procedure.     Significant Surgical Tasks Conducted by the Assistant(s), if Applicable: The skilled assistance of the Physician Assistant, Niyah Saunders PA-C, was necessary for the successful completion of this case. She was essential for proper positioning of the patient, manipulation of instruments, proper exposure, manipulation of tissue, and wound closure.       ESTIMATED BLOOD LOSS: 2 ml    Implants: * No implants in log *    Specimens:   Specimen (24h ago, onward)       Start     Ordered    01/13/23 8393  Specimen to Pathology  RELEASE UPON ORDERING        Comments: Specimen A: Left breast excisional biopsy (charms gillian margin)     References:    Click here for ordering Quick Tip   Question:  Release to patient  Answer:  Immediate    01/13/23 1367                            Condition: Good    Disposition: PACU - hemodynamically stable.    Attestation: I was present and scrubbed for the entire procedure.

## 2023-01-13 NOTE — CARE UPDATE
Received patient from the OR, she is asleep, oral airway in place, chin lift not necessary at present-nurse remains at bedside with constant assessment and observation,respirations full-regular-deep-clear,hob up 30 degrees.

## 2023-01-14 VITALS
TEMPERATURE: 98 F | HEART RATE: 59 BPM | HEIGHT: 63 IN | RESPIRATION RATE: 18 BRPM | SYSTOLIC BLOOD PRESSURE: 113 MMHG | BODY MASS INDEX: 23.94 KG/M2 | OXYGEN SATURATION: 98 % | WEIGHT: 135.13 LBS | DIASTOLIC BLOOD PRESSURE: 75 MMHG

## 2023-01-17 ENCOUNTER — OFFICE VISIT (OUTPATIENT)
Dept: SURGERY | Facility: CLINIC | Age: 63
End: 2023-01-17
Payer: COMMERCIAL

## 2023-01-17 VITALS
RESPIRATION RATE: 20 BRPM | WEIGHT: 136.19 LBS | OXYGEN SATURATION: 99 % | HEIGHT: 63 IN | BODY MASS INDEX: 24.13 KG/M2 | TEMPERATURE: 98 F | DIASTOLIC BLOOD PRESSURE: 92 MMHG | HEART RATE: 97 BPM | SYSTOLIC BLOOD PRESSURE: 143 MMHG

## 2023-01-17 DIAGNOSIS — N64.89 HEMATOMA OF LEFT BREAST: ICD-10-CM

## 2023-01-17 DIAGNOSIS — N64.89 COMPLEX SCLEROSING LESION OF LEFT BREAST: Primary | ICD-10-CM

## 2023-01-17 LAB
DHEA SERPL-MCNC: NORMAL
ESTROGEN SERPL-MCNC: NORMAL PG/ML
INSULIN SERPL-ACNC: NORMAL U[IU]/ML
LAB AP CLINICAL INFORMATION: NORMAL
LAB AP GROSS DESCRIPTION: NORMAL
LAB AP REPORT FOOTNOTES: NORMAL
T3RU NFR SERPL: NORMAL %

## 2023-01-17 PROCEDURE — 99999 PR PBB SHADOW E&M-EST. PATIENT-LVL III: ICD-10-PCS | Mod: PBBFAC,,, | Performed by: PHYSICIAN ASSISTANT

## 2023-01-17 PROCEDURE — 3077F SYST BP >= 140 MM HG: CPT | Mod: CPTII,S$GLB,, | Performed by: PHYSICIAN ASSISTANT

## 2023-01-17 PROCEDURE — 3008F BODY MASS INDEX DOCD: CPT | Mod: CPTII,S$GLB,, | Performed by: PHYSICIAN ASSISTANT

## 2023-01-17 PROCEDURE — 99024 PR POST-OP FOLLOW-UP VISIT: ICD-10-PCS | Mod: S$GLB,,, | Performed by: PHYSICIAN ASSISTANT

## 2023-01-17 PROCEDURE — 3008F PR BODY MASS INDEX (BMI) DOCUMENTED: ICD-10-PCS | Mod: CPTII,S$GLB,, | Performed by: PHYSICIAN ASSISTANT

## 2023-01-17 PROCEDURE — 3080F PR MOST RECENT DIASTOLIC BLOOD PRESSURE >= 90 MM HG: ICD-10-PCS | Mod: CPTII,S$GLB,, | Performed by: PHYSICIAN ASSISTANT

## 2023-01-17 PROCEDURE — 1159F PR MEDICATION LIST DOCUMENTED IN MEDICAL RECORD: ICD-10-PCS | Mod: CPTII,S$GLB,, | Performed by: PHYSICIAN ASSISTANT

## 2023-01-17 PROCEDURE — 1159F MED LIST DOCD IN RCRD: CPT | Mod: CPTII,S$GLB,, | Performed by: PHYSICIAN ASSISTANT

## 2023-01-17 PROCEDURE — 3077F PR MOST RECENT SYSTOLIC BLOOD PRESSURE >= 140 MM HG: ICD-10-PCS | Mod: CPTII,S$GLB,, | Performed by: PHYSICIAN ASSISTANT

## 2023-01-17 PROCEDURE — 99999 PR PBB SHADOW E&M-EST. PATIENT-LVL III: CPT | Mod: PBBFAC,,, | Performed by: PHYSICIAN ASSISTANT

## 2023-01-17 PROCEDURE — 3080F DIAST BP >= 90 MM HG: CPT | Mod: CPTII,S$GLB,, | Performed by: PHYSICIAN ASSISTANT

## 2023-01-17 PROCEDURE — 99024 POSTOP FOLLOW-UP VISIT: CPT | Mod: S$GLB,,, | Performed by: PHYSICIAN ASSISTANT

## 2023-01-17 NOTE — PROGRESS NOTES
[facility] Breast Surg  Breast Surgical Oncology  Follow Up Patient Office Visit - H&P      Referring Provider: No ref. provider found  PCP: Leobardo Molina MD     Chief Complaint:   Chief Complaint   Patient presents with    Post-op Evaluation     Patient complaining redness,bruising on incision site and near nipple left breast, bra causing pain taking Ibuprofen and Tylenol, also complaining of right breast intermittent pain, shooting pain, patient states that she can't take Tramadol because it makes her feel like she's flying      Subjective:     Treatments:  Left Breast Excisional Biopsy 2023    Interval History:  2023 - Eusebia Miramontes returns today to evaluate concerns about redness to the left breast. She is s/p left breast excisional biopsy. Surgical pathology is pending.    HPI:  Eusebia Miramontes is a 62 y.o. female who presents on 2022 for evaluation of  left breast complex sclerosing lesions .    She currently denies any breast issues including rashes, redness, pain, swelling, nipple discharge, or new lumps/masses.    Imagin2022 SCR MG at [facility] - There is a 0.5 cm oval mass with an indistinct margin in the left breast at 6:00 posterior depth. BI-RADS 0: Incomplete: Left diagnostic mammography with tomosynthesis (to include spot compression views), followed by targeted left breast ultrasound if needed.    2022 L DG MG at [facility] - In the 5:00 left breast posterior depth, there is a 0.6 cm oval mass with circumscribed margins, which corresponds to the mass seen in the 6:00 left breast posterior depth on recent screening mammogram. Targeted US in the 5:00 left breast 6 cm from the nipple reveals two adjacent parallel oval avascular heterogeneous masses with circumscribed margins, which are located approximately 1 cm apart. The larger of the two masses, measuring 0.6 cm x 0.3 cm x 0.5 cm, correlates with the oval mass in the 5:00 left breast posterior  depth on mammogram.  The second mass measures 0.5 cm x 0.2 cm x 0.4 cm. BIRADS 4: Left breast 5:00 6 cm from the nipple two adjacent oval circumscribed heterogeneous masses are suspicious for malignancy. Ultrasound guided core needle biopsy of the larger of the two masses in the 5:00 left breast 6 cm from the nipple is recommended.    Pathology:   2022 - Left Breast 5:00 6 cmfn - Complex sclerosing lesion with moderate duct epithelial hyperplasia, usual type.    Please note that radiology recommends that this pathology is also applied to the additional oval, heterogeneous mass with circumscribed margins in the 5:00 left breast, 6 cm from the nipple.    OB/GYN History:  Age at Menarche Onset: 15  Menopausal Status: postmenopausal, at age 48  Hysterectomy/Oophorectomy: no  Hormonal birth control (duration): 20 years  Pregnancy History:   Age at first live birth: 23  Hormone Replacement Therapy: Yes,  reports progesterone (but likely combination HRT), stopped 5-7 years ago    Other:  # of breast biopsies (when and pathology results): 2022 - [facility]  MG breast density: Category B  Prior thoracic RT: none  Genetic testing: none  Ashkenazi Religion descent: No    Family History:  Family History   Problem Relation Age of Onset    Dementia Mother     Stomach cancer Maternal Aunt         diagnosed in her late 70s    Leukemia Paternal Uncle         Patient History:  Past Medical History:   Diagnosis Date    Breast cyst     left breast    Fibromyalgia     GERD (gastroesophageal reflux disease)     Hyperlipidemia     Hypertension     Systemic lupus erythematosus, organ or system involvement unspecified        Past Surgical History:   Procedure Laterality Date    ADENOIDECTOMY      BILATERAL TUBAL LIGATION      EXCISION, MASS, BREAST, USING RADIOLOGICAL MARKER Left 2023    Procedure: EXCISION,MASS,BREAST,USING RADIOLOGICAL MARKER Left;  Surgeon: Carmen Gomez MD;  Location: [facility];  Service: General;   Laterality: Left;    PARATHYROIDECTOMY      TONSILLECTOMY         Social History     Socioeconomic History    Marital status:    Tobacco Use    Smoking status: Former     Types: Cigarettes    Smokeless tobacco: Never    Tobacco comments:     Quit smoking in 1991   Substance and Sexual Activity    Alcohol use: Not Currently     Comment: socially    Drug use: Not Currently    Sexual activity: Not Currently       Immunization History   Administered Date(s) Administered    COVID-19, MRNA, LN-S, PF (Pfizer) (Purple Cap) 03/05/2021, 03/26/2021       Medications/Allergies:    Current Outpatient Medications:     ascorbic acid, vitamin C, (VITAMIN C) 1000 MG tablet, Take 1,000 mg by mouth once daily., Disp: , Rfl:     cyanocobalamin (VITAMIN B-12) 100 MCG tablet, Take 100 mcg by mouth once daily., Disp: , Rfl:     levocetirizine (XYZAL) 5 MG tablet, Take 5 mg by mouth every evening., Disp: , Rfl:     loratadine (CLARITIN) 10 mg tablet, Take 10 mg by mouth once daily., Disp: , Rfl:     metaxalone (SKELAXIN) 800 MG tablet, Take 800 mg by mouth 3 (three) times daily., Disp: , Rfl:     vitamin D (VITAMIN D3) 1000 units Tab, Take 1,000 Units by mouth once daily., Disp: , Rfl:     traMADoL (ULTRAM) 50 mg tablet, Take 1 tablet (50 mg total) by mouth every 6 (six) hours as needed for Pain. (Patient not taking: Reported on 1/17/2023), Disp: 28 tablet, Rfl: 0     Review of patient's allergies indicates:  No Known Allergies    Review of Systems:  Review of Systems   Constitutional:  Positive for malaise/fatigue. Negative for chills, fever and weight loss.        Reports fatigue related to mood   HENT:  Negative for congestion and sore throat.    Eyes:  Negative for blurred vision and double vision.   Respiratory:  Negative for cough, hemoptysis, shortness of breath and wheezing.    Cardiovascular:  Negative for chest pain, palpitations and leg swelling.   Gastrointestinal:  Negative for abdominal pain, constipation, diarrhea,  heartburn, nausea and vomiting.   Genitourinary:  Negative for dysuria, frequency and hematuria.   Musculoskeletal:  Positive for back pain. Negative for falls, joint pain and myalgias.        Chronic   Skin:  Negative for itching and rash.   Neurological:  Negative for dizziness, sensory change, focal weakness, seizures and headaches.   Endo/Heme/Allergies:  Negative for environmental allergies. Does not bruise/bleed easily.   Psychiatric/Behavioral:  Positive for depression. Negative for suicidal ideas. The patient is nervous/anxious.      Objective:     Vitals:  Vitals:    01/17/23 1036   BP: (!) 143/92   Pulse: 97   Resp: 20   Temp: 98.2 °F (36.8 °C)     Body mass index is 24.13 kg/m².     Physical Exam:  General: The patient is awake, alert and oriented times three. The patient is well nourished and in no acute distress.    Musculoskeletal: The patient has a normal range of motion of her bilateral upper extremities.    Breast: The incision is healing well. No tenderness to palpation. No swelling or redness. There is resolving ecchymosis present related to a hematoma of the skin near the lumpectomy site.  Integumentary: no rashes or skin lesions present  Neurologic: cranial nerves intact, no signs of peripheral neurological deficit, motor/sensory function intact    Assessment and Plan:     Patient Active Problem List   Diagnosis    Benign essential hypertension    Fibromyositis    Degeneration of intervertebral disc of cervical region    Hyperlipidemia    Heart valve disease    Osteoarthritis    Positive antinuclear antibody    Adolescent idiopathic scoliosis of thoracolumbar region    History of kidney stones      Eusebia was seen today for post-op evaluation.    Diagnoses and all orders for this visit:    Complex sclerosing lesion of left breast    Hematoma of left breast         Plan:     Reassurance provided that resolving ecchymosis is related to a mild hematoma which is already showing signs of healing.  Discussed her incision is healing well and she is progressing as expected.    Discussed recommendation to continue alternating tylenol and ibuprofen for pain. Also recommend icing the surgery site to reduce tenderness and for the hematoma. Continue supportive bra wear at all times except to shower.    RTC in 1 week for post op.        All of her questions were answered. She was advised to call if she develops any questions or concerns.    Niyah Saunders PA-C          --------------------------------------------------------------------------------------------------------------  Total time on the date of the visit ranged from 60-74 mins (73046). Total time includes both face-to-face and non-face-to-face time personally spent by myself on the day of the visit.    Non-face-to-face time included:  _X_ preparing to see the patient such as reviewing the patient record  _X_ obtaining and reviewing separately obtained history  _X_ independently interpreting results  _X_ documenting clinical information in electronic health record.    Face-to-face time included:  _X_ performing an appropriate history and examination  _X_ communicating results to the patient  _X_ counseling and educating the patient  _x_ ordering appropriate medications  _x_ ordering appropriate tests  _X_ ordering appropriate procedures (including follow-up)  _X_ answering any questions the patient had    Total Time spent on date of visit: 60 minutes

## 2023-01-18 ENCOUNTER — TELEPHONE (OUTPATIENT)
Dept: SURGERY | Facility: CLINIC | Age: 63
End: 2023-01-18
Payer: COMMERCIAL

## 2023-01-18 NOTE — TELEPHONE ENCOUNTER
Patient called with her pathology results.        ----- Message from Carmen Gomez MD sent at 1/17/2023  6:00 PM CST -----  Please call the patient and inform pathology results revealed benign findings and no evidence of cancer. Further discussion will be had at their post-op/follow-up appointment.

## 2023-01-20 NOTE — PROGRESS NOTES
[facility] Breast Surg  Breast Surgical Oncology  Follow Up Patient Office Visit - H&P      Referring Provider: No ref. provider found  PCP: Leobardo Molina MD     Chief Complaint:   No chief complaint on file.     Subjective:     Treatments:  2023 - Left Breast Excisional Biopsy targeting two adjacent masses in the 5:00 position 6cmfn    Interval History:  2023 - Eusebia Miramontes returns today for postop visit. She is s/p left breast excisional biopsy on 2023. Surgical pathology revealed residual complex sclerosing lesion with focal atypical ductal hyperplasia. Her lifetime risk for breast cancer was re-calculated to include ADH and is found to be elevated according to the Tyrer Cuzick model (34.1% on v7 and 28.1% on v8).    HPI:  Eusebia Miramontes is a 62 y.o. female who presents on 2022 for evaluation of  left breast complex sclerosing lesions .     She currently denies any breast issues including rashes, redness, pain, swelling, nipple discharge, or new lumps/masses.    Imagin2022 SCR MG at [facility] - There is a 0.5 cm oval mass with an indistinct margin in the left breast at 6:00 posterior depth. BI-RADS 0: Incomplete: Left diagnostic mammography with tomosynthesis (to include spot compression views), followed by targeted left breast ultrasound if needed.    2022 L DG MG at [facility] - In the 5:00 left breast posterior depth, there is a 0.6 cm oval mass with circumscribed margins, which corresponds to the mass seen in the 6:00 left breast posterior depth on recent screening mammogram. Targeted US in the 5:00 left breast 6 cm from the nipple reveals two adjacent parallel oval avascular heterogeneous masses with circumscribed margins, which are located approximately 1 cm apart. The larger of the two masses, measuring 0.6 cm x 0.3 cm x 0.5 cm, correlates with the oval mass in the 5:00 left breast posterior depth on mammogram.  The second mass measures 0.5 cm x  0.2 cm x 0.4 cm. BIRADS 4: Left breast 5:00 6 cm from the nipple two adjacent oval circumscribed heterogeneous masses are suspicious for malignancy. Ultrasound guided core needle biopsy of the larger of the two masses in the 5:00 left breast 6 cm from the nipple is recommended.    Pathology:   2022 - Left Breast 5:00 6 cmfn - Complex sclerosing lesion with moderate duct epithelial hyperplasia, usual type.    Please note that radiology recommends that this pathology is also applied to the additional oval, heterogeneous mass with circumscribed margins in the 5:00 left breast, 6 cm from the nipple.    2023 Left Breast Excisional Biopsy - residual complex sclerosing lesion with focal atypical ductal hyperplasia. The margins are free of atypical proliferation.    OB/GYN History:  Age at Menarche Onset: 15  Menopausal Status: postmenopausal, at age 48  Hysterectomy/Oophorectomy: no  Hormonal birth control (duration): 20 years  Pregnancy History:   Age at first live birth: 23  Hormone Replacement Therapy: Yes,  reports progesterone (but likely combination HRT), stopped 5-7 years ago    Other:  # of breast biopsies (when and pathology results): 2022 - [facility]  MG breast density: Category B  Prior thoracic RT: none  Genetic testing: none  Ashkenazi Zoroastrianism descent: No    Family History:  Family History   Problem Relation Age of Onset    Dementia Mother     Stomach cancer Maternal Aunt         diagnosed in her late 70s    Leukemia Paternal Uncle         Patient History:  Past Medical History:   Diagnosis Date    Breast cyst     left breast    Fibromyalgia     GERD (gastroesophageal reflux disease)     Hyperlipidemia     Hypertension     Systemic lupus erythematosus, organ or system involvement unspecified        Past Surgical History:   Procedure Laterality Date    ADENOIDECTOMY      BILATERAL TUBAL LIGATION      EXCISION, MASS, BREAST, USING RADIOLOGICAL MARKER Left 2023    Procedure:  EXCISION,MASS,BREAST,USING RADIOLOGICAL MARKER Left;  Surgeon: Carmen Gomez MD;  Location: [facility] OR;  Service: General;  Laterality: Left;    PARATHYROIDECTOMY      TONSILLECTOMY         Social History     Socioeconomic History    Marital status:    Tobacco Use    Smoking status: Former     Types: Cigarettes    Smokeless tobacco: Never    Tobacco comments:     Quit smoking in 1991   Substance and Sexual Activity    Alcohol use: Not Currently     Comment: socially    Drug use: Not Currently    Sexual activity: Not Currently       Immunization History   Administered Date(s) Administered    COVID-19, MRNA, LN-S, PF (Pfizer) (Purple Cap) 03/05/2021, 03/26/2021       Medications/Allergies:    Current Outpatient Medications:     ascorbic acid, vitamin C, (VITAMIN C) 1000 MG tablet, Take 1,000 mg by mouth once daily., Disp: , Rfl:     cyanocobalamin (VITAMIN B-12) 100 MCG tablet, Take 100 mcg by mouth once daily., Disp: , Rfl:     levocetirizine (XYZAL) 5 MG tablet, Take 5 mg by mouth every evening., Disp: , Rfl:     loratadine (CLARITIN) 10 mg tablet, Take 10 mg by mouth once daily., Disp: , Rfl:     metaxalone (SKELAXIN) 800 MG tablet, Take 800 mg by mouth 3 (three) times daily., Disp: , Rfl:     traMADoL (ULTRAM) 50 mg tablet, Take 1 tablet (50 mg total) by mouth every 6 (six) hours as needed for Pain. (Patient not taking: Reported on 1/17/2023), Disp: 28 tablet, Rfl: 0    vitamin D (VITAMIN D3) 1000 units Tab, Take 1,000 Units by mouth once daily., Disp: , Rfl:      Review of patient's allergies indicates:  No Known Allergies    Review of Systems:  Review of Systems   Constitutional:  Positive for malaise/fatigue. Negative for chills, fever and weight loss.        Reports fatigue related to mood   HENT:  Negative for congestion and sore throat.    Eyes:  Negative for blurred vision and double vision.   Respiratory:  Negative for cough, hemoptysis, shortness of breath and wheezing.    Cardiovascular:   Negative for chest pain, palpitations and leg swelling.   Gastrointestinal:  Negative for abdominal pain, constipation, diarrhea, heartburn, nausea and vomiting.   Genitourinary:  Negative for dysuria, frequency and hematuria.   Musculoskeletal:  Positive for back pain. Negative for falls, joint pain and myalgias.        Chronic   Skin:  Negative for itching and rash.   Neurological:  Negative for dizziness, sensory change, focal weakness, seizures and headaches.   Endo/Heme/Allergies:  Negative for environmental allergies. Does not bruise/bleed easily.   Psychiatric/Behavioral:  Positive for depression. Negative for suicidal ideas. The patient is nervous/anxious.      Objective:     Vitals:  There were no vitals filed for this visit.    There is no height or weight on file to calculate BMI.     Physical Exam:  General: The patient is awake, alert and oriented times three. The patient is well nourished and in no acute distress.    Musculoskeletal: The patient has a normal range of motion of her bilateral upper extremities.    Breast: The incision is healing well. No swelling or redness. Ecchymosis resolved.  Integumentary: no rashes or skin lesions present  Neurologic: cranial nerves intact, no signs of peripheral neurological deficit, motor/sensory function intact    Assessment and Plan:     Patient Active Problem List   Diagnosis    Benign essential hypertension    Fibromyositis    Degeneration of intervertebral disc of cervical region    Hyperlipidemia    Heart valve disease    Osteoarthritis    Positive antinuclear antibody    Adolescent idiopathic scoliosis of thoracolumbar region    History of kidney stones      There are no diagnoses linked to this encounter.    Atypical ductal hyperplasia (ADH) is not a form of breast cancer, but is a marker or risk factor for developing breast cancer in the future. Women with atypical hyperplasia have a lifetime risk of breast cancer that is about four times higher than  that of women who don't have atypical hyperplasia:     At 5 years after diagnosis, about 7 percent of women with atypical hyperplasia may develop breast cancer.   At 10 years after diagnosis, about 13 percent of women with atypical hyperplasia may develop breast cancer.   At 25 years after diagnosis, about 30 percent of women with atypical hyperplasia may develop breast cancer.     Preventative treatment with medications such as tamoxifen or raloxifene (Evista), or aromatase inhibitors, such as exemestane (Aromasin) and anastrozole (Arimidex) for five years may reduce the risk of breast cancer.       Plan:     Discussed recommendation to continue alternating tylenol and ibuprofen for pain. May continue to ice breast or apply heating pad to area of soreness. Continue supportive bra wear at all times except to shower.    Referral to medical oncology to discussed chemoprevention for finding of focal ADH on final pathology.    SCR MG due 11/2023. RTC after MG for high risk consultation.        All of her questions were answered. She was advised to call if she develops any questions or concerns.    Niyah Saunders PA-C          --------------------------------------------------------------------------------------------------------------  Total time on the date of the visit ranged from 60-74 mins (06046). Total time includes both face-to-face and non-face-to-face time personally spent by myself on the day of the visit.    Non-face-to-face time included:  _X_ preparing to see the patient such as reviewing the patient record  _X_ obtaining and reviewing separately obtained history  _X_ independently interpreting results  _X_ documenting clinical information in electronic health record.    Face-to-face time included:  _X_ performing an appropriate history and examination  _X_ communicating results to the patient  _X_ counseling and educating the patient  _x_ ordering appropriate medications  _x_ ordering appropriate  tests  _X_ ordering appropriate procedures (including follow-up)  _X_ answering any questions the patient had    Total Time spent on date of visit: 60 minutes

## 2023-01-24 ENCOUNTER — OFFICE VISIT (OUTPATIENT)
Dept: SURGERY | Facility: CLINIC | Age: 63
End: 2023-01-24
Payer: COMMERCIAL

## 2023-01-24 VITALS
HEART RATE: 87 BPM | RESPIRATION RATE: 18 BRPM | BODY MASS INDEX: 23.99 KG/M2 | TEMPERATURE: 98 F | SYSTOLIC BLOOD PRESSURE: 137 MMHG | HEIGHT: 63 IN | DIASTOLIC BLOOD PRESSURE: 88 MMHG | WEIGHT: 135.38 LBS | OXYGEN SATURATION: 99 %

## 2023-01-24 DIAGNOSIS — N64.89 COMPLEX SCLEROSING LESION OF LEFT BREAST: ICD-10-CM

## 2023-01-24 DIAGNOSIS — N60.92 ATYPICAL DUCTAL HYPERPLASIA OF LEFT BREAST: Primary | ICD-10-CM

## 2023-01-24 DIAGNOSIS — Z12.31 SCREENING MAMMOGRAM, ENCOUNTER FOR: ICD-10-CM

## 2023-01-24 PROCEDURE — 99999 PR PBB SHADOW E&M-EST. PATIENT-LVL IV: ICD-10-PCS | Mod: PBBFAC,,, | Performed by: PHYSICIAN ASSISTANT

## 2023-01-24 PROCEDURE — 99024 PR POST-OP FOLLOW-UP VISIT: ICD-10-PCS | Mod: S$GLB,,, | Performed by: PHYSICIAN ASSISTANT

## 2023-01-24 PROCEDURE — 4010F PR ACE/ARB THEARPY RXD/TAKEN: ICD-10-PCS | Mod: CPTII,S$GLB,, | Performed by: PHYSICIAN ASSISTANT

## 2023-01-24 PROCEDURE — 3075F PR MOST RECENT SYSTOLIC BLOOD PRESS GE 130-139MM HG: ICD-10-PCS | Mod: CPTII,S$GLB,, | Performed by: PHYSICIAN ASSISTANT

## 2023-01-24 PROCEDURE — 99999 PR PBB SHADOW E&M-EST. PATIENT-LVL IV: CPT | Mod: PBBFAC,,, | Performed by: PHYSICIAN ASSISTANT

## 2023-01-24 PROCEDURE — 3079F PR MOST RECENT DIASTOLIC BLOOD PRESSURE 80-89 MM HG: ICD-10-PCS | Mod: CPTII,S$GLB,, | Performed by: PHYSICIAN ASSISTANT

## 2023-01-24 PROCEDURE — 1159F PR MEDICATION LIST DOCUMENTED IN MEDICAL RECORD: ICD-10-PCS | Mod: CPTII,S$GLB,, | Performed by: PHYSICIAN ASSISTANT

## 2023-01-24 PROCEDURE — 3075F SYST BP GE 130 - 139MM HG: CPT | Mod: CPTII,S$GLB,, | Performed by: PHYSICIAN ASSISTANT

## 2023-01-24 PROCEDURE — 99024 POSTOP FOLLOW-UP VISIT: CPT | Mod: S$GLB,,, | Performed by: PHYSICIAN ASSISTANT

## 2023-01-24 PROCEDURE — 3079F DIAST BP 80-89 MM HG: CPT | Mod: CPTII,S$GLB,, | Performed by: PHYSICIAN ASSISTANT

## 2023-01-24 PROCEDURE — 3008F PR BODY MASS INDEX (BMI) DOCUMENTED: ICD-10-PCS | Mod: CPTII,S$GLB,, | Performed by: PHYSICIAN ASSISTANT

## 2023-01-24 PROCEDURE — 3008F BODY MASS INDEX DOCD: CPT | Mod: CPTII,S$GLB,, | Performed by: PHYSICIAN ASSISTANT

## 2023-01-24 PROCEDURE — 1159F MED LIST DOCD IN RCRD: CPT | Mod: CPTII,S$GLB,, | Performed by: PHYSICIAN ASSISTANT

## 2023-01-24 PROCEDURE — 4010F ACE/ARB THERAPY RXD/TAKEN: CPT | Mod: CPTII,S$GLB,, | Performed by: PHYSICIAN ASSISTANT

## 2023-01-27 ENCOUNTER — PATIENT MESSAGE (OUTPATIENT)
Dept: SURGERY | Facility: CLINIC | Age: 63
End: 2023-01-27
Payer: COMMERCIAL

## 2023-02-06 NOTE — PROGRESS NOTES
Subjective:        PATIENT: Eusebia Miramontes  MRN: [id number]  DATE: 2023  Chief Complaint: Follow-up (New patient)    Current Therapy:  Patient   Diagnosis atypical ductal hyperplasia      Imagin2022 SCR MG at [facility] - There is a 0.5 cm oval mass with an indistinct margin in the left breast at 6:00 posterior depth. BI-RADS 0: Incomplete: Left diagnostic mammography with tomosynthesis (to include spot compression views), followed by targeted left breast ultrasound if needed.     2022 L DG MG at [facility] - In the 5:00 left breast posterior depth, there is a 0.6 cm oval mass with circumscribed margins, which corresponds to the mass seen in the 6:00 left breast posterior depth on recent screening mammogram. Targeted US in the 5:00 left breast 6 cm from the nipple reveals two adjacent parallel oval avascular heterogeneous masses with circumscribed margins, which are located approximately 1 cm apart. The larger of the two masses, measuring 0.6 cm x 0.3 cm x 0.5 cm, correlates with the oval mass in the 5:00 left breast posterior depth on mammogram.  The second mass measures 0.5 cm x 0.2 cm x 0.4 cm. BIRADS 4: Left breast 5:00 6 cm from the nipple two adjacent oval circumscribed heterogeneous masses are suspicious for malignancy. Ultrasound guided core needle biopsy of the larger of the two masses in the 5:00 left breast 6 cm from the nipple is recommended.     Pathology:   2022 - Left Breast 5:00 6 cmfn - Complex sclerosing lesion with moderate duct epithelial hyperplasia, usual type.    Please note that radiology recommends that this pathology is also applied to the additional oval, heterogeneous mass with circumscribed margins in the 5:00 left breast, 6 cm from the nipple.     2023 Left Breast Excisional Biopsy - residual complex sclerosing lesion with focal atypical ductal hyperplasia. The margins are free of atypical proliferation.     OB/GYN History:  Age at Menarche Onset: 15  Menopausal Status:  postmenopausal, at age 48  Hysterectomy/Oophorectomy: no  Hormonal birth control (duration): 20 years  Pregnancy History:   Age at first live birth: 23  Hormone Replacement Therapy: Yes,  reports progesterone (but likely combination HRT), stopped 5-7 years ago     Other:  # of breast biopsies (when and pathology results): 2022 - [facility]  MG breast density: Category B  Prior thoracic RT: none  Genetic testing: none          2023  62-year-old postmenopausal female with a history of parathyroid adenomas elevated PTH, fibromyalgia, questionable lupus previously followed by Dr. Dailey, and also osteopenia osteoporosis with a recent diagnosis of atypical ductal hyperplasia.        Past Medical History:   Diagnosis Date    Breast cyst     left breast    Fibromyalgia     GERD (gastroesophageal reflux disease)     Hyperlipidemia     Hypertension     Systemic lupus erythematosus, organ or system involvement unspecified       .  Past Surgical History:   Procedure Laterality Date    ADENOIDECTOMY      BILATERAL TUBAL LIGATION      EXCISION, MASS, BREAST, USING RADIOLOGICAL MARKER Left 2023    Procedure: EXCISION,MASS,BREAST,USING RADIOLOGICAL MARKER Left;  Surgeon: Carmen Gomez MD;  Location[facility];  Service: General;  Laterality: Left;    PARATHYROIDECTOMY      TONSILLECTOMY        .  Family History   Problem Relation Age of Onset    Dementia Mother     Stomach cancer Maternal Aunt         diagnosed in her late 70s    Leukemia Paternal Uncle       Social History     Socioeconomic History    Marital status: Legally    Tobacco Use    Smoking status: Former     Types: Cigarettes    Smokeless tobacco: Never    Tobacco comments:     Quit smoking in    Substance and Sexual Activity    Alcohol use: Not Currently     Comment: socially    Drug use: Not Currently    Sexual activity: Not Currently      .Review of patient's allergies indicates:  No Known Allergies     Current Outpatient Medications:      ascorbic acid, vitamin C, (VITAMIN C) 1000 MG tablet, Take 1,000 mg by mouth once daily., Disp: , Rfl:     cyanocobalamin (VITAMIN B-12) 100 MCG tablet, Take 100 mcg by mouth once daily., Disp: , Rfl:     levocetirizine (XYZAL) 5 MG tablet, Take 5 mg by mouth every evening., Disp: , Rfl:     loratadine (CLARITIN) 10 mg tablet, Take 10 mg by mouth once daily., Disp: , Rfl:     metaxalone (SKELAXIN) 800 MG tablet, Take 800 mg by mouth 3 (three) times daily., Disp: , Rfl:     montelukast (SINGULAIR) 10 mg tablet, Take 10 mg by mouth every evening., Disp: , Rfl:     olmesartan (BENICAR) 20 MG tablet, Take 20 mg by mouth once daily., Disp: , Rfl:     vitamin D (VITAMIN D3) 1000 units Tab, Take 1,000 Units by mouth once daily., Disp: , Rfl:    Review of Systems   Constitutional:  Negative for appetite change and unexpected weight change.   HENT:  Negative for mouth sores.    Eyes:  Negative for visual disturbance.   Respiratory:  Negative for cough and shortness of breath.    Cardiovascular:  Negative for chest pain.   Gastrointestinal:  Negative for abdominal pain and diarrhea.   Genitourinary:  Negative for frequency.   Musculoskeletal:  Positive for back pain.   Skin:  Negative for rash.   Neurological:  Positive for headaches.   Hematological:  Negative for adenopathy.   Psychiatric/Behavioral:  The patient is nervous/anxious.         Objective:     Vitals:    02/07/23 1355   BP: (!) 139/99   Pulse: 85   Temp: 98.3 °F (36.8 °C)         Physical Exam  Vitals reviewed. Exam conducted with a chaperone present.   Constitutional:       General: She is awake.      Appearance: Normal appearance.   HENT:      Head: Normocephalic and atraumatic.      Right Ear: Tympanic membrane normal.      Left Ear: Tympanic membrane normal.      Mouth/Throat:      Mouth: Mucous membranes are moist.      Pharynx: Oropharynx is clear.   Eyes:      Extraocular Movements: Extraocular movements intact.      Conjunctiva/sclera: Conjunctivae  normal.      Pupils: Pupils are equal, round, and reactive to light.   Cardiovascular:      Rate and Rhythm: Normal rate and regular rhythm.      Pulses: Normal pulses.      Heart sounds: Normal heart sounds.   Pulmonary:      Effort: Pulmonary effort is normal.      Breath sounds: Normal breath sounds and air entry.   Chest:          Comments: Small surgical scar left lower outer quadrant.    Small seroma  Bilateral fibrocystic appearing breasts.    No nipple discharge, no nipple inversion, tender in the left seroma cavity, nipple tenderness bilaterally    Abdominal:      General: Abdomen is flat. Bowel sounds are normal.      Palpations: Abdomen is soft.      Tenderness: There is no abdominal tenderness.   Musculoskeletal:      Cervical back: Normal range of motion.      Right lower leg: No edema.      Left lower leg: No edema.   Lymphadenopathy:      Cervical: No cervical adenopathy.      Upper Body:      Right upper body: No axillary adenopathy.      Left upper body: No axillary adenopathy.      Lower Body: No right inguinal adenopathy. No left inguinal adenopathy.   Skin:     General: Skin is warm and dry.   Neurological:      General: No focal deficit present.      Mental Status: She is alert and oriented to person, place, and time. Mental status is at baseline.   Psychiatric:         Attention and Perception: Attention normal.         Mood and Affect: Mood and affect normal.         Behavior: Behavior is cooperative.       ECOG SCORE    0 - Fully active-able to carry on all pre-disease performance without restriction        .Lab Review:  CBC:   Recent Labs     01/11/23  0929   WBC 4.3*   RBC 4.36   HGB 12.9   HCT 40.2        CMP:   Recent Labs     01/11/23  0929      K 4.0   CO2 24   BUN 13.8   CREATININE 0.81   CALCIUM 9.9      Assessment/Plan:   62-year-old female with atypical ductal hyperplasia     ADH discussion  Atypical ductal hyperplasia (ADH) is not a form of breast cancer, but is a  marker or risk factor for developing breast cancer in the future.    She was counseled regarding risk reduction strategies. Ongoing surveillance with yearly mammography and breast exams is appropriate. Women with AH should stop taking oral contraceptives, avoid hormone replacement therapy, and make appropriate lifestyle and dietary changes     Modifying factors of breast cancer:  ? obesity is associated with an increased risk of breast cancer, .   ?Increased exposure to estrogen,   ?Regular physical exercise appears to provide modest protection against breast cancer.   ?Alcohol consumption is associated with an increased risk of breast cancer with a dose-response relationship. .)  ?The relationship between cigarette smoking and breast cancer is complicated by the interaction of smoking with alcohol and endogenous hormonal influences; however, current smoking is a risk factor.   ?A diet rich in fruits and vegetables, fish, and olive oil may result in a lower risk of breast cancer; however, the influence of dietary fat and red meat is not clear.     Primary prevention with the selective estrogen receptor modulators tamoxifen or raloxifene, or an aromatase inhibitor, may be considered in women with AH for breast cancer risk reduction, although the benefits and risks must be discussed thoroughly.   The side effects of anti-hormonal therapy were discussed.  Including but not limited to: Vasomotor hot flashes, dysuria, arthralgia, hyperlipidemia, osteopenia/osteoporosis. Hand outs given on medications and patient had the opportunity to ask questions.    Given this patient's osteopenia --further risk discussed          Tamoxifen versus aromatase inhibitors        Plan:  She will need a repeat bone density in June of 2023   Continue breast surveillance screening with breast clinic  Return to clinic in 3 weeks with TeleMed visit  If patient elects for preventative anti hormonal therapy would pick tamoxifen given her  risk.  If she continues to have breast tenderness, would recommend she go back to breast surgery for re-evaluation.        Follow up in about 3 weeks (around 2/28/2023) for telemed visit.    Afshin Dee MD

## 2023-02-07 ENCOUNTER — DOCUMENTATION ONLY (OUTPATIENT)
Dept: HEMATOLOGY/ONCOLOGY | Facility: CLINIC | Age: 63
End: 2023-02-07

## 2023-02-07 ENCOUNTER — OFFICE VISIT (OUTPATIENT)
Dept: HEMATOLOGY/ONCOLOGY | Facility: CLINIC | Age: 63
End: 2023-02-07
Payer: MEDICARE

## 2023-02-07 VITALS
WEIGHT: 134.69 LBS | HEIGHT: 63 IN | HEART RATE: 85 BPM | BODY MASS INDEX: 23.86 KG/M2 | OXYGEN SATURATION: 99 % | TEMPERATURE: 98 F | DIASTOLIC BLOOD PRESSURE: 99 MMHG | SYSTOLIC BLOOD PRESSURE: 139 MMHG

## 2023-02-07 DIAGNOSIS — N64.89 COMPLEX SCLEROSING LESION OF LEFT BREAST: ICD-10-CM

## 2023-02-07 DIAGNOSIS — N60.92 ATYPICAL DUCTAL HYPERPLASIA OF LEFT BREAST: ICD-10-CM

## 2023-02-07 PROCEDURE — 3075F SYST BP GE 130 - 139MM HG: CPT | Mod: CPTII,S$GLB,, | Performed by: INTERNAL MEDICINE

## 2023-02-07 PROCEDURE — 1159F PR MEDICATION LIST DOCUMENTED IN MEDICAL RECORD: ICD-10-PCS | Mod: CPTII,S$GLB,, | Performed by: INTERNAL MEDICINE

## 2023-02-07 PROCEDURE — 3008F PR BODY MASS INDEX (BMI) DOCUMENTED: ICD-10-PCS | Mod: CPTII,S$GLB,, | Performed by: INTERNAL MEDICINE

## 2023-02-07 PROCEDURE — 4010F ACE/ARB THERAPY RXD/TAKEN: CPT | Mod: CPTII,S$GLB,, | Performed by: INTERNAL MEDICINE

## 2023-02-07 PROCEDURE — 3008F BODY MASS INDEX DOCD: CPT | Mod: CPTII,S$GLB,, | Performed by: INTERNAL MEDICINE

## 2023-02-07 PROCEDURE — 1160F RVW MEDS BY RX/DR IN RCRD: CPT | Mod: CPTII,S$GLB,, | Performed by: INTERNAL MEDICINE

## 2023-02-07 PROCEDURE — 99204 OFFICE O/P NEW MOD 45 MIN: CPT | Mod: S$GLB,,, | Performed by: INTERNAL MEDICINE

## 2023-02-07 PROCEDURE — 3075F PR MOST RECENT SYSTOLIC BLOOD PRESS GE 130-139MM HG: ICD-10-PCS | Mod: CPTII,S$GLB,, | Performed by: INTERNAL MEDICINE

## 2023-02-07 PROCEDURE — 99999 PR PBB SHADOW E&M-EST. PATIENT-LVL IV: ICD-10-PCS | Mod: PBBFAC,,, | Performed by: INTERNAL MEDICINE

## 2023-02-07 PROCEDURE — 3080F PR MOST RECENT DIASTOLIC BLOOD PRESSURE >= 90 MM HG: ICD-10-PCS | Mod: CPTII,S$GLB,, | Performed by: INTERNAL MEDICINE

## 2023-02-07 PROCEDURE — 3080F DIAST BP >= 90 MM HG: CPT | Mod: CPTII,S$GLB,, | Performed by: INTERNAL MEDICINE

## 2023-02-07 PROCEDURE — 99204 PR OFFICE/OUTPT VISIT, NEW, LEVL IV, 45-59 MIN: ICD-10-PCS | Mod: S$GLB,,, | Performed by: INTERNAL MEDICINE

## 2023-02-07 PROCEDURE — 1160F PR REVIEW ALL MEDS BY PRESCRIBER/CLIN PHARMACIST DOCUMENTED: ICD-10-PCS | Mod: CPTII,S$GLB,, | Performed by: INTERNAL MEDICINE

## 2023-02-07 PROCEDURE — 99999 PR PBB SHADOW E&M-EST. PATIENT-LVL IV: CPT | Mod: PBBFAC,,, | Performed by: INTERNAL MEDICINE

## 2023-02-07 PROCEDURE — 4010F PR ACE/ARB THEARPY RXD/TAKEN: ICD-10-PCS | Mod: CPTII,S$GLB,, | Performed by: INTERNAL MEDICINE

## 2023-02-07 PROCEDURE — 1159F MED LIST DOCD IN RCRD: CPT | Mod: CPTII,S$GLB,, | Performed by: INTERNAL MEDICINE

## 2023-02-07 RX ORDER — OLMESARTAN MEDOXOMIL 20 MG/1
20 TABLET ORAL DAILY
COMMUNITY
End: 2023-03-29 | Stop reason: SDUPTHER

## 2023-02-07 RX ORDER — MONTELUKAST SODIUM 10 MG/1
10 TABLET ORAL NIGHTLY
COMMUNITY

## 2023-02-07 NOTE — NURSING
Miriam and I met with Eusebia following her appointment with Dr. Dee. She was emotional during our interaction and was interested in a referral for therapy. She shared some of her difficulties and the reason for the need for a referral.

## 2023-02-21 ENCOUNTER — OFFICE VISIT (OUTPATIENT)
Dept: URGENT CARE | Facility: CLINIC | Age: 63
End: 2023-02-21
Payer: MEDICARE

## 2023-02-21 VITALS
RESPIRATION RATE: 18 BRPM | SYSTOLIC BLOOD PRESSURE: 110 MMHG | TEMPERATURE: 98 F | HEIGHT: 62 IN | DIASTOLIC BLOOD PRESSURE: 78 MMHG | HEART RATE: 70 BPM | OXYGEN SATURATION: 98 % | WEIGHT: 132.38 LBS | BODY MASS INDEX: 24.36 KG/M2

## 2023-02-21 DIAGNOSIS — N30.01 ACUTE CYSTITIS WITH HEMATURIA: ICD-10-CM

## 2023-02-21 DIAGNOSIS — R07.89 OTHER CHEST PAIN: ICD-10-CM

## 2023-02-21 DIAGNOSIS — R35.0 URINARY FREQUENCY: Primary | ICD-10-CM

## 2023-02-21 LAB
APPEARANCE UR: CLEAR
BACTERIA #/AREA URNS AUTO: NORMAL /HPF
BILIRUB UR QL STRIP.AUTO: NEGATIVE MG/DL
BILIRUB UR QL STRIP: NEGATIVE
COLOR UR AUTO: YELLOW
EKG 12-LEAD: NORMAL
GLUCOSE UR QL STRIP.AUTO: NEGATIVE MG/DL
GLUCOSE UR QL STRIP: NEGATIVE
KETONES UR QL STRIP.AUTO: NEGATIVE MG/DL
KETONES UR QL STRIP: NEGATIVE
LEUKOCYTE ESTERASE UR QL STRIP.AUTO: ABNORMAL UNIT/L
LEUKOCYTE ESTERASE UR QL STRIP: POSITIVE
NITRITE UR QL STRIP.AUTO: NEGATIVE
PH UR STRIP.AUTO: 6.5 [PH]
PH, POC UA: 6
POC BLOOD, URINE: POSITIVE
POC NITRATES, URINE: NEGATIVE
PR INTERVAL: NORMAL
PROT UR QL STRIP.AUTO: NEGATIVE MG/DL
PROT UR QL STRIP: NEGATIVE
PRT AXES: NORMAL
QRS DURATION: NORMAL
QT/QTC: NORMAL
RBC #/AREA URNS AUTO: <5 /HPF
RBC UR QL AUTO: ABNORMAL UNIT/L
SP GR UR STRIP.AUTO: 1.01 (ref 1–1.03)
SP GR UR STRIP: 1 (ref 1–1.03)
SQUAMOUS #/AREA URNS AUTO: <5 /HPF
UROBILINOGEN UR STRIP-ACNC: 0.2 MG/DL
UROBILINOGEN UR STRIP-ACNC: NORMAL (ref 0.1–1.1)
VENTRICULAR RATE: NORMAL
WBC #/AREA URNS AUTO: <5 /HPF

## 2023-02-21 PROCEDURE — 93000 POCT EKG 12-LEAD: ICD-10-PCS | Mod: ,,, | Performed by: NURSE PRACTITIONER

## 2023-02-21 PROCEDURE — 99214 PR OFFICE/OUTPT VISIT, EST, LEVL IV, 30-39 MIN: ICD-10-PCS | Mod: 25,,, | Performed by: NURSE PRACTITIONER

## 2023-02-21 PROCEDURE — 3008F BODY MASS INDEX DOCD: CPT | Mod: CPTII,,, | Performed by: NURSE PRACTITIONER

## 2023-02-21 PROCEDURE — 1159F MED LIST DOCD IN RCRD: CPT | Mod: CPTII,,, | Performed by: NURSE PRACTITIONER

## 2023-02-21 PROCEDURE — 1160F RVW MEDS BY RX/DR IN RCRD: CPT | Mod: CPTII,,, | Performed by: NURSE PRACTITIONER

## 2023-02-21 PROCEDURE — 81003 URINALYSIS AUTO W/O SCOPE: CPT | Mod: QW,,, | Performed by: NURSE PRACTITIONER

## 2023-02-21 PROCEDURE — 1160F PR REVIEW ALL MEDS BY PRESCRIBER/CLIN PHARMACIST DOCUMENTED: ICD-10-PCS | Mod: CPTII,,, | Performed by: NURSE PRACTITIONER

## 2023-02-21 PROCEDURE — 3074F SYST BP LT 130 MM HG: CPT | Mod: CPTII,,, | Performed by: NURSE PRACTITIONER

## 2023-02-21 PROCEDURE — 1159F PR MEDICATION LIST DOCUMENTED IN MEDICAL RECORD: ICD-10-PCS | Mod: CPTII,,, | Performed by: NURSE PRACTITIONER

## 2023-02-21 PROCEDURE — 3078F DIAST BP <80 MM HG: CPT | Mod: CPTII,,, | Performed by: NURSE PRACTITIONER

## 2023-02-21 PROCEDURE — 3008F PR BODY MASS INDEX (BMI) DOCUMENTED: ICD-10-PCS | Mod: CPTII,,, | Performed by: NURSE PRACTITIONER

## 2023-02-21 PROCEDURE — 93000 ELECTROCARDIOGRAM COMPLETE: CPT | Mod: ,,, | Performed by: NURSE PRACTITIONER

## 2023-02-21 PROCEDURE — 81003 POCT URINALYSIS, DIPSTICK, AUTOMATED, W/O SCOPE: ICD-10-PCS | Mod: QW,,, | Performed by: NURSE PRACTITIONER

## 2023-02-21 PROCEDURE — 99214 OFFICE O/P EST MOD 30 MIN: CPT | Mod: 25,,, | Performed by: NURSE PRACTITIONER

## 2023-02-21 PROCEDURE — 4010F PR ACE/ARB THEARPY RXD/TAKEN: ICD-10-PCS | Mod: CPTII,,, | Performed by: NURSE PRACTITIONER

## 2023-02-21 PROCEDURE — 3074F PR MOST RECENT SYSTOLIC BLOOD PRESSURE < 130 MM HG: ICD-10-PCS | Mod: CPTII,,, | Performed by: NURSE PRACTITIONER

## 2023-02-21 PROCEDURE — 4010F ACE/ARB THERAPY RXD/TAKEN: CPT | Mod: CPTII,,, | Performed by: NURSE PRACTITIONER

## 2023-02-21 PROCEDURE — 3078F PR MOST RECENT DIASTOLIC BLOOD PRESSURE < 80 MM HG: ICD-10-PCS | Mod: CPTII,,, | Performed by: NURSE PRACTITIONER

## 2023-02-21 RX ORDER — NITROFURANTOIN 25; 75 MG/1; MG/1
100 CAPSULE ORAL 2 TIMES DAILY
Qty: 20 CAPSULE | Refills: 0 | Status: SHIPPED | OUTPATIENT
Start: 2023-02-21 | End: 2023-03-03

## 2023-02-21 RX ORDER — PHENAZOPYRIDINE HYDROCHLORIDE 100 MG/1
100 TABLET, FILM COATED ORAL 3 TIMES DAILY PRN
Qty: 6 TABLET | Refills: 0 | Status: SHIPPED | OUTPATIENT
Start: 2023-02-21 | End: 2023-02-23

## 2023-02-21 NOTE — PATIENT INSTRUCTIONS
UTI  Maintain adequate hydration.   Take Macrobid and prescription Pyridium as directed   Monitor symptoms closely.    Watch out for worsening urinary symptoms, blood in the urine, flank pain, fever, chills, myalgias, and vomiting.    Seek further medical attention immediately at the 1st sign.    Follow-up with your PCP within 72 hours.    We will call you with urine culture results within the next 24-48 hours.     Chest pain  Go to ER now for chest pain and intermittent periods of shortness of breath as instructed

## 2023-02-21 NOTE — PROGRESS NOTES
"Subjective:       Patient ID: Eusebia Miramontes is a 62 y.o. female.    Vitals:  height is 5' 1.81" (1.57 m) and weight is 60.1 kg (132 lb 6.4 oz). Her oral temperature is 97.7 °F (36.5 °C). Her blood pressure is 110/78 and her pulse is 70. Her respiration is 18 and oxygen saturation is 98%.     Chief Complaint: Urinary Frequency (Urinary frequency, lower abdominal pain with pressure, x one week. Pt also states she is sob,chest pain, and right arm pain.)    62-year-old female presents with urinary frequency, urgency, and dysuria.  Mild suprapubic discomfort without flank pain or fever.  Onset several days ago.  No recent injury or trauma.  Also intermittent chest wall discomfort worse on the right with shortness of breath which is also intermittent.   Onset 2 days ago worsened this morning.  No Cough or sore throat    Cardiovascular:  Positive for chest pain (right sided). Negative for chest trauma, leg swelling, palpitations, sob on exertion (no SOB now) and passing out.   Genitourinary:  Positive for dysuria, frequency, urgency, urine decreased and hematuria. Negative for flank pain.     Objective:      Physical Exam   Constitutional: She is oriented to person, place, and time. She appears well-developed. She is cooperative.  Non-toxic appearance. She does not appear ill. No distress.   HENT:   Head: Normocephalic and atraumatic.   Ears:   Right Ear: Hearing, tympanic membrane, external ear and ear canal normal.   Left Ear: Hearing, tympanic membrane, external ear and ear canal normal.   Nose: Nose normal. No mucosal edema, rhinorrhea or nasal deformity. No epistaxis. Right sinus exhibits no maxillary sinus tenderness and no frontal sinus tenderness. Left sinus exhibits no maxillary sinus tenderness and no frontal sinus tenderness.   Mouth/Throat: Uvula is midline, oropharynx is clear and moist and mucous membranes are normal. No trismus in the jaw. Normal dentition. No uvula swelling. No posterior oropharyngeal " erythema.   Eyes: Conjunctivae and lids are normal. Right eye exhibits no discharge. Left eye exhibits no discharge. No scleral icterus.   Neck: Trachea normal and phonation normal. Neck supple.   Cardiovascular: Normal rate, regular rhythm, normal heart sounds and normal pulses.   Pulmonary/Chest: Effort normal and breath sounds normal. No respiratory distress.   Abdominal: Normal appearance and bowel sounds are normal. She exhibits no distension and no mass. Soft. flat abdomen There is no abdominal tenderness. There is no rebound, no guarding, no left CVA tenderness and no right CVA tenderness. No hernia.   Musculoskeletal: Normal range of motion.         General: No deformity. Normal range of motion.   Neurological: She is alert and oriented to person, place, and time. She exhibits normal muscle tone. Coordination normal.   Skin: Skin is warm, dry, intact, not diaphoretic and not pale.   Psychiatric: Her speech is normal and behavior is normal. Judgment and thought content normal.   Nursing note and vitals reviewed.    EKG sinus rhythm heart rate 67 beats per minute.  Mild right-sided chest wall pain with intermittent periods of shortness of breath.  Patient declines EMS transport, states will go to ER per private vehicle per her request.      Assessment:       1. Urinary frequency    2. Acute cystitis with hematuria    3. Other chest pain          Office Visit on 02/21/2023   Component Date Value Ref Range Status    POC Blood, Urine 02/21/2023 Positive (A)  Negative Final    POC Bilirubin, Urine 02/21/2023 Negative  Negative Final    POC Urobilinogen, Urine 02/21/2023 Normal  0.1 - 1.1 Final    POC Ketones, Urine 02/21/2023 Negative  Negative Final    POC Protein, Urine 02/21/2023 Negative  Negative Final    POC Nitrates, Urine 02/21/2023 Negative  Negative Final    POC Glucose, Urine 02/21/2023 Negative  Negative Final    pH, UA 02/21/2023 6   Final    6    POC Specific Gravity, Urine 02/21/2023 1.005  1.003 -  1.029 Final    1.005    POC Leukocytes, Urine 02/21/2023 Positive (A)  Negative Final        Plan:     UTI  Maintain adequate hydration.   Take Macrobid and prescription Pyridium as directed   Monitor symptoms closely.    Watch out for worsening urinary symptoms, blood in the urine, flank pain, fever, chills, myalgias, and vomiting.    Seek further medical attention immediately at the 1st sign.    Follow-up with your PCP within 72 hours.    We will call you with urine culture results within the next 24-48 hours.     Chest pain  Go to ER now for chest pain and intermittent periods of shortness of breath as instructed    Urinary frequency  -     POCT Urinalysis, Dipstick, Automated, W/O Scope    Acute cystitis with hematuria  -     nitrofurantoin, macrocrystal-monohydrate, (MACROBID) 100 MG capsule; Take 1 capsule (100 mg total) by mouth 2 (two) times daily. for 10 days  Dispense: 20 capsule; Refill: 0  -     phenazopyridine (PYRIDIUM) 100 MG tablet; Take 1 tablet (100 mg total) by mouth 3 (three) times daily as needed for Pain.  Dispense: 6 tablet; Refill: 0  -     Urine culture    Other chest pain  -     IN OFFICE EKG 12-LEAD (to [place])

## 2023-02-24 NOTE — PROGRESS NOTES
"Subjective:        PATIENT: Eusebia Miramontes  MRN: [id number]  DATE: 2023  Chief Complaint: No chief complaint on file.  " I have to much information"    Current Therapy:  Patient   Diagnosis atypical ductal hyperplasia      Imagin2022 SCR MG at [facility] - There is a 0.5 cm oval mass with an indistinct margin in the left breast at 6:00 posterior depth. BI-RADS 0: Incomplete: Left diagnostic mammography with tomosynthesis (to include spot compression views), followed by targeted left breast ultrasound if needed.     2022 L DG MG at [facility] - In the 5:00 left breast posterior depth, there is a 0.6 cm oval mass with circumscribed margins, which corresponds to the mass seen in the 6:00 left breast posterior depth on recent screening mammogram. Targeted US in the 5:00 left breast 6 cm from the nipple reveals two adjacent parallel oval avascular heterogeneous masses with circumscribed margins, which are located approximately 1 cm apart. The larger of the two masses, measuring 0.6 cm x 0.3 cm x 0.5 cm, correlates with the oval mass in the 5:00 left breast posterior depth on mammogram.  The second mass measures 0.5 cm x 0.2 cm x 0.4 cm. BIRADS 4: Left breast 5:00 6 cm from the nipple two adjacent oval circumscribed heterogeneous masses are suspicious for malignancy. Ultrasound guided core needle biopsy of the larger of the two masses in the 5:00 left breast 6 cm from the nipple is recommended.     Pathology:   2022 - Left Breast 5:00 6 cmfn - Complex sclerosing lesion with moderate duct epithelial hyperplasia, usual type.    Please note that radiology recommends that this pathology is also applied to the additional oval, heterogeneous mass with circumscribed margins in the 5:00 left breast, 6 cm from the nipple.     2023 Left Breast Excisional Biopsy - residual complex sclerosing lesion with focal atypical ductal hyperplasia. The margins are free of atypical proliferation.     OB/GYN History:  Age at " "Menarche Onset: 15  Menopausal Status: postmenopausal, at age 48  Hysterectomy/Oophorectomy: no  Hormonal birth control (duration): 20 years  Pregnancy History:   Age at first live birth: 23  Hormone Replacement Therapy: Yes,  reports progesterone (but likely combination HRT), stopped 5-7 years ago     Other:  # of breast biopsies (when and pathology results): 2022 - [facility]  MG breast density: Category B  Prior thoracic RT: none  Genetic testing: none    2023  3 week telemed follow up.    Patient had a long discussion with me today regarding her risk and benefits of preventative therapy with her diagnosis of ADH.  She was seen by her gynecologist her gynecologist offered her some sort of genetic testing.  I can not comment on what genetic testing she is offering her.    We can certainly rediscuss those genetic test if she has them done.  She does not have a family history that is strongly predictive of breast cancer.        She does not want to "gain weight, get hot flashes increased cataract risk, increase heart attack risk or stroke or hyperlipidemia or have more frequent urinary tract infections vaginal dryness with the side effects of tamoxifen and/or aromatase inhibitors"    We discussed preventative medications risk and benefits, we have elected to not go on a preventative therapy.  We encouraged exercise weight loss discussed continued compliance with her gyn, primary care physician, and the breast clinic.    We discussed the importance of self monthly breast examinations and compliance with her imaging.    Her questions were answered,    Past Medical History:   Diagnosis Date    Breast cyst     left breast    Fibromyalgia     GERD (gastroesophageal reflux disease)     Hyperlipidemia     Hypertension     Systemic lupus erythematosus, organ or system involvement unspecified       .  Past Surgical History:   Procedure Laterality Date    ADENOIDECTOMY      BILATERAL TUBAL LIGATION      BREAST SURGERY " Left     EXCISION, MASS, BREAST, USING RADIOLOGICAL MARKER Left 01/13/2023    Procedure: EXCISION,MASS,BREAST,USING RADIOLOGICAL MARKER Left;  Surgeon: Carmen Gomez MD;  Location: [facility];  Service: General;  Laterality: Left;    PARATHYROIDECTOMY      TONSILLECTOMY        .  Family History   Problem Relation Age of Onset    Dementia Mother     Stomach cancer Maternal Aunt         diagnosed in her late 70s    Leukemia Paternal Uncle       Social History     Socioeconomic History    Marital status: Legally    Tobacco Use    Smoking status: Former     Packs/day: 0.25     Types: Cigarettes    Smokeless tobacco: Never    Tobacco comments:     Quit smoking in 1991   Substance and Sexual Activity    Alcohol use: Not Currently     Comment: socially    Drug use: Not Currently    Sexual activity: Not Currently      .Review of patient's allergies indicates:  No Known Allergies     Current Outpatient Medications:     ascorbic acid, vitamin C, (VITAMIN C) 1000 MG tablet, Take 1,000 mg by mouth once daily., Disp: , Rfl:     cyanocobalamin (VITAMIN B-12) 100 MCG tablet, Take 100 mcg by mouth once daily., Disp: , Rfl:     levocetirizine (XYZAL) 5 MG tablet, Take 5 mg by mouth every evening., Disp: , Rfl:     loratadine (CLARITIN) 10 mg tablet, Take 10 mg by mouth once daily., Disp: , Rfl:     metaxalone (SKELAXIN) 800 MG tablet, Take 800 mg by mouth once daily., Disp: , Rfl:     montelukast (SINGULAIR) 10 mg tablet, Take 10 mg by mouth every evening., Disp: , Rfl:     nitrofurantoin, macrocrystal-monohydrate, (MACROBID) 100 MG capsule, Take 1 capsule (100 mg total) by mouth 2 (two) times daily. for 10 days, Disp: 20 capsule, Rfl: 0    olmesartan (BENICAR) 20 MG tablet, Take 20 mg by mouth once daily., Disp: , Rfl:     vitamin D (VITAMIN D3) 1000 units Tab, Take 1,000 Units by mouth once daily., Disp: , Rfl:    Review of Systems   Constitutional:  Negative for appetite change and unexpected weight change.   HENT:   Negative for mouth sores.    Eyes:  Negative for visual disturbance.   Respiratory:  Negative for cough and shortness of breath.    Cardiovascular:  Negative for chest pain.   Gastrointestinal:  Negative for abdominal pain and diarrhea.   Genitourinary:  Negative for frequency.   Musculoskeletal:  Positive for back pain.   Skin:  Negative for rash.   Neurological:  Positive for headaches.   Hematological:  Negative for adenopathy.   Psychiatric/Behavioral:  The patient is nervous/anxious.         Objective:     There were no vitals filed for this visit.   Physical Exam  Constitutional:       Appearance: Normal appearance.   Pulmonary:      Effort: Pulmonary effort is normal.   Neurological:      General: No focal deficit present.      Mental Status: She is alert.   Psychiatric:         Mood and Affect: Mood normal.         Behavior: Behavior normal.         Thought Content: Thought content normal.         Judgment: Judgment normal.           ECOG SCORE            .Lab Review:  CBC:   No results for input(s): WBC, RBC, HGB, HCT, PLT, GRAN in the last 760 hours.    CMP:   No results for input(s): NA, K, CL, CO2, GLU, BUN, CREATININE, CALCIUM, PROT, ALBUMIN, BILITOT, ALKPHOS, AST, ALT, EGFRNONAA in the last 760 hours.    Invalid input(s): ESTGFAFR[place]     Assessment/Plan:   62-year-old female with atypical ductal hyperplasia     ADH discussion  Atypical ductal hyperplasia (ADH) is not a form of breast cancer, but is a marker or risk factor for developing breast cancer in the future.    She was counseled regarding risk reduction strategies. Ongoing surveillance with yearly mammography and breast exams is appropriate. Women with AH should stop taking oral contraceptives, avoid hormone replacement therapy, and make appropriate lifestyle and dietary changes     Modifying factors of breast cancer:  ? obesity is associated with an increased risk of breast cancer, .   ?Increased exposure to estrogen,   ?Regular physical  exercise appears to provide modest protection against breast cancer.   ?Alcohol consumption is associated with an increased risk of breast cancer with a dose-response relationship. .)  ?The relationship between cigarette smoking and breast cancer is complicated by the interaction of smoking with alcohol and endogenous hormonal influences; however, current smoking is a risk factor.   ?A diet rich in fruits and vegetables, fish, and olive oil may result in a lower risk of breast cancer; however, the influence of dietary fat and red meat is not clear.     Primary prevention with the selective estrogen receptor modulators tamoxifen or raloxifene, or an aromatase inhibitor, may be considered in women with AH for breast cancer risk reduction, although the benefits and risks must be discussed thoroughly.   The side effects of anti-hormonal therapy were discussed.  Including but not limited to: Vasomotor hot flashes, dysuria, arthralgia, hyperlipidemia, osteopenia/osteoporosis. Hand outs given on medications and patient had the opportunity to ask questions.    Given this patient's osteopenia --further risk discussed          Tamoxifen versus aromatase inhibitors        If patient elects for preventative anti hormonal therapy would pick tamoxifen given her risk.  If she continues to have breast tenderness, would recommend she go back to breast surgery for re-evaluation.    Given her risk of side effects  she has elected not proceed with preventative therapy    Plan:  She will need a repeat bone density in June of 2023 with primary care or Gyn  Continue breast surveillance screening with breast clinic  Return to clinic Prn  She has elected not to start therapy   Follow-up on a p.r.n. basis    Afshin Dee MD        Follow up for No scheduled follow up. Follow up prn- .    Afshin Dee MD    Telemed: This visit was a telemedicine/.  Real-time audio and video were used following [facility] protocols.  The patient and/or  family agreed to the security of information at the location.  I was located in the [facility] with this visit occurred.  The patient was located.  At home  I am licensed to practice in the [facility].== total time is 30 minutes

## 2023-02-25 LAB — BACTERIA UR CULT: ABNORMAL

## 2023-02-28 ENCOUNTER — OFFICE VISIT (OUTPATIENT)
Dept: HEMATOLOGY/ONCOLOGY | Facility: CLINIC | Age: 63
End: 2023-02-28
Payer: MEDICARE

## 2023-02-28 DIAGNOSIS — N60.92 ATYPICAL DUCTAL HYPERPLASIA OF LEFT BREAST: Primary | ICD-10-CM

## 2023-02-28 PROCEDURE — 1159F PR MEDICATION LIST DOCUMENTED IN MEDICAL RECORD: ICD-10-PCS | Mod: CPTII,95,, | Performed by: INTERNAL MEDICINE

## 2023-02-28 PROCEDURE — 4010F ACE/ARB THERAPY RXD/TAKEN: CPT | Mod: CPTII,95,, | Performed by: INTERNAL MEDICINE

## 2023-02-28 PROCEDURE — 4010F PR ACE/ARB THEARPY RXD/TAKEN: ICD-10-PCS | Mod: CPTII,95,, | Performed by: INTERNAL MEDICINE

## 2023-02-28 PROCEDURE — 1160F RVW MEDS BY RX/DR IN RCRD: CPT | Mod: CPTII,95,, | Performed by: INTERNAL MEDICINE

## 2023-02-28 PROCEDURE — 99214 OFFICE O/P EST MOD 30 MIN: CPT | Mod: 95,,, | Performed by: INTERNAL MEDICINE

## 2023-02-28 PROCEDURE — 99214 PR OFFICE/OUTPT VISIT, EST, LEVL IV, 30-39 MIN: ICD-10-PCS | Mod: 95,,, | Performed by: INTERNAL MEDICINE

## 2023-02-28 PROCEDURE — 1160F PR REVIEW ALL MEDS BY PRESCRIBER/CLIN PHARMACIST DOCUMENTED: ICD-10-PCS | Mod: CPTII,95,, | Performed by: INTERNAL MEDICINE

## 2023-02-28 PROCEDURE — 1159F MED LIST DOCD IN RCRD: CPT | Mod: CPTII,95,, | Performed by: INTERNAL MEDICINE

## 2023-03-29 ENCOUNTER — OFFICE VISIT (OUTPATIENT)
Dept: INTERNAL MEDICINE | Facility: CLINIC | Age: 63
End: 2023-03-29
Payer: MEDICARE

## 2023-03-29 ENCOUNTER — HOSPITAL ENCOUNTER (OUTPATIENT)
Dept: RADIOLOGY | Facility: HOSPITAL | Age: 63
Discharge: HOME | End: 2023-03-29
Attending: INTERNAL MEDICINE
Payer: MEDICARE

## 2023-03-29 VITALS
SYSTOLIC BLOOD PRESSURE: 118 MMHG | HEIGHT: 61 IN | OXYGEN SATURATION: 98 % | BODY MASS INDEX: 24.8 KG/M2 | DIASTOLIC BLOOD PRESSURE: 82 MMHG | WEIGHT: 131.38 LBS | RESPIRATION RATE: 18 BRPM | HEART RATE: 81 BPM

## 2023-03-29 DIAGNOSIS — R30.0 DYSURIA: Primary | ICD-10-CM

## 2023-03-29 DIAGNOSIS — M54.2 NECK PAIN: ICD-10-CM

## 2023-03-29 DIAGNOSIS — R10.9 ABDOMINAL PAIN, UNSPECIFIED ABDOMINAL LOCATION: ICD-10-CM

## 2023-03-29 DIAGNOSIS — K62.5 RECTAL BLEED: ICD-10-CM

## 2023-03-29 DIAGNOSIS — R07.9 CHEST PAIN, UNSPECIFIED TYPE: Primary | ICD-10-CM

## 2023-03-29 DIAGNOSIS — R53.83 FATIGUE, UNSPECIFIED TYPE: ICD-10-CM

## 2023-03-29 DIAGNOSIS — I10 BENIGN ESSENTIAL HYPERTENSION: ICD-10-CM

## 2023-03-29 DIAGNOSIS — R35.0 URINARY FREQUENCY: ICD-10-CM

## 2023-03-29 DIAGNOSIS — E78.5 HYPERLIPIDEMIA, UNSPECIFIED HYPERLIPIDEMIA TYPE: ICD-10-CM

## 2023-03-29 DIAGNOSIS — R07.9 CHEST PAIN, UNSPECIFIED TYPE: ICD-10-CM

## 2023-03-29 PROCEDURE — 3074F PR MOST RECENT SYSTOLIC BLOOD PRESSURE < 130 MM HG: ICD-10-PCS | Mod: CPTII,,, | Performed by: INTERNAL MEDICINE

## 2023-03-29 PROCEDURE — 3079F PR MOST RECENT DIASTOLIC BLOOD PRESSURE 80-89 MM HG: ICD-10-PCS | Mod: CPTII,,, | Performed by: INTERNAL MEDICINE

## 2023-03-29 PROCEDURE — 4010F PR ACE/ARB THEARPY RXD/TAKEN: ICD-10-PCS | Mod: CPTII,,, | Performed by: INTERNAL MEDICINE

## 2023-03-29 PROCEDURE — 1159F PR MEDICATION LIST DOCUMENTED IN MEDICAL RECORD: ICD-10-PCS | Mod: CPTII,,, | Performed by: INTERNAL MEDICINE

## 2023-03-29 PROCEDURE — 3008F BODY MASS INDEX DOCD: CPT | Mod: CPTII,,, | Performed by: INTERNAL MEDICINE

## 2023-03-29 PROCEDURE — 3079F DIAST BP 80-89 MM HG: CPT | Mod: CPTII,,, | Performed by: INTERNAL MEDICINE

## 2023-03-29 PROCEDURE — 99214 PR OFFICE/OUTPT VISIT, EST, LEVL IV, 30-39 MIN: ICD-10-PCS | Mod: ,,, | Performed by: INTERNAL MEDICINE

## 2023-03-29 PROCEDURE — 4010F ACE/ARB THERAPY RXD/TAKEN: CPT | Mod: CPTII,,, | Performed by: INTERNAL MEDICINE

## 2023-03-29 PROCEDURE — 99214 OFFICE O/P EST MOD 30 MIN: CPT | Mod: ,,, | Performed by: INTERNAL MEDICINE

## 2023-03-29 PROCEDURE — 71046 X-RAY EXAM CHEST 2 VIEWS: CPT | Mod: TC

## 2023-03-29 PROCEDURE — 3008F PR BODY MASS INDEX (BMI) DOCUMENTED: ICD-10-PCS | Mod: CPTII,,, | Performed by: INTERNAL MEDICINE

## 2023-03-29 PROCEDURE — 1159F MED LIST DOCD IN RCRD: CPT | Mod: CPTII,,, | Performed by: INTERNAL MEDICINE

## 2023-03-29 PROCEDURE — 3074F SYST BP LT 130 MM HG: CPT | Mod: CPTII,,, | Performed by: INTERNAL MEDICINE

## 2023-03-29 RX ORDER — METAXALONE 800 MG/1
800 TABLET ORAL DAILY
Qty: 30 TABLET | Refills: 11 | Status: SHIPPED | OUTPATIENT
Start: 2023-03-29

## 2023-03-29 RX ORDER — DULOXETIN HYDROCHLORIDE 30 MG/1
30 CAPSULE, DELAYED RELEASE ORAL DAILY
Qty: 30 CAPSULE | Refills: 11 | Status: SHIPPED | OUTPATIENT
Start: 2023-03-29 | End: 2023-03-29

## 2023-03-29 RX ORDER — OLMESARTAN MEDOXOMIL 20 MG/1
20 TABLET ORAL DAILY
Qty: 30 TABLET | Refills: 11 | Status: SHIPPED | OUTPATIENT
Start: 2023-03-29 | End: 2023-03-29

## 2023-03-29 RX ORDER — DULOXETIN HYDROCHLORIDE 30 MG/1
30 CAPSULE, DELAYED RELEASE ORAL DAILY
Qty: 30 CAPSULE | Refills: 11 | Status: SHIPPED | OUTPATIENT
Start: 2023-03-29 | End: 2023-08-15 | Stop reason: SDUPTHER

## 2023-03-29 RX ORDER — OLMESARTAN MEDOXOMIL 20 MG/1
20 TABLET ORAL DAILY
Qty: 30 TABLET | Refills: 11 | Status: SHIPPED | OUTPATIENT
Start: 2023-03-29

## 2023-03-29 RX ORDER — METAXALONE 800 MG/1
800 TABLET ORAL DAILY
Qty: 30 TABLET | Refills: 11 | Status: SHIPPED | OUTPATIENT
Start: 2023-03-29 | End: 2023-03-29

## 2023-03-29 NOTE — PROGRESS NOTES
"Subjective:       Patient ID: Eusebia Miramontes is a 62 y.o. female.    Chief Complaint: Cystitis (Bladder pressure, frequent urination, pain)      The patient is a 62-year-old woman in for follow-up with complaints urinary frequency and lower abdominal pressure.  She has generalized fatigue.  She "does not feel well.      Since I saw her last she did have a mammogram which led to a biopsy and eventual surgery.  The tissue was benign but she is at increased risk for breast cancer.  She has seen the medical oncologist and treatment with tamoxifen her other agents is being considered for reduction of breast cancer risk.      Since the surgery was done she is had persistent pain in the left breast.  Over the past week or so she is had sharp pain in the left anterior chest radiating straight to the back that is pleuritic in nature.  No fever chills and no cough.  She has had some nasal congestion.    She also tells me of numerous problems she is having at home with an apparent divorce and potentially losing her home.  She tells me her daughter being  and she has not been anxiety to the wedding.    Requesting refills on meds including Cymbalta and Skelaxin.  The doses relatively low and she had been on this combination for years from her rheumatologist.      Review of Systems     Current Outpatient Medications on File Prior to Visit   Medication Sig Dispense Refill    cyanocobalamin (VITAMIN B-12) 100 MCG tablet Take 100 mcg by mouth once daily.      levocetirizine (XYZAL) 5 MG tablet Take 5 mg by mouth every evening.      loratadine (CLARITIN) 10 mg tablet Take 10 mg by mouth once daily.      montelukast (SINGULAIR) 10 mg tablet Take 10 mg by mouth every evening.      vitamin D (VITAMIN D3) 1000 units Tab Take 1,000 Units by mouth once daily.      [DISCONTINUED] metaxalone (SKELAXIN) 800 MG tablet Take 800 mg by mouth once daily.      [DISCONTINUED] olmesartan (BENICAR) 20 MG tablet Take 20 mg by mouth once daily.   " "    No current facility-administered medications on file prior to visit.     Objective:      /82 (BP Location: Right arm, Patient Position: Sitting, BP Method: Large (Manual))   Pulse 81   Resp 18   Ht 5' 1" (1.549 m)   Wt 59.6 kg (131 lb 6.4 oz)   SpO2 98%   BMI 24.83 kg/m²     Physical Exam  Vitals reviewed.   Constitutional:       Appearance: Normal appearance.   HENT:      Head: Normocephalic.      Nose: Nose normal.      Mouth/Throat:      Pharynx: Oropharynx is clear.   Eyes:      Pupils: Pupils are equal, round, and reactive to light.   Neck:      Thyroid: No thyromegaly.   Cardiovascular:      Rate and Rhythm: Normal rate and regular rhythm.      Pulses: Normal pulses.   Abdominal:      General: Abdomen is flat. Bowel sounds are normal.      Palpations: Abdomen is soft. There is no hepatomegaly, splenomegaly or mass.      Tenderness: There is no abdominal tenderness.   Musculoskeletal:      Cervical back: Neck supple.   Lymphadenopathy:      Cervical: No cervical adenopathy.   Skin:     General: Skin is warm and dry.   Neurological:      Mental Status: She is alert.       Assessment:        1. Atypical chest pain.  Somewhat pleuritic.  Consider pulmonary embolus.  Consider lung disease     2. Complaints of rectal bleeding.  Small volume bright red blood a couple of weeks ago.  She did have a colonoscopy a few years ago with Dr. Franck chun    3. Urinary frequency and lower abdominal pressure.  Rule out UTI    4. Generalized fatigue.  Multifactorial    5. Stable hypertension    6. History of hyperlipidemia    7. History of osteopenia bordering on osteoporosis.  However she did have hyperparathyroidism and hopefully her bone density has improved.  Her next study is due in about 3 months.     8. By virtue of recent breast surgery increased risk for breast cancer.  Anti hormonal therapy being considered by her oncologist.      Will check a UA and culture if needed along with CBC CMP TSH and D-dimer.  " We will get a plain chest x-ray two-view.  Refilled Cymbalta Skelaxin and blood pressure med.  Follow-up with me as scheduled to be scheduled.    Plan:

## 2023-03-30 DIAGNOSIS — R10.9 ABDOMINAL PAIN, UNSPECIFIED ABDOMINAL LOCATION: Primary | ICD-10-CM

## 2023-04-20 ENCOUNTER — TELEPHONE (OUTPATIENT)
Dept: INTERNAL MEDICINE | Facility: CLINIC | Age: 63
End: 2023-04-20
Payer: MEDICARE

## 2023-04-20 DIAGNOSIS — Z12.31 VISIT FOR SCREENING MAMMOGRAM: Primary | ICD-10-CM

## 2023-04-20 NOTE — TELEPHONE ENCOUNTER
----- Message from Brynn Bowman sent at 4/20/2023  9:39 AM CDT -----  Good morning, the patient is stating she wants to complete her CT AP with contrast at the [facility] facility in [place]. The orders for this test will need to be sent to this facility for this patient to complete the test in [place]. Thank you

## 2023-05-01 DIAGNOSIS — Z78.0 POST-MENOPAUSE: Primary | ICD-10-CM

## 2023-06-01 ENCOUNTER — DOCUMENTATION ONLY (OUTPATIENT)
Dept: INTERNAL MEDICINE | Facility: CLINIC | Age: 63
End: 2023-06-01
Payer: MEDICARE

## 2023-06-01 ENCOUNTER — OFFICE VISIT (OUTPATIENT)
Dept: SURGERY | Facility: CLINIC | Age: 63
End: 2023-06-01
Payer: MEDICARE

## 2023-06-01 VITALS
DIASTOLIC BLOOD PRESSURE: 80 MMHG | RESPIRATION RATE: 18 BRPM | HEIGHT: 61 IN | TEMPERATURE: 98 F | HEART RATE: 69 BPM | WEIGHT: 134.63 LBS | OXYGEN SATURATION: 100 % | SYSTOLIC BLOOD PRESSURE: 112 MMHG | BODY MASS INDEX: 25.42 KG/M2

## 2023-06-01 DIAGNOSIS — N60.92 ATYPICAL DUCTAL HYPERPLASIA OF LEFT BREAST: ICD-10-CM

## 2023-06-01 DIAGNOSIS — N64.4 MASTALGIA: Primary | ICD-10-CM

## 2023-06-01 DIAGNOSIS — N64.89 HEMATOMA OF LEFT BREAST: ICD-10-CM

## 2023-06-01 DIAGNOSIS — N64.89 COMPLEX SCLEROSING LESION OF LEFT BREAST: ICD-10-CM

## 2023-06-01 PROCEDURE — 1160F PR REVIEW ALL MEDS BY PRESCRIBER/CLIN PHARMACIST DOCUMENTED: ICD-10-PCS | Mod: CPTII,S$GLB,, | Performed by: PHYSICIAN ASSISTANT

## 2023-06-01 PROCEDURE — 3008F PR BODY MASS INDEX (BMI) DOCUMENTED: ICD-10-PCS | Mod: CPTII,S$GLB,, | Performed by: PHYSICIAN ASSISTANT

## 2023-06-01 PROCEDURE — 4010F PR ACE/ARB THEARPY RXD/TAKEN: ICD-10-PCS | Mod: CPTII,S$GLB,, | Performed by: PHYSICIAN ASSISTANT

## 2023-06-01 PROCEDURE — 3074F SYST BP LT 130 MM HG: CPT | Mod: CPTII,S$GLB,, | Performed by: PHYSICIAN ASSISTANT

## 2023-06-01 PROCEDURE — 99214 PR OFFICE/OUTPT VISIT, EST, LEVL IV, 30-39 MIN: ICD-10-PCS | Mod: S$GLB,,, | Performed by: PHYSICIAN ASSISTANT

## 2023-06-01 PROCEDURE — 1159F MED LIST DOCD IN RCRD: CPT | Mod: CPTII,S$GLB,, | Performed by: PHYSICIAN ASSISTANT

## 2023-06-01 PROCEDURE — 3074F PR MOST RECENT SYSTOLIC BLOOD PRESSURE < 130 MM HG: ICD-10-PCS | Mod: CPTII,S$GLB,, | Performed by: PHYSICIAN ASSISTANT

## 2023-06-01 PROCEDURE — 1159F PR MEDICATION LIST DOCUMENTED IN MEDICAL RECORD: ICD-10-PCS | Mod: CPTII,S$GLB,, | Performed by: PHYSICIAN ASSISTANT

## 2023-06-01 PROCEDURE — 3008F BODY MASS INDEX DOCD: CPT | Mod: CPTII,S$GLB,, | Performed by: PHYSICIAN ASSISTANT

## 2023-06-01 PROCEDURE — 99999 PR PBB SHADOW E&M-EST. PATIENT-LVL V: CPT | Mod: PBBFAC,,, | Performed by: PHYSICIAN ASSISTANT

## 2023-06-01 PROCEDURE — 3079F DIAST BP 80-89 MM HG: CPT | Mod: CPTII,S$GLB,, | Performed by: PHYSICIAN ASSISTANT

## 2023-06-01 PROCEDURE — 4010F ACE/ARB THERAPY RXD/TAKEN: CPT | Mod: CPTII,S$GLB,, | Performed by: PHYSICIAN ASSISTANT

## 2023-06-01 PROCEDURE — 3079F PR MOST RECENT DIASTOLIC BLOOD PRESSURE 80-89 MM HG: ICD-10-PCS | Mod: CPTII,S$GLB,, | Performed by: PHYSICIAN ASSISTANT

## 2023-06-01 PROCEDURE — 99999 PR PBB SHADOW E&M-EST. PATIENT-LVL V: ICD-10-PCS | Mod: PBBFAC,,, | Performed by: PHYSICIAN ASSISTANT

## 2023-06-01 PROCEDURE — 99214 OFFICE O/P EST MOD 30 MIN: CPT | Mod: S$GLB,,, | Performed by: PHYSICIAN ASSISTANT

## 2023-06-01 PROCEDURE — 1160F RVW MEDS BY RX/DR IN RCRD: CPT | Mod: CPTII,S$GLB,, | Performed by: PHYSICIAN ASSISTANT

## 2023-06-01 RX ORDER — CLORAZEPATE DIPOTASSIUM 15 MG/1
15 TABLET ORAL NIGHTLY PRN
COMMUNITY
Start: 2023-05-22 | End: 2023-08-15

## 2023-06-01 RX ORDER — FLUTICASONE PROPIONATE 50 MCG
1 SPRAY, SUSPENSION (ML) NASAL 2 TIMES DAILY
COMMUNITY
Start: 2023-05-19

## 2023-06-01 RX ORDER — PANTOPRAZOLE SODIUM 40 MG/1
40 TABLET, DELAYED RELEASE ORAL 2 TIMES DAILY
COMMUNITY
Start: 2023-04-26

## 2023-06-01 NOTE — PROGRESS NOTES
[facility] - Breast [place] [facility]  Breast Surgical Oncology  Follow Up Patient Office Visit - H&P      Referring Provider: No ref. provider found  PCP: Leobardo Molina MD     Chief Complaint:   Chief Complaint   Patient presents with    Follow-up     Follow up for Breast Pain and swelling; patient stated that the pain and swelling started 2 weeks ago.      Subjective:     Treatments:  2023 - Left Breast Excisional Biopsy targeting two adjacent masses in the 5:00 position 6 cmfn    Interval History:  2023 - Eusebia Miramontes returns today for evaluation of swelling and pain at her surgery site x's 2 weeks. The pain gradually worsens throughout the day and is the worst at night. She reports that she has had no changes in her activities recently. She avoids underwire bras. She does drink caffeine daily and alcohol a few times per week.  Of note, patient met with oncology (Dr. Dee) and decided against chemoprevention. However, she is having a bone density soon and is considering Evista if she has a low BMD.    HPI:  Eusebia Miramontes is a 62 y.o. female who presents on 2022 for evaluation of  left breast complex sclerosing lesions . She is s/p left breast excisional biopsy on 2023. Surgical pathology revealed residual complex sclerosing lesion with focal atypical ductal hyperplasia. Her lifetime risk for breast cancer was re-calculated to include ADH and is found to be elevated according to the Tyrer Cuzick model (34.1% on v7 and 28.1% on v8).    She currently denies any breast issues including rashes, redness, pain, swelling, nipple discharge, or new lumps/masses.    Imagin2022 SCR MG at [facility] - There is a 0.5 cm oval mass with an indistinct margin in the left breast at 6:00 posterior depth. BI-RADS 0: Incomplete: Left diagnostic mammography with tomosynthesis (to include spot compression views), followed by targeted left breast ultrasound if needed.    2022 L DG MG  at [facility] - In the 5:00 left breast posterior depth, there is a 0.6 cm oval mass with circumscribed margins, which corresponds to the mass seen in the 6:00 left breast posterior depth on recent screening mammogram. Targeted US in the 5:00 left breast 6 cm from the nipple reveals two adjacent parallel oval avascular heterogeneous masses with circumscribed margins, which are located approximately 1 cm apart. The larger of the two masses, measuring 0.6 cm x 0.3 cm x 0.5 cm, correlates with the oval mass in the 5:00 left breast posterior depth on mammogram.  The second mass measures 0.5 cm x 0.2 cm x 0.4 cm. BIRADS 4: Left breast 5:00 6 cm from the nipple two adjacent oval circumscribed heterogeneous masses are suspicious for malignancy. Ultrasound guided core needle biopsy of the larger of the two masses in the 5:00 left breast 6 cm from the nipple is recommended.    Pathology:   2022 - Left Breast 5:00 6 cmfn - Complex sclerosing lesion with moderate duct epithelial hyperplasia, usual type.    Please note that radiology recommends that this pathology is also applied to the additional oval, heterogeneous mass with circumscribed margins in the 5:00 left breast, 6 cm from the nipple.    2023 Left Breast Excisional Biopsy - residual complex sclerosing lesion with focal atypical ductal hyperplasia. The margins are free of atypical proliferation.    OB/GYN History:  Age at Menarche Onset: 15  Menopausal Status: postmenopausal, at age 48  Hysterectomy/Oophorectomy: no  Hormonal birth control (duration): 20 years  Pregnancy History:   Age at first live birth: 23  Hormone Replacement Therapy: Yes,  reports progesterone (but likely combination HRT), stopped 5-7 years ago    Other:  # of breast biopsies (when and pathology results): 2022 - [facility]  MG breast density: Category B  Prior thoracic RT: none  Genetic testing: none  Ashkenazi Cheondoism descent: No    Family History:  Family History   Problem Relation Age of  Onset    Dementia Mother     Arthritis Mother         Parkisons and dementia    Stomach cancer Maternal Aunt         diagnosed in her late 70s    Leukemia Paternal Uncle     Arthritis Father         Kidney Failure,Heart problems,COPD,    Birth defects Son         Intestines twisted cause of death also he  had cerebral Palsy        Patient History:  Past Medical History:   Diagnosis Date    Breast cyst     left breast    Fibromyalgia     GERD (gastroesophageal reflux disease)     Hyperlipidemia     Hypertension     Systemic lupus erythematosus, organ or system involvement unspecified        Past Surgical History:   Procedure Laterality Date    ADENOIDECTOMY      BILATERAL TUBAL LIGATION      BREAST SURGERY Left     EXCISION, MASS, BREAST, USING RADIOLOGICAL MARKER Left 2023    Procedure: EXCISION,MASS,BREAST,USING RADIOLOGICAL MARKER Left;  Surgeon: Carmen Gomez MD;  Location: [facility];  Service: General;  Laterality: Left;    PARATHYROIDECTOMY      TONSILLECTOMY      TUBAL LIGATION  3/3/1997       Social History     Socioeconomic History    Marital status: Legally    Tobacco Use    Smoking status: Former     Packs/day: 0.25     Years: 5.00     Pack years: 1.25     Types: Cigarettes     Quit date: 1990     Years since quittin.5    Smokeless tobacco: Never    Tobacco comments:     Only smoked a pack per week only smoked if I was out   Substance and Sexual Activity    Alcohol use: Not Currently     Comment: socially    Drug use: Not Currently     Types: Marijuana    Sexual activity: Not Currently     Partners: Male     Birth control/protection: None       Immunization History   Administered Date(s) Administered    COVID-19, MRNA, LN-S, PF (Pfizer) (Purple Cap) 2021, 2021       Medications/Allergies:    Current Outpatient Medications:     clorazepate (TRANXENE) 15 MG tablet, Take 15 mg by mouth nightly as needed., Disp: , Rfl:     cyanocobalamin (VITAMIN B-12) 100 MCG tablet,  Take 100 mcg by mouth once daily., Disp: , Rfl:     DULoxetine (CYMBALTA) 30 MG capsule, Take 1 capsule (30 mg total) by mouth once daily., Disp: 30 capsule, Rfl: 11    fluticasone propionate (FLONASE) 50 mcg/actuation nasal spray, 1 spray by Each Nostril route 2 (two) times daily., Disp: , Rfl:     levocetirizine (XYZAL) 5 MG tablet, Take 5 mg by mouth every evening., Disp: , Rfl:     loratadine (CLARITIN) 10 mg tablet, Take 10 mg by mouth once daily., Disp: , Rfl:     metaxalone (SKELAXIN) 800 MG tablet, Take 1 tablet (800 mg total) by mouth once daily., Disp: 30 tablet, Rfl: 11    montelukast (SINGULAIR) 10 mg tablet, Take 10 mg by mouth every evening., Disp: , Rfl:     olmesartan (BENICAR) 20 MG tablet, Take 1 tablet (20 mg total) by mouth once daily., Disp: 30 tablet, Rfl: 11    pantoprazole (PROTONIX) 40 MG tablet, Take 40 mg by mouth 2 (two) times daily., Disp: , Rfl:     vitamin D (VITAMIN D3) 1000 units Tab, Take 1,000 Units by mouth once daily., Disp: , Rfl:      Review of patient's allergies indicates:  No Known Allergies    Review of Systems:  Review of Systems   Constitutional:  Positive for malaise/fatigue. Negative for chills, fever and weight loss.        Reports fatigue related to mood   HENT:  Negative for congestion and sore throat.    Eyes:  Negative for blurred vision and double vision.   Respiratory:  Negative for cough, hemoptysis, shortness of breath and wheezing.    Cardiovascular:  Negative for chest pain, palpitations and leg swelling.   Gastrointestinal:  Negative for abdominal pain, constipation, diarrhea, heartburn, nausea and vomiting.   Genitourinary:  Negative for dysuria, frequency and hematuria.   Musculoskeletal:  Positive for back pain. Negative for falls, joint pain and myalgias.        Chronic   Skin:  Negative for itching and rash.   Neurological:  Negative for dizziness, sensory change, focal weakness, seizures and headaches.   Endo/Heme/Allergies:  Negative for environmental  allergies. Does not bruise/bleed easily.   Psychiatric/Behavioral:  Positive for depression. Negative for suicidal ideas. The patient is nervous/anxious.      Objective:     Vitals:  Vitals:    06/01/23 1157   BP: 112/80   Pulse: 69   Resp: 18   Temp: 97.9 °F (36.6 °C)       Body mass index is 25.43 kg/m².     Physical Exam:  General: The patient is awake, alert and oriented times three. The patient is well nourished and in no acute distress.  Neck: There is no evidence of palpable cervical, supraclavicular or axillary adenopathy. The neck is supple. The thyroid is not enlarged.  Musculoskeletal: The patient has a normal range of motion of her bilateral upper extremities.  Chest: Examination of the chest wall fails to reveal any obvious abnormalities. Nonlabored breathing, symmetric expansion.  Breast:  Left: Well healed excisional biopsy scar in the lower outer quadrant. No swelling or palpable fluid collection at surgery site. There is palpable scar tissue present but no dominant mass or area of focal nodularity. The nipple is everted without evidence of discharge. There is no skin dimpling with movement of the pectoralis. There are no significant skin changes overlying the breast.  Abdomen: The abdomen is soft, flat, nontender and nondistended.  Integumentary: no rashes or skin lesions present  Neurologic: cranial nerves intact, no signs of peripheral neurological deficit, motor/sensory function intact    Assessment and Plan:     Patient Active Problem List   Diagnosis    Benign essential hypertension    Fibromyositis    Degeneration of intervertebral disc of cervical region    Hyperlipidemia    Heart valve disease    Osteoarthritis    Positive antinuclear antibody    Adolescent idiopathic scoliosis of thoracolumbar region    History of kidney stones    Atypical ductal hyperplasia of left breast    Acute cystitis with hematuria    Other chest pain      Eusebia was seen today for follow-up.    Diagnoses and all  orders for this visit:    Mastalgia  -     Mammo Digital Diagnostic Left with Davion; Future  -     US Breast Left Limited; Future        06/01/2023 - Eusebia Miramontes returns today for evaluation of swelling and pain at her surgery site x's 2 weeks. The pain gradually worsens throughout the day and is the worst at night. She reports that she has had no changes in her activities recently. She avoids underwire bras. She does drink caffeine daily and alcohol a few times per week.  Of note, patient met with oncology (Dr. Dee) and decided against chemoprevention. However, she is having a bone density soon and is considering Evista if she has a low BMD.       Plan:     There is no palpable swelling or obvious fluid collection at surgery site. There is mild palpable scar tissue present. Patient is noted to have a history of a post op hematoma which resolved spontaneously.    Will order L DG MG and US to evaluate new symptoms.  RTC after imaging for re-evaluation.    I discussed ways to reduce breast pain/tenderness. Avoid caffeine (coffee, teas, chocolate, sodas). Drinking alcohol may also increase the risk of fibrocystic changes and breast pain. I also advised to wear a good, supportive bra both day and night when symptoms are worse. Avoid contact sports and other activities which could cause injury to the breasts. She may take Tylenol when the pain is worse. Massages recommended to for myofascial scar tissue release and to desensitize scar tissue.    She met with Dr. Dee declined chemoprevention with AI or Tamoxifen. She is having a bone density done soon and is considering trying Evista if there osteopenia or osteoporosis is present. Evista is shown to decrease risk of invasive breast cancer in women at high risk although less effective than Tamoxifen.    SCR MG due 11/2023. Tentatively, RTC after MG for high risk follow up.        All of her questions were answered. She was advised to call if she develops any  questions or concerns.    Niyah Saunders PA-C          Total time on the date of the visit ranged from 30-39 mins (99166). Total time includes both face-to-face and non-face-to-face time personally spent by myself on the day of the visit.    Non-face-to-face time included:  _X_ preparing to see the patient such as reviewing the patient record  __ obtaining and reviewing separately obtained history  _X_ independently interpreting results  _X_ documenting clinical information in electronic health record.    Face-to-face time included:  _X_ performing an appropriate history and examination  _X_ communicating results to the patient  _X_ counseling and educating the patient  __ ordering appropriate medications  _x_ ordering appropriate tests  _X_ ordering appropriate procedures (including follow-up)  _X_ answering any questions the patient had    Total Time spent on date of visit: 35 minutes

## 2023-06-05 DIAGNOSIS — N20.0 KIDNEY STONE: Primary | ICD-10-CM

## 2023-06-19 ENCOUNTER — TELEPHONE (OUTPATIENT)
Dept: INTERNAL MEDICINE | Facility: CLINIC | Age: 63
End: 2023-06-19
Payer: MEDICARE

## 2023-06-19 NOTE — TELEPHONE ENCOUNTER
Patient advised, [facility]  Patient requesting rx be sent to [facility].

## 2023-06-19 NOTE — TELEPHONE ENCOUNTER
----- Message from Andreia Jackosn sent at 6/19/2023  2:48 PM CDT -----  Regarding: advice  Type:  Needs Medical Advice    Who Called: pt    Pharmacy name and phone #:  [facility]  Would the patient rather a call back or a response via [facility]? C/b  Best Call Back Number: [phone]  Additional Information: pt was informed pharmacy will not fill her  DULoxetine (CYMBALTA) 30 MG capsule because it is counter indicated with the metaxalone (SKELAXIN) 800 MG tablet  Pt stated she has been taking both since 2014  Please call pt back

## 2023-06-19 NOTE — TELEPHONE ENCOUNTER
----- Message from Elin Kidd sent at 6/19/2023  3:05 PM CDT -----  .Type:  Needs Medical Advice    Who Called: pt   Would the patient rather a call back or a response via [facility]? Call back   Best Call Back Number: [phone]  Additional Information: pt has a number that was needed for the doctor. It's needed to be called to prove that its medically necessary to take the medication ( [phone])

## 2023-06-19 NOTE — TELEPHONE ENCOUNTER
Pt advised that a prior authorization has to be completed for her medication. Once I receive a decision on it, I will give her a call. Pt verbalized understanding.

## 2023-06-20 ENCOUNTER — HOSPITAL ENCOUNTER (OUTPATIENT)
Dept: RADIOLOGY | Facility: HOSPITAL | Age: 63
Discharge: HOME | End: 2023-06-20
Attending: INTERNAL MEDICINE
Payer: MEDICARE

## 2023-06-20 ENCOUNTER — HOSPITAL ENCOUNTER (OUTPATIENT)
Dept: RADIOLOGY | Facility: HOSPITAL | Age: 63
Discharge: HOME | End: 2023-06-20
Attending: PHYSICIAN ASSISTANT
Payer: MEDICARE

## 2023-06-20 ENCOUNTER — TELEPHONE (OUTPATIENT)
Dept: INTERNAL MEDICINE | Facility: CLINIC | Age: 63
End: 2023-06-20
Payer: MEDICARE

## 2023-06-20 DIAGNOSIS — N64.4 MASTALGIA: ICD-10-CM

## 2023-06-20 DIAGNOSIS — Z78.0 POST-MENOPAUSE: ICD-10-CM

## 2023-06-20 PROCEDURE — 76642 US BREAST LEFT LIMITED: ICD-10-PCS | Mod: 26,LT,, | Performed by: STUDENT IN AN ORGANIZED HEALTH CARE EDUCATION/TRAINING PROGRAM

## 2023-06-20 PROCEDURE — 77080 DXA BONE DENSITY AXIAL: CPT | Mod: TC

## 2023-06-20 PROCEDURE — 76642 ULTRASOUND BREAST LIMITED: CPT | Mod: 26,LT,, | Performed by: STUDENT IN AN ORGANIZED HEALTH CARE EDUCATION/TRAINING PROGRAM

## 2023-06-20 PROCEDURE — 77061 BREAST TOMOSYNTHESIS UNI: CPT | Mod: 26,LT,, | Performed by: STUDENT IN AN ORGANIZED HEALTH CARE EDUCATION/TRAINING PROGRAM

## 2023-06-20 PROCEDURE — 77061 MAMMO DIGITAL DIAGNOSTIC LEFT WITH TOMO: ICD-10-PCS | Mod: 26,LT,, | Performed by: STUDENT IN AN ORGANIZED HEALTH CARE EDUCATION/TRAINING PROGRAM

## 2023-06-20 PROCEDURE — 77061 BREAST TOMOSYNTHESIS UNI: CPT | Mod: TC,LT

## 2023-06-20 PROCEDURE — 77080 DXA BONE DENSITY AXIAL: CPT | Mod: 26,,, | Performed by: RADIOLOGY

## 2023-06-20 PROCEDURE — 77065 DX MAMMO INCL CAD UNI: CPT | Mod: 26,LT,, | Performed by: STUDENT IN AN ORGANIZED HEALTH CARE EDUCATION/TRAINING PROGRAM

## 2023-06-20 PROCEDURE — 76642 ULTRASOUND BREAST LIMITED: CPT | Mod: TC,LT

## 2023-06-20 PROCEDURE — 77080 DXA BONE DENSITY AXIAL SKELETON 1 OR MORE SITES: ICD-10-PCS | Mod: 26,,, | Performed by: RADIOLOGY

## 2023-06-20 PROCEDURE — 77065 MAMMO DIGITAL DIAGNOSTIC LEFT WITH TOMO: ICD-10-PCS | Mod: 26,LT,, | Performed by: STUDENT IN AN ORGANIZED HEALTH CARE EDUCATION/TRAINING PROGRAM

## 2023-06-20 NOTE — TELEPHONE ENCOUNTER
PA for Cymbalta approved and confirmation faxed to pharmacy. Pt advised and verbalized understanding.

## 2023-06-21 ENCOUNTER — TELEPHONE (OUTPATIENT)
Dept: INTERNAL MEDICINE | Facility: CLINIC | Age: 63
End: 2023-06-21
Payer: MEDICARE

## 2023-06-21 NOTE — TELEPHONE ENCOUNTER
----- Message from Irma Mancuso LPN sent at 6/20/2023  8:58 AM CDT -----  Regarding: ketty King 6/28 @2:40  No labs needed.

## 2023-06-22 ENCOUNTER — OFFICE VISIT (OUTPATIENT)
Dept: INTERNAL MEDICINE | Facility: CLINIC | Age: 63
End: 2023-06-22
Payer: MEDICARE

## 2023-06-22 VITALS
HEART RATE: 86 BPM | SYSTOLIC BLOOD PRESSURE: 126 MMHG | WEIGHT: 130.81 LBS | HEIGHT: 61 IN | RESPIRATION RATE: 18 BRPM | OXYGEN SATURATION: 97 % | BODY MASS INDEX: 24.7 KG/M2 | DIASTOLIC BLOOD PRESSURE: 80 MMHG

## 2023-06-22 DIAGNOSIS — Z00.00 WELLNESS EXAMINATION: Primary | ICD-10-CM

## 2023-06-22 DIAGNOSIS — J30.9 ALLERGIC RHINITIS, UNSPECIFIED SEASONALITY, UNSPECIFIED TRIGGER: ICD-10-CM

## 2023-06-22 DIAGNOSIS — N60.92 ATYPICAL DUCTAL HYPERPLASIA OF LEFT BREAST: ICD-10-CM

## 2023-06-22 DIAGNOSIS — M79.7 FIBROMYALGIA: ICD-10-CM

## 2023-06-22 DIAGNOSIS — I10 BENIGN ESSENTIAL HYPERTENSION: ICD-10-CM

## 2023-06-22 DIAGNOSIS — E78.5 HYPERLIPIDEMIA, UNSPECIFIED HYPERLIPIDEMIA TYPE: ICD-10-CM

## 2023-06-22 PROCEDURE — 3079F PR MOST RECENT DIASTOLIC BLOOD PRESSURE 80-89 MM HG: ICD-10-PCS | Mod: CPTII,,, | Performed by: INTERNAL MEDICINE

## 2023-06-22 PROCEDURE — 3008F PR BODY MASS INDEX (BMI) DOCUMENTED: ICD-10-PCS | Mod: CPTII,,, | Performed by: INTERNAL MEDICINE

## 2023-06-22 PROCEDURE — 3074F PR MOST RECENT SYSTOLIC BLOOD PRESSURE < 130 MM HG: ICD-10-PCS | Mod: CPTII,,, | Performed by: INTERNAL MEDICINE

## 2023-06-22 PROCEDURE — 3079F DIAST BP 80-89 MM HG: CPT | Mod: CPTII,,, | Performed by: INTERNAL MEDICINE

## 2023-06-22 PROCEDURE — 99214 PR OFFICE/OUTPT VISIT, EST, LEVL IV, 30-39 MIN: ICD-10-PCS | Mod: ,,, | Performed by: INTERNAL MEDICINE

## 2023-06-22 PROCEDURE — 99214 OFFICE O/P EST MOD 30 MIN: CPT | Mod: ,,, | Performed by: INTERNAL MEDICINE

## 2023-06-22 PROCEDURE — 4010F PR ACE/ARB THEARPY RXD/TAKEN: ICD-10-PCS | Mod: CPTII,,, | Performed by: INTERNAL MEDICINE

## 2023-06-22 PROCEDURE — 3008F BODY MASS INDEX DOCD: CPT | Mod: CPTII,,, | Performed by: INTERNAL MEDICINE

## 2023-06-22 PROCEDURE — 1159F PR MEDICATION LIST DOCUMENTED IN MEDICAL RECORD: ICD-10-PCS | Mod: CPTII,,, | Performed by: INTERNAL MEDICINE

## 2023-06-22 PROCEDURE — 1159F MED LIST DOCD IN RCRD: CPT | Mod: CPTII,,, | Performed by: INTERNAL MEDICINE

## 2023-06-22 PROCEDURE — 3074F SYST BP LT 130 MM HG: CPT | Mod: CPTII,,, | Performed by: INTERNAL MEDICINE

## 2023-06-22 PROCEDURE — 4010F ACE/ARB THERAPY RXD/TAKEN: CPT | Mod: CPTII,,, | Performed by: INTERNAL MEDICINE

## 2023-06-22 RX ORDER — NITROFURANTOIN 25; 75 MG/1; MG/1
100 CAPSULE ORAL DAILY
Qty: 30 CAPSULE | Refills: 5 | Status: SHIPPED | OUTPATIENT
Start: 2023-06-22

## 2023-06-22 RX ORDER — RALOXIFENE HYDROCHLORIDE 60 MG/1
60 TABLET, FILM COATED ORAL DAILY
Qty: 30 TABLET | Refills: 11 | Status: SHIPPED | OUTPATIENT
Start: 2023-06-22 | End: 2024-06-21

## 2023-06-22 NOTE — PROGRESS NOTES
"Subjective:       Patient ID: Eusebia Miramontes is a 62 y.o. female.    Chief Complaint: Follow-up      Patient is a 62-year-old woman with numerous medical problems who is in for follow-up.  She sees Dr. Dailey for fibromyalgia.  According to her she asked that we consider the use of Macrodantin to help with her chronic bladder problems.  She also stated that her breast surgeon was in favor her starting Evista to help prevent osteoporosis.    She does hurt everywhere.  She also has numerous social problems in particular family problems.  There also financial problems.    Follow-up    Review of Systems     Current Outpatient Medications on File Prior to Visit   Medication Sig Dispense Refill    cyanocobalamin (VITAMIN B-12) 100 MCG tablet Take 100 mcg by mouth once daily.      DULoxetine (CYMBALTA) 30 MG capsule Take 1 capsule (30 mg total) by mouth once daily. 30 capsule 11    fluticasone propionate (FLONASE) 50 mcg/actuation nasal spray 1 spray by Each Nostril route 2 (two) times daily.      levocetirizine (XYZAL) 5 MG tablet Take 5 mg by mouth every evening.      metaxalone (SKELAXIN) 800 MG tablet Take 1 tablet (800 mg total) by mouth once daily. 30 tablet 11    montelukast (SINGULAIR) 10 mg tablet Take 10 mg by mouth every evening.      olmesartan (BENICAR) 20 MG tablet Take 1 tablet (20 mg total) by mouth once daily. 30 tablet 11    pantoprazole (PROTONIX) 40 MG tablet Take 40 mg by mouth 2 (two) times daily.      vitamin D (VITAMIN D3) 1000 units Tab Take 1,000 Units by mouth once daily.      clorazepate (TRANXENE) 15 MG tablet Take 15 mg by mouth nightly as needed.      loratadine (CLARITIN) 10 mg tablet Take 10 mg by mouth once daily.       No current facility-administered medications on file prior to visit.     Objective:      /80 (BP Location: Right arm, Patient Position: Sitting, BP Method: Large (Manual))   Pulse 86   Resp 18   Ht 5' 1" (1.549 m)   Wt 59.3 kg (130 lb 12.8 oz)   SpO2 97%   BMI " 24.71 kg/m²     Physical Exam  Vitals reviewed.   Constitutional:       Appearance: Normal appearance.   HENT:      Head: Normocephalic.      Nose: Nose normal.      Mouth/Throat:      Pharynx: Oropharynx is clear.   Eyes:      Pupils: Pupils are equal, round, and reactive to light.   Neck:      Thyroid: No thyromegaly.   Cardiovascular:      Rate and Rhythm: Normal rate and regular rhythm.      Pulses: Normal pulses.   Abdominal:      General: Abdomen is flat. Bowel sounds are normal.      Palpations: Abdomen is soft. There is no hepatomegaly, splenomegaly or mass.      Tenderness: There is no abdominal tenderness.   Musculoskeletal:      Cervical back: Neck supple.   Lymphadenopathy:      Cervical: No cervical adenopathy.   Skin:     General: Skin is warm and dry.   Neurological:      Mental Status: She is alert.     Lab work and bone density study reviewed  Assessment:       1. Hypertension.  Excellent control    2. Hyperlipidemia.  Adequate control    3. History of atypical ductal hyperplasia breast.  Followed by Dr. Crowe     4. Osteopenia bordering on osteoporosis.      5. Apparent interstitial cystitis.      6. Fibromyalgia    7. Anxiety/depression.    Plan:     Okay to begin Evista 60 mg p.o. daily, Macrodantin 100 daily, consider increase Cymbalta after she is been on the other 2 new meds for 1 month.  She will call and let us know and we can increase the dose over the phone.  Follow-up with me 3 months.

## 2023-06-29 ENCOUNTER — TELEPHONE (OUTPATIENT)
Dept: INTERNAL MEDICINE | Facility: CLINIC | Age: 63
End: 2023-06-29
Payer: MEDICARE

## 2023-06-29 DIAGNOSIS — G47.00 INSOMNIA, UNSPECIFIED TYPE: Primary | ICD-10-CM

## 2023-06-29 RX ORDER — TRAZODONE HYDROCHLORIDE 50 MG/1
50 TABLET ORAL NIGHTLY
Qty: 30 TABLET | Refills: 2 | Status: SHIPPED | OUTPATIENT
Start: 2023-06-29 | End: 2023-08-15

## 2023-06-29 NOTE — TELEPHONE ENCOUNTER
----- Message from Payton Ryan sent at 6/29/2023 10:48 AM CDT -----  Regarding: Needs Medical Advice  Type:  Needs Medical Advice    Who Called: pt  Symptoms (please be specific):  Can't sleep at night; neck back pain & muscle relaxers not working.  How long has patient had these symptoms:    Pharmacy name and phone #:  [phone] - [place], [place] - [address]  Would the patient rather a call back or a response via [facility]?   Best Call Back Number: [phone]  Additional Information: pt called regarding having sleeping issues. She stated she mentioned it to Dr. Molina on her last appt on 6/22 & something was supposed to be ordered but has not been. Called the b/l & no ans. Pt stated she just needs to sleep; Please call pt.

## 2023-07-19 DIAGNOSIS — Z91.89 AT HIGH RISK FOR BREAST CANCER: Primary | ICD-10-CM

## 2023-07-19 DIAGNOSIS — N60.92 ATYPICAL DUCTAL HYPERPLASIA OF LEFT BREAST: ICD-10-CM

## 2023-08-15 ENCOUNTER — OFFICE VISIT (OUTPATIENT)
Dept: INTERNAL MEDICINE | Facility: CLINIC | Age: 63
End: 2023-08-15
Payer: MEDICARE

## 2023-08-15 VITALS
HEIGHT: 61 IN | HEART RATE: 71 BPM | RESPIRATION RATE: 18 BRPM | OXYGEN SATURATION: 99 % | DIASTOLIC BLOOD PRESSURE: 86 MMHG | SYSTOLIC BLOOD PRESSURE: 124 MMHG | BODY MASS INDEX: 25.3 KG/M2 | WEIGHT: 134 LBS

## 2023-08-15 DIAGNOSIS — M79.7 FIBROMYALGIA: ICD-10-CM

## 2023-08-15 DIAGNOSIS — R05.9 COUGH: ICD-10-CM

## 2023-08-15 DIAGNOSIS — J02.9 SORE THROAT: ICD-10-CM

## 2023-08-15 DIAGNOSIS — J34.89 SINUS PRESSURE: ICD-10-CM

## 2023-08-15 DIAGNOSIS — I10 BENIGN ESSENTIAL HYPERTENSION: ICD-10-CM

## 2023-08-15 DIAGNOSIS — N60.92 ATYPICAL DUCTAL HYPERPLASIA OF LEFT BREAST: ICD-10-CM

## 2023-08-15 DIAGNOSIS — J06.9 ACUTE UPPER RESPIRATORY INFECTION, UNSPECIFIED: ICD-10-CM

## 2023-08-15 DIAGNOSIS — R05.9 COUGH, UNSPECIFIED TYPE: Primary | ICD-10-CM

## 2023-08-15 DIAGNOSIS — J06.9 UPPER RESPIRATORY TRACT INFECTION, UNSPECIFIED TYPE: Primary | ICD-10-CM

## 2023-08-15 DIAGNOSIS — E78.5 HYPERLIPIDEMIA, UNSPECIFIED HYPERLIPIDEMIA TYPE: ICD-10-CM

## 2023-08-15 DIAGNOSIS — H92.09 OTALGIA, UNSPECIFIED LATERALITY: ICD-10-CM

## 2023-08-15 PROCEDURE — 99214 PR OFFICE/OUTPT VISIT, EST, LEVL IV, 30-39 MIN: ICD-10-PCS | Mod: ,,, | Performed by: INTERNAL MEDICINE

## 2023-08-15 PROCEDURE — 3079F DIAST BP 80-89 MM HG: CPT | Mod: CPTII,,, | Performed by: INTERNAL MEDICINE

## 2023-08-15 PROCEDURE — 4010F ACE/ARB THERAPY RXD/TAKEN: CPT | Mod: CPTII,,, | Performed by: INTERNAL MEDICINE

## 2023-08-15 PROCEDURE — 3074F SYST BP LT 130 MM HG: CPT | Mod: CPTII,,, | Performed by: INTERNAL MEDICINE

## 2023-08-15 PROCEDURE — 4010F PR ACE/ARB THEARPY RXD/TAKEN: ICD-10-PCS | Mod: CPTII,,, | Performed by: INTERNAL MEDICINE

## 2023-08-15 PROCEDURE — 3008F BODY MASS INDEX DOCD: CPT | Mod: CPTII,,, | Performed by: INTERNAL MEDICINE

## 2023-08-15 PROCEDURE — 3074F PR MOST RECENT SYSTOLIC BLOOD PRESSURE < 130 MM HG: ICD-10-PCS | Mod: CPTII,,, | Performed by: INTERNAL MEDICINE

## 2023-08-15 PROCEDURE — 1159F MED LIST DOCD IN RCRD: CPT | Mod: CPTII,,, | Performed by: INTERNAL MEDICINE

## 2023-08-15 PROCEDURE — 99214 OFFICE O/P EST MOD 30 MIN: CPT | Mod: ,,, | Performed by: INTERNAL MEDICINE

## 2023-08-15 PROCEDURE — 1159F PR MEDICATION LIST DOCUMENTED IN MEDICAL RECORD: ICD-10-PCS | Mod: CPTII,,, | Performed by: INTERNAL MEDICINE

## 2023-08-15 PROCEDURE — 3079F PR MOST RECENT DIASTOLIC BLOOD PRESSURE 80-89 MM HG: ICD-10-PCS | Mod: CPTII,,, | Performed by: INTERNAL MEDICINE

## 2023-08-15 PROCEDURE — 3008F PR BODY MASS INDEX (BMI) DOCUMENTED: ICD-10-PCS | Mod: CPTII,,, | Performed by: INTERNAL MEDICINE

## 2023-08-15 RX ORDER — DULOXETIN HYDROCHLORIDE 60 MG/1
60 CAPSULE, DELAYED RELEASE ORAL DAILY
Qty: 30 CAPSULE | Refills: 11 | Status: SHIPPED | OUTPATIENT
Start: 2023-08-15

## 2023-08-15 RX ORDER — CLORAZEPATE DIPOTASSIUM 15 MG/1
15 TABLET ORAL NIGHTLY PRN
Qty: 15 TABLET | Refills: 5 | Status: SHIPPED | OUTPATIENT
Start: 2023-08-15 | End: 2024-02-27 | Stop reason: SDUPTHER

## 2023-08-15 RX ORDER — AZITHROMYCIN 250 MG/1
TABLET, FILM COATED ORAL
Qty: 6 TABLET | Refills: 0 | Status: SHIPPED | OUTPATIENT
Start: 2023-08-15 | End: 2023-08-20

## 2023-08-15 NOTE — PROGRESS NOTES
Subjective:       Patient ID: Eusebia Miramontes is a 62 y.o. female.    Chief Complaint: Sore Throat and Otalgia (Right arm numbness. All symptoms since Friday.)      Patient is a 62-year-old woman who comes in complaining of mild cough, sore throat, nasal congestion, stuffy years for the last 4 days.  Some chills but no documented fever.  Cough mainly nonproductive.    Quit taking the trazodone for sleep.  Made her feel bad the next day.  She is requesting she get back on transient take at bedtime.  It helps relax the neck muscles and also helped her sleep.  In reviewing her records it appears she did not get that many transient over the course of the last couple of years and was initially prescribed by her gynecologist.  I did discuss the addictive possibilities with this medication and recommended that she not take it every night but limited to no more than 15 per month.  She was agreeable.    Sore Throat   Associated symptoms include ear pain.   Otalgia   Associated symptoms include a sore throat.     Review of Systems   HENT:  Positive for ear pain and sore throat.         Current Outpatient Medications on File Prior to Visit   Medication Sig Dispense Refill    cyanocobalamin (VITAMIN B-12) 100 MCG tablet Take 100 mcg by mouth once daily.      fluticasone propionate (FLONASE) 50 mcg/actuation nasal spray 1 spray by Each Nostril route 2 (two) times daily.      levocetirizine (XYZAL) 5 MG tablet Take 5 mg by mouth every evening.      loratadine (CLARITIN) 10 mg tablet Take 10 mg by mouth once daily.      metaxalone (SKELAXIN) 800 MG tablet Take 1 tablet (800 mg total) by mouth once daily. 30 tablet 11    nitrofurantoin, macrocrystal-monohydrate, (MACROBID) 100 MG capsule Take 1 capsule (100 mg total) by mouth once daily. 30 capsule 5    olmesartan (BENICAR) 20 MG tablet Take 1 tablet (20 mg total) by mouth once daily. 30 tablet 11    raloxifene (EVISTA) 60 mg tablet Take 1 tablet (60 mg total) by mouth once daily. 30  "tablet 11    vitamin D (VITAMIN D3) 1000 units Tab Take 1,000 Units by mouth once daily.      [DISCONTINUED] DULoxetine (CYMBALTA) 30 MG capsule Take 1 capsule (30 mg total) by mouth once daily. 30 capsule 11    montelukast (SINGULAIR) 10 mg tablet Take 10 mg by mouth every evening.      pantoprazole (PROTONIX) 40 MG tablet Take 40 mg by mouth 2 (two) times daily.      [DISCONTINUED] clorazepate (TRANXENE) 15 MG tablet Take 15 mg by mouth nightly as needed.      [DISCONTINUED] traZODone (DESYREL) 50 MG tablet Take 1 tablet (50 mg total) by mouth every evening. (Patient not taking: Reported on 8/15/2023) 30 tablet 2     No current facility-administered medications on file prior to visit.     Objective:      /86 (BP Location: Right arm, Patient Position: Sitting, BP Method: Large (Manual))   Pulse 71   Resp 18   Ht 5' 1" (1.549 m)   Wt 60.8 kg (134 lb)   SpO2 99%   BMI 25.32 kg/m²     Physical Exam  Vitals reviewed.   Constitutional:       Appearance: Normal appearance.   HENT:      Head: Normocephalic.      Nose: Nose normal.      Mouth/Throat:      Pharynx: Oropharynx is clear.      Comments: Throat was slightly red especially on the right side.  No tonsillar enlargement noted.  Eyes:      Pupils: Pupils are equal, round, and reactive to light.   Neck:      Thyroid: No thyromegaly.      Comments: No adenopathy.  Cardiovascular:      Rate and Rhythm: Normal rate and regular rhythm.      Pulses: Normal pulses.   Abdominal:      General: Abdomen is flat. Bowel sounds are normal.      Palpations: Abdomen is soft. There is no hepatomegaly, splenomegaly or mass.      Tenderness: There is no abdominal tenderness.   Musculoskeletal:      Cervical back: Neck supple.   Lymphadenopathy:      Cervical: No cervical adenopathy.   Skin:     General: Skin is warm and dry.   Neurological:      Mental Status: She is alert.       Lab work from her rheumatologist appointment 2 months ago reviewed  Assessment:       1. Cough "      1. URI.  Certainly could be COVID although I think it is relatively unlikely with her symptom complex    2. Possible strep pharyngitis     3. Likely sinusitis    4. Fibromyalgia.  I considered screening for polymyalgia but she had that done recently by her rheumatologist with a normal sed rate and CRP.    5. Insomnia    6. Degenerative joint disease    Plan:     Will check a COVID swab.  Empiric Z-Judah.  Okay to increase Cymbalta to 60 mg daily as we had discussed at her last visit.  We will also add transient 15 at bedtime but no more than 15 per month.  Follow-up with me as previously scheduled.

## 2023-08-16 ENCOUNTER — TELEPHONE (OUTPATIENT)
Dept: INTERNAL MEDICINE | Facility: CLINIC | Age: 63
End: 2023-08-16
Payer: MEDICARE

## 2023-08-16 NOTE — TELEPHONE ENCOUNTER
----- Message from Bushra Scott sent at 8/16/2023  4:36 PM CDT -----  .Type:  Needs Medical Advice    Who Called: pt  Symptoms (please be specific):    How long has patient had these symptoms:    Pharmacy name and phone #:    Would the patient rather a call back or a response via [facility]?   Best Call Back Number: [phone]  Additional Information: pt said raloxifene hcl 60mg is causing her shoulder pain she read on all the side affects

## 2023-08-16 NOTE — TELEPHONE ENCOUNTER
----- Message from [facility] sent at 8/16/2023  8:29 AM CDT -----  .Type:  Needs Medical Advice    Who Called: pt    Symptoms (please be specific):  both arms are heavy, irration on her vagina, head congested,       How long has patient had these symptoms:   one week    Pharmacy name and phone #:   [facility] in [place]    Would the patient rather a call back? Yes    Best Call Back Number:  [phone]    Additional Information:  pt says she does feel good she says her arms are really heavy she cant  anything

## 2023-08-16 NOTE — TELEPHONE ENCOUNTER
Pt advised to follow up with Dr. Dailey and her gynecologist. Pt also started on Z-pack yesterday and was advised it will take time to work. She verbalized understanding.

## 2023-08-23 ENCOUNTER — TELEPHONE (OUTPATIENT)
Dept: INTERNAL MEDICINE | Facility: CLINIC | Age: 63
End: 2023-08-23
Payer: MEDICARE

## 2023-08-23 NOTE — TELEPHONE ENCOUNTER
Pt refuses to get a urinalysis done. She is on nitrofurantoin as a preventative medication. Still having ear and throat pain. Please advise.

## 2023-08-23 NOTE — TELEPHONE ENCOUNTER
----- Message from uLisa Salvador sent at 8/23/2023  9:45 AM CDT -----  Regarding: med  .Type:  Needs Medical Advice    Who Called: pt  Symptoms (please be specific):    How long has patient had these symptoms:  2 weeks  Pharmacy name and phone #:    Would the patient rather a call back or a response via [facility]?   Best Call Back Number: [phone]  Additional Information: ear and throat hurting - pt aslo has Bladder Infection - Call to advise

## 2023-08-23 NOTE — TELEPHONE ENCOUNTER
Patient c/o lower abd pressure, frequent urination, dysuria.     Patient also stated her ears and throat are no better since LOV.     Please advise.

## 2023-08-24 DIAGNOSIS — H92.09 OTALGIA, UNSPECIFIED LATERALITY: Primary | ICD-10-CM

## 2023-08-25 ENCOUNTER — APPOINTMENT (OUTPATIENT)
Dept: RADIOLOGY | Facility: HOSPITAL | Age: 63
End: 2023-08-25
Attending: PHYSICIAN ASSISTANT
Payer: MEDICARE

## 2023-08-25 DIAGNOSIS — Z91.89 AT HIGH RISK FOR BREAST CANCER: ICD-10-CM

## 2023-08-25 DIAGNOSIS — N60.92 ATYPICAL DUCTAL HYPERPLASIA OF LEFT BREAST: ICD-10-CM

## 2023-08-25 LAB
CREAT SERPL-MCNC: 0.8 MG/DL (ref 0.5–1.4)
SAMPLE: NORMAL

## 2023-08-25 PROCEDURE — 77049 MRI BREAST C-+ W/CAD BI: CPT | Mod: 26,,, | Performed by: STUDENT IN AN ORGANIZED HEALTH CARE EDUCATION/TRAINING PROGRAM

## 2023-08-25 PROCEDURE — 77049 MRI BREAST C-+ W/CAD BI: CPT | Mod: TC

## 2023-08-25 PROCEDURE — 25500020 PHARM REV CODE 255: Performed by: PHYSICIAN ASSISTANT

## 2023-08-25 PROCEDURE — 77049 MRI BREAST W/WO CONTRAST, W/CAD, BILATERAL: ICD-10-PCS | Mod: 26,,, | Performed by: STUDENT IN AN ORGANIZED HEALTH CARE EDUCATION/TRAINING PROGRAM

## 2023-08-25 PROCEDURE — A9577 INJ MULTIHANCE: HCPCS | Performed by: PHYSICIAN ASSISTANT

## 2023-08-25 RX ADMIN — GADOBENATE DIMEGLUMINE 14 ML: 529 INJECTION, SOLUTION INTRAVENOUS at 03:08

## 2023-08-30 ENCOUNTER — TELEPHONE (OUTPATIENT)
Dept: SURGERY | Facility: CLINIC | Age: 63
End: 2023-08-30
Payer: MEDICARE

## 2023-08-30 DIAGNOSIS — M89.9 BONE LESION: Primary | ICD-10-CM

## 2023-08-30 NOTE — TELEPHONE ENCOUNTER
Spoke with patient regarding her MRI results. Informed her that a Bone Scan is recommended to assess the sternal lesion that was seen. She verbalized understanding. Bone scan order placed, and scheduled for 9/14/2023 at 0815 (arrival at 0745).  Patient is aware of this appointment.          ----- Message from JORGE Morales sent at 8/30/2023  8:35 AM CDT -----  Hi Francisco,    Can you call patient and let her know that I've reviewed her breast MRI which is showed no abnormal findings in the breasts. They do note a very very small lesion on her sternum. This could represent many different things but we cannot rule out malignancy without further testing.  I would recommend doing a bone scan to evaluate this further. I'll place the order and we can get this scheduled.    Thanks!  Niyah

## 2023-08-30 NOTE — TELEPHONE ENCOUNTER
Called patient. No answer. Left message for patient to call back.        ----- Message from JORGE Morales sent at 8/30/2023  8:35 AM CDT -----  William Singh,    Can you call patient and let her know that I've reviewed her breast MRI which is showed no abnormal findings in the breasts. They do note a very very small lesion on her sternum. This could represent many different things but we cannot rule out malignancy without further testing.  I would recommend doing a bone scan to evaluate this further. I'll place the order and we can get this scheduled.    Thanks!  Niyah

## 2023-09-07 ENCOUNTER — PATIENT MESSAGE (OUTPATIENT)
Dept: RESEARCH | Facility: HOSPITAL | Age: 63
End: 2023-09-07
Payer: MEDICARE

## 2023-09-14 ENCOUNTER — HOSPITAL ENCOUNTER (OUTPATIENT)
Dept: RADIOLOGY | Facility: HOSPITAL | Age: 63
Discharge: HOME | End: 2023-09-14
Attending: PHYSICIAN ASSISTANT
Payer: MEDICARE

## 2023-09-14 DIAGNOSIS — M89.9 BONE LESION: ICD-10-CM

## 2023-09-14 PROCEDURE — A9503 TC99M MEDRONATE: HCPCS

## 2023-09-15 ENCOUNTER — TELEPHONE (OUTPATIENT)
Dept: SURGERY | Facility: CLINIC | Age: 63
End: 2023-09-15
Payer: MEDICARE

## 2023-09-15 NOTE — TELEPHONE ENCOUNTER
Patient called with her results.        ----- Message from JORGE Morales sent at 9/15/2023  9:40 AM CDT -----  Hi Jan!  Please call patient to let her know that her bone scan is negative with no signs of cancer, particularly reassure her that no abnormalities seen on the sternum.  No further work up recommended.    Thanks!

## 2023-09-15 NOTE — PROGRESS NOTES
Hi Francisco!  Please call patient to let her know that her bone scan is negative with no signs of cancer, particularly reassure her that no abnormalities seen on the sternum.  No further work up recommended.    Thanks!

## 2023-09-27 ENCOUNTER — TELEPHONE (OUTPATIENT)
Dept: INTERNAL MEDICINE | Facility: CLINIC | Age: 63
End: 2023-09-27
Payer: MEDICARE

## 2023-09-27 NOTE — TELEPHONE ENCOUNTER
----- Message from Irma Mancuso LPN sent at 9/26/2023  2:09 PM CDT -----  Regarding: ketty King 10/4 @3:20  No labs needed.

## 2023-11-09 ENCOUNTER — HOSPITAL ENCOUNTER (OUTPATIENT)
Dept: RADIOLOGY | Facility: HOSPITAL | Age: 63
Discharge: HOME | End: 2023-11-09
Attending: PHYSICIAN ASSISTANT
Payer: MEDICARE

## 2023-11-09 DIAGNOSIS — Z12.31 SCREENING MAMMOGRAM, ENCOUNTER FOR: ICD-10-CM

## 2023-11-09 PROCEDURE — 77063 MAMMO DIGITAL SCREENING BILAT WITH TOMO: ICD-10-PCS | Mod: 26,,, | Performed by: RADIOLOGY

## 2023-11-09 PROCEDURE — 77067 SCR MAMMO BI INCL CAD: CPT | Mod: 26,,, | Performed by: RADIOLOGY

## 2023-11-09 PROCEDURE — 77063 BREAST TOMOSYNTHESIS BI: CPT | Mod: 26,,, | Performed by: RADIOLOGY

## 2023-11-09 PROCEDURE — 77067 SCR MAMMO BI INCL CAD: CPT | Mod: TC

## 2023-11-09 PROCEDURE — 77067 MAMMO DIGITAL SCREENING BILAT WITH TOMO: ICD-10-PCS | Mod: 26,,, | Performed by: RADIOLOGY

## 2023-12-05 ENCOUNTER — HOSPITAL ENCOUNTER (OUTPATIENT)
Dept: RADIOLOGY | Facility: HOSPITAL | Age: 63
Discharge: HOME | End: 2023-12-05
Attending: PHYSICIAN ASSISTANT
Payer: MEDICARE

## 2023-12-05 DIAGNOSIS — R92.8 ABNORMAL MAMMOGRAM: ICD-10-CM

## 2023-12-05 PROCEDURE — 76642 US BREAST LEFT LIMITED: ICD-10-PCS | Mod: 26,LT,, | Performed by: RADIOLOGY

## 2023-12-05 PROCEDURE — 77065 MAMMO DIGITAL DIAGNOSTIC LEFT WITH TOMO: ICD-10-PCS | Mod: 26,LT,, | Performed by: RADIOLOGY

## 2023-12-05 PROCEDURE — 76642 ULTRASOUND BREAST LIMITED: CPT | Mod: 26,LT,, | Performed by: RADIOLOGY

## 2023-12-05 PROCEDURE — 76642 ULTRASOUND BREAST LIMITED: CPT | Mod: TC,LT

## 2023-12-05 PROCEDURE — 77061 MAMMO DIGITAL DIAGNOSTIC LEFT WITH TOMO: ICD-10-PCS | Mod: 26,LT,, | Performed by: RADIOLOGY

## 2023-12-05 PROCEDURE — 77061 BREAST TOMOSYNTHESIS UNI: CPT | Mod: 26,LT,, | Performed by: RADIOLOGY

## 2023-12-05 PROCEDURE — 77065 DX MAMMO INCL CAD UNI: CPT | Mod: TC,LT

## 2023-12-05 PROCEDURE — 77065 DX MAMMO INCL CAD UNI: CPT | Mod: 26,LT,, | Performed by: RADIOLOGY

## 2023-12-11 NOTE — PROGRESS NOTES
[facility] - [facility]  Breast Surgical Oncology  Follow Up Patient Office Visit - H&P      Referring Provider: No ref. provider found  PCP: Leobardo Molina MD     Chief Complaint:   Chief Complaint   Patient presents with    Follow-up     Patient c/o bilateral breast pain (left>right), constant aching/throbbing pain in nipple area left breast, no swelling, right breast intermittent generalized aching pain, taking Tylenol with no relief      Subjective:     Treatments:  1/13/2023 - Left Breast Excisional Biopsy targeting two adjacent masses in the 5:00 position 6 cmfn    Interval History:  12/12/2023 - Eusebia Miramontes returns today for follow up of high risk. In the interval, she underwent breast MRI which was benign but incidentally noted a nonspecific and very small osseous lesion in the sternum. This was followed up with a bone scan which showed no suspicious findings. She also presented for SCR MG in November which was recalled for further evaluation of asymmetries in the left breast. Diagnostic MG/US work up of these areas was benign. Fibrocystic changes noted. She does report tenderness in the bilateral breasts, which is intermittent and has occurred for the last year.  She avoids underwire bras. She does drink a few cups of coffee daily and alcohol a few times per week. She has tried Tylenol previously which did not help much with the tenderness.    HPI:  Eusebia Miramontes is a 63 y.o. female who presents on 12/27/2022 for evaluation of  left breast complex sclerosing lesions . She is s/p left breast excisional biopsy on 1/13/2023. Surgical pathology revealed residual complex sclerosing lesion with focal atypical ductal hyperplasia. Her lifetime risk for breast cancer was re-calculated to include ADH and is found to be elevated according to the Tyrer Cuzick model (34.1% on v7 and 28.1% on v8). She met with Dr. Dee and declined chemoprevention with AI or Tamoxifen. She was noted  to have a low BMD, and Dr. Dailey started her on Evista, which treats low BMD and also is shown to decrease risk of invasive breast cancer in women at high risk although less effective than Tamoxifen. She did not tolerate the medication well so it was discontinued. She elected to continue high risk screening with supplemental breast MRIs.    She currently denies any breast issues including rashes, redness, pain, swelling, nipple discharge, or new lumps/masses.    Imagin2022 SCR MG at [facility] - There is a 0.5 cm oval mass with an indistinct margin in the left breast at 6:00 posterior depth. BI-RADS 0: Incomplete: Left diagnostic mammography with tomosynthesis (to include spot compression views), followed by targeted left breast ultrasound if needed.    2022 L DG MG at [facility] - In the 5:00 left breast posterior depth, there is a 0.6 cm oval mass with circumscribed margins, which corresponds to the mass seen in the 6:00 left breast posterior depth on recent screening mammogram. Targeted US in the 5:00 left breast 6 cm from the nipple reveals two adjacent parallel oval avascular heterogeneous masses with circumscribed margins, which are located approximately 1 cm apart. The larger of the two masses, measuring 0.6 cm x 0.3 cm x 0.5 cm, correlates with the oval mass in the 5:00 left breast posterior depth on mammogram.  The second mass measures 0.5 cm x 0.2 cm x 0.4 cm. BIRADS 4: Left breast 5:00 6 cm from the nipple two adjacent oval circumscribed heterogeneous masses are suspicious for malignancy. Ultrasound guided core needle biopsy of the larger of the two masses in the 5:00 left breast 6 cm from the nipple is recommended.    2023 Breast MRI at [facility] - 1) No MRI evidence of malignancy in either breast.  2) Left breast lower outer quadrant middle to posterior depths surgical excisional biopsy site demonstrates no abnormal enhancement.  3) 0.5 cm oval circumscribed low T1 signal, high T2 signal,  homogeneously enhancing osseous lesion in the central aspect of the manubrium.  This finding is quite small and nonspecific; however, the differential diagnostic possibilities include malignancy or other osseous lesions that may need treatment.    9/15/2023 NM Bone Scan Whole Body - Impression: There is no scintigraphic evidence of metastatic disease.    2023 SCR MG - 1. The focal asymmetry with an associated calcification in the 6:00 left breast, middle depth, needs further evaluation.  2. The asymmetry in the left breast middle depth superior region seen on the mediolateral oblique view only needs further evaluation.   Recommend a left diagnostic mammogram (to include magnification and spot compression tomosynthesis views) and possible left breast ultrasound if needed.     2023 L DG MG and L breast US - BIRADS 2: BENIGN: 1. No mammographic evidence of malignancy is seen involving the left breast.  No sonographic evidence of malignancy is seen involving the superior, 5:00, 6:00, or 7:00 left breast.2. Fibrocystic changes in the left breast are benign.    Pathology:   2022 - Left Breast 5:00 6 cmfn - Complex sclerosing lesion with moderate duct epithelial hyperplasia, usual type.    Please note that radiology recommends that this pathology is also applied to the additional oval, heterogeneous mass with circumscribed margins in the 5:00 left breast, 6 cm from the nipple.    2023 Left Breast Excisional Biopsy - residual complex sclerosing lesion with focal atypical ductal hyperplasia. The margins are free of atypical proliferation.    OB/GYN History:  Age at Menarche Onset: 15  Menopausal Status: postmenopausal, at age 48  Hysterectomy/Oophorectomy: no  Hormonal birth control (duration): 20 years  Pregnancy History:   Age at first live birth: 23  Hormone Replacement Therapy: Yes,  reports progesterone (but likely combination HRT), stopped 5-7 years ago    Other:  # of breast biopsies (when and  pathology results): 2022 - [facility]  MG breast density: Category B  Prior thoracic RT: none  Genetic testing: none  Ashkenazi [employer]ism descent: No    Family History:  Family History   Problem Relation Age of Onset    Dementia Mother     Arthritis Mother         Parkisons and dementia    Stomach cancer Maternal Aunt         diagnosed in her late 70s    Leukemia Paternal Uncle     Arthritis Father         Kidney Failure,Heart problems,COPD,    Birth defects Son         Intestines twisted cause of death also he  had cerebral Palsy        Patient History:  Past Medical History:   Diagnosis Date    Breast cyst     left breast    Fibromyalgia     GERD (gastroesophageal reflux disease)     Hyperlipidemia     Hypertension     Systemic lupus erythematosus, organ or system involvement unspecified        Past Surgical History:   Procedure Laterality Date    ADENOIDECTOMY      BILATERAL TUBAL LIGATION      BREAST SURGERY Left     EXCISION, MASS, BREAST, USING RADIOLOGICAL MARKER Left 2023    Procedure: EXCISION,MASS,BREAST,USING RADIOLOGICAL MARKER Left;  Surgeon: Carmen Gomez MD;  Location: [facility];  Service: General;  Laterality: Left;    PARATHYROIDECTOMY      TONSILLECTOMY      TUBAL LIGATION  3/3/1997       Social History     Socioeconomic History    Marital status: Legally    Tobacco Use    Smoking status: Former     Current packs/day: 0.00     Average packs/day: 0.3 packs/day for 5.0 years (1.3 ttl pk-yrs)     Types: Cigarettes     Start date: 1985     Quit date: 1990     Years since quittin.0    Smokeless tobacco: Never    Tobacco comments:     Only smoked a pack per week only smoked if I was out   Substance and Sexual Activity    Alcohol use: Not Currently     Comment: socially    Drug use: Not Currently     Types: Marijuana    Sexual activity: Not Currently     Partners: Male     Birth control/protection: None       Immunization History   Administered Date(s) Administered    COVID-19,  MRNA, LN-S, PF (Pfizer) (Purple Cap) 03/05/2021, 03/26/2021       Medications/Allergies:    Current Outpatient Medications:     clorazepate (TRANXENE) 15 MG tablet, Take 1 tablet (15 mg total) by mouth nightly as needed for Insomnia., Disp: 15 tablet, Rfl: 5    cyanocobalamin (VITAMIN B-12) 100 MCG tablet, Take 100 mcg by mouth once daily., Disp: , Rfl:     DULoxetine (CYMBALTA) 60 MG capsule, Take 1 capsule (60 mg total) by mouth once daily., Disp: 30 capsule, Rfl: 11    fluticasone propionate (FLONASE) 50 mcg/actuation nasal spray, 1 spray by Each Nostril route 2 (two) times daily., Disp: , Rfl:     levocetirizine (XYZAL) 5 MG tablet, Take 5 mg by mouth every evening., Disp: , Rfl:     loratadine (CLARITIN) 10 mg tablet, Take 10 mg by mouth once daily., Disp: , Rfl:     metaxalone (SKELAXIN) 800 MG tablet, Take 1 tablet (800 mg total) by mouth once daily., Disp: 30 tablet, Rfl: 11    nitrofurantoin, macrocrystal-monohydrate, (MACROBID) 100 MG capsule, Take 1 capsule (100 mg total) by mouth once daily., Disp: 30 capsule, Rfl: 5    olmesartan (BENICAR) 20 MG tablet, Take 1 tablet (20 mg total) by mouth once daily., Disp: 30 tablet, Rfl: 11    RESTASIS 0.05 % ophthalmic emulsion, Place 1 drop into both eyes 2 (two) times daily., Disp: , Rfl:     vitamin D (VITAMIN D3) 1000 units Tab, Take 1,000 Units by mouth once daily., Disp: , Rfl:     montelukast (SINGULAIR) 10 mg tablet, Take 10 mg by mouth every evening., Disp: , Rfl:     pantoprazole (PROTONIX) 40 MG tablet, Take 40 mg by mouth 2 (two) times daily., Disp: , Rfl:     raloxifene (EVISTA) 60 mg tablet, Take 1 tablet (60 mg total) by mouth once daily. (Patient not taking: Reported on 12/12/2023), Disp: 30 tablet, Rfl: 11     Review of patient's allergies indicates:  No Known Allergies    Review of Systems:  Review of Systems   Constitutional:  Positive for malaise/fatigue. Negative for chills, fever and weight loss.        Reports fatigue related to mood   HENT:   Negative for congestion and sore throat.    Eyes:  Negative for blurred vision and double vision.   Respiratory:  Negative for cough, hemoptysis, shortness of breath and wheezing.    Cardiovascular:  Negative for chest pain, palpitations and leg swelling.   Gastrointestinal:  Negative for abdominal pain, constipation, diarrhea, heartburn, nausea and vomiting.   Genitourinary:  Negative for dysuria, frequency and hematuria.   Musculoskeletal:  Positive for back pain. Negative for falls, joint pain and myalgias.        Chronic   Skin:  Negative for itching and rash.   Neurological:  Negative for dizziness, sensory change, focal weakness, seizures and headaches.   Endo/Heme/Allergies:  Negative for environmental allergies. Does not bruise/bleed easily.   Psychiatric/Behavioral:  Positive for depression. Negative for suicidal ideas. The patient is nervous/anxious.        Objective:     Vitals:  Vitals:    12/12/23 1309   BP: 109/75   Pulse: 80   Resp: 18   Temp: 98.2 °F (36.8 °C)       Body mass index is 27.02 kg/m².     Physical Exam:  General: The patient is awake, alert and oriented times three. The patient is well nourished and in no acute distress.  Neck: There is no evidence of palpable cervical, supraclavicular or axillary adenopathy. The neck is supple. The thyroid is not enlarged.  Musculoskeletal: The patient has a normal range of motion of her bilateral upper extremities.  Chest: Examination of the chest wall fails to reveal any obvious abnormalities. Nonlabored breathing, symmetric expansion.  Breast:  Left: Well healed excisional biopsy scar in the lower outer quadrant. No swelling or palpable fluid collection at surgery site. There is palpable scar tissue present but no dominant mass or area of focal nodularity. The nipple is everted without evidence of discharge. There is no skin dimpling with movement of the pectoralis. There are no significant skin changes overlying the breast.  Abdomen: The abdomen is  soft, flat, nontender and nondistended.  Integumentary: no rashes or skin lesions present  Neurologic: cranial nerves intact, no signs of peripheral neurological deficit, motor/sensory function intact    Assessment and Plan:     Patient Active Problem List   Diagnosis    Benign essential hypertension    Degeneration of intervertebral disc of cervical region    Hyperlipidemia    Heart valve disease    Osteoarthritis    Positive antinuclear antibody    Adolescent idiopathic scoliosis of thoracolumbar region    History of kidney stones    Atypical ductal hyperplasia of left breast    Acute cystitis with hematuria    Other chest pain      There are no diagnoses linked to this encounter.    Fibrocystic breast change is a common noncancerous condition that affects mostly premenopausal women. Fibrocystic breast changes encompass a wide variety of symptoms, including breast tenderness or discomfort, the sudden appearance or disappearance of palpable benign masses in the breast, or lumpy, free-moving masses in the breast. Fibrocystic breast changes are most common in women ages 20 to 50. It is extremely rare in women past menopause not receiving hormonal replacement. It affects an estimated 50% of women between the ages of 20 and 50. Fibrocystic changes are the most frequent lesion of the breast.     Drinking alcohol may increase the risk of fibrocystic breast changes, especially in young women between 18 and 22 years of age. Caffeine is also thought to contribute to the severity of fibrocystic breast changes and pain. The exact mechanism is unclear, fibrocystic breast changes are believed to be caused by fluctuating levels of hormones, especially estrogen, during the menstrual cycle.     Fibrocystic breast changes encompass a wide variety of symptoms including:   - Breast tenderness or discomfort, which can worsen during the immediate premenstrual period.   - The sudden appearance or disappearance of palpable benign masses  in the breast.   - Lumpy, free-moving masses in the breast, often near the armpit.     In the majority of cases, no treatment is needed. Those suffering pain or discomfort due to fibrocystic breast changes are advised to wear a good, supportive bra both day and night when symptoms are worse. It is also advised to avoid contact sports and other activities which could cause injury to the breasts. It is also advised to avoid caffeine consumption: coffee, tea, soda, and chocolate can all contribute to the pain. Sometimes, the cyst can be completely aspirated through the needle during the biopsy, but future masses can occur in patients with fibrocystic breast changes.    What is breast pain?  Breast pain (mastalgia) is a common type of discomfort among women, affecting as many as seven in 10 women at some point in their lives.  About 10 percent of women have moderate to severe breast pain more than five days a month. In some cases, severe breast pain lasts throughout the menstrual cycles.  The symptom occurs most frequently in young, premenopausal and perimenopausal women.  Breast pain alone rarely signifies breast cancer. Still, if you have unexplained breast pain that persists, get checked by your provider.     Causes of breast pain  Most of the time, it's not possible to identify the exact cause of breast pain. Likely contributors are hormonal changes, anatomical issues (breast trauma, prior breast surgery, or chest wall pain), breast size, and medication use (oral contraceptives, infertility treatments, estrogen/progesterone hormone therapy, and some antidepressants).     Treatments and supplements for breast pain  Use hot or cold compresses on your breasts.  Wear a firm support bra, fitted by a professional if possible.  Wear a sports bra during exercise and while sleeping, especially when your breast may be more sensitive.  Limit or eliminate caffeine, a dietary change many women swear by.  Decrease the fat in your  diet.  Use a pain reliever, such as acetaminophen (Tylenol) or ibuprofen (Advil, Motrin) to alleviate breast pain.  May try Voltaren gel topical applications (2 g up to 4 times per day) to affected area of the breast.  Keep a journal noting when you experience breast pain and other symptoms, to determine if your pain is cyclic.  Vitamin E can help with breast pain (up to1,200 IU per day). Many women have found it to be helpful, but it usually takes up to 2 months of taking this to see a change.  Evening primrose oil; this supplement appears to change the balance of fatty acids in your cells, which may reduce breast pain. You can take a 1000 mg capsule up to three times a day.     Adopted from [email or web address]      Plan:     Continue annual screening with supplemental breast MRIs. Will order for May to establish 6 month interval from bilateral mammography. RTC after for high risk follow up. She would like to transition to annual visits once a 6 month interval between imaging is re-established.    I discussed ways to reduce breast pain/tenderness. Avoid caffeine (coffee, teas, chocolate, sodas). Drinking alcohol may also increase the risk of fibrocystic changes and breast pain. I also advised to wear a good, supportive bra both day and night when symptoms are worse. Avoid contact sports and other activities which could cause injury to the breasts. She may take Tylenol, NSAIDS, or apply Voltaren gel when the pain is worse.     She met with Dr. Dee and declined chemoprevention with AI or Tamoxifen. She was noted to have a low BMD, and Dr. Dailey started her on Evista, which treats low BMD and also is shown to decrease risk of invasive breast cancer in women at high risk although less effective than Tamoxifen. She did not tolerate the medication well so it was discontinued. She elected to continue high risk screening alone.        All of her questions were answered. She was advised to call if she develops any  questions or concerns.    Niyah Saunders PA-C          Total time on the date of the visit ranged from 30-39 mins (68748). Total time includes both face-to-face and non-face-to-face time personally spent by myself on the day of the visit.    Non-face-to-face time included:  _X_ preparing to see the patient such as reviewing the patient record  __ obtaining and reviewing separately obtained history  _X_ independently interpreting results  _X_ documenting clinical information in electronic health record.    Face-to-face time included:  _X_ performing an appropriate history and examination  _X_ communicating results to the patient  _X_ counseling and educating the patient  __ ordering appropriate medications  _x_ ordering appropriate tests  _X_ ordering appropriate procedures (including follow-up)  _X_ answering any questions the patient had    Total Time spent on date of visit: 35 minutes

## 2023-12-12 ENCOUNTER — OFFICE VISIT (OUTPATIENT)
Dept: SURGERY | Facility: CLINIC | Age: 63
End: 2023-12-12
Payer: MEDICARE

## 2023-12-12 VITALS
RESPIRATION RATE: 18 BRPM | TEMPERATURE: 98 F | OXYGEN SATURATION: 98 % | HEART RATE: 80 BPM | WEIGHT: 143 LBS | BODY MASS INDEX: 27 KG/M2 | SYSTOLIC BLOOD PRESSURE: 109 MMHG | DIASTOLIC BLOOD PRESSURE: 75 MMHG | HEIGHT: 61 IN

## 2023-12-12 DIAGNOSIS — Z12.31 SCREENING MAMMOGRAM, ENCOUNTER FOR: Primary | ICD-10-CM

## 2023-12-12 DIAGNOSIS — Z91.89 AT HIGH RISK FOR BREAST CANCER: ICD-10-CM

## 2023-12-12 DIAGNOSIS — N60.92 ATYPICAL DUCTAL HYPERPLASIA OF LEFT BREAST: ICD-10-CM

## 2023-12-12 PROCEDURE — 99214 OFFICE O/P EST MOD 30 MIN: CPT | Mod: S$GLB,,, | Performed by: PHYSICIAN ASSISTANT

## 2023-12-12 PROCEDURE — 1160F RVW MEDS BY RX/DR IN RCRD: CPT | Mod: CPTII,S$GLB,, | Performed by: PHYSICIAN ASSISTANT

## 2023-12-12 PROCEDURE — 3074F PR MOST RECENT SYSTOLIC BLOOD PRESSURE < 130 MM HG: ICD-10-PCS | Mod: CPTII,S$GLB,, | Performed by: PHYSICIAN ASSISTANT

## 2023-12-12 PROCEDURE — 1159F PR MEDICATION LIST DOCUMENTED IN MEDICAL RECORD: ICD-10-PCS | Mod: CPTII,S$GLB,, | Performed by: PHYSICIAN ASSISTANT

## 2023-12-12 PROCEDURE — 3008F BODY MASS INDEX DOCD: CPT | Mod: CPTII,S$GLB,, | Performed by: PHYSICIAN ASSISTANT

## 2023-12-12 PROCEDURE — 4010F ACE/ARB THERAPY RXD/TAKEN: CPT | Mod: CPTII,S$GLB,, | Performed by: PHYSICIAN ASSISTANT

## 2023-12-12 PROCEDURE — 3074F SYST BP LT 130 MM HG: CPT | Mod: CPTII,S$GLB,, | Performed by: PHYSICIAN ASSISTANT

## 2023-12-12 PROCEDURE — 3008F PR BODY MASS INDEX (BMI) DOCUMENTED: ICD-10-PCS | Mod: CPTII,S$GLB,, | Performed by: PHYSICIAN ASSISTANT

## 2023-12-12 PROCEDURE — 99214 PR OFFICE/OUTPT VISIT, EST, LEVL IV, 30-39 MIN: ICD-10-PCS | Mod: S$GLB,,, | Performed by: PHYSICIAN ASSISTANT

## 2023-12-12 PROCEDURE — 1159F MED LIST DOCD IN RCRD: CPT | Mod: CPTII,S$GLB,, | Performed by: PHYSICIAN ASSISTANT

## 2023-12-12 PROCEDURE — 99999 PR PBB SHADOW E&M-EST. PATIENT-LVL V: CPT | Mod: PBBFAC,,, | Performed by: PHYSICIAN ASSISTANT

## 2023-12-12 PROCEDURE — 99999 PR PBB SHADOW E&M-EST. PATIENT-LVL V: ICD-10-PCS | Mod: PBBFAC,,, | Performed by: PHYSICIAN ASSISTANT

## 2023-12-12 PROCEDURE — 1160F PR REVIEW ALL MEDS BY PRESCRIBER/CLIN PHARMACIST DOCUMENTED: ICD-10-PCS | Mod: CPTII,S$GLB,, | Performed by: PHYSICIAN ASSISTANT

## 2023-12-12 PROCEDURE — 4010F PR ACE/ARB THEARPY RXD/TAKEN: ICD-10-PCS | Mod: CPTII,S$GLB,, | Performed by: PHYSICIAN ASSISTANT

## 2023-12-12 PROCEDURE — 3078F PR MOST RECENT DIASTOLIC BLOOD PRESSURE < 80 MM HG: ICD-10-PCS | Mod: CPTII,S$GLB,, | Performed by: PHYSICIAN ASSISTANT

## 2023-12-12 PROCEDURE — 3078F DIAST BP <80 MM HG: CPT | Mod: CPTII,S$GLB,, | Performed by: PHYSICIAN ASSISTANT

## 2023-12-12 RX ORDER — CYCLOSPORINE 0.5 MG/ML
1 EMULSION OPHTHALMIC 2 TIMES DAILY
COMMUNITY
Start: 2023-12-07

## 2023-12-12 NOTE — PROGRESS NOTES
What is breast pain?  Breast pain (mastalgia) is a common type of discomfort among women, affecting as many as seven in 10 women at some point in their lives.  About 10 percent of women have moderate to severe breast pain more than five days a month. In some cases, severe breast pain lasts throughout the menstrual cycles.  The symptom occurs most frequently in young, premenopausal and perimenopausal women.  Breast pain alone rarely signifies breast cancer. Still, if you have unexplained breast pain that persists, get checked by your provider.     Causes of breast pain  Most of the time, it's not possible to identify the exact cause of breast pain. Likely contributors are hormonal changes, anatomical issues (breast trauma, prior breast surgery, or chest wall pain), breast size, and medication use (oral contraceptives, infertility treatments, estrogen/progesterone hormone therapy, and some antidepressants).     Treatments and supplements for breast pain  Use hot or cold compresses on your breasts.  Wear a firm support bra, fitted by a professional if possible.  Wear a sports bra during exercise and while sleeping, especially when your breast may be more sensitive.  Limit or eliminate caffeine, a dietary change many women swear by.  Decrease the fat in your diet.  Use a pain reliever, such as acetaminophen (Tylenol) or ibuprofen (Advil, Motrin) to alleviate breast pain.  May try Voltaren gel topical applications (2 g up to 4 times per day) to affected area of the breast.  Keep a journal noting when you experience breast pain and other symptoms, to determine if your pain is cyclic.  Vitamin E can help with breast pain (up to1,200 IU per day). Many women have found it to be helpful, but it usually takes up to 2 months of taking this to see a change.  Evening primrose oil; this supplement appears to change the balance of fatty acids in your cells, which may reduce breast pain. You can take a 1000 mg capsule up to three  times a day.     Adopted from [email or web address]

## 2023-12-12 NOTE — PATIENT INSTRUCTIONS
What is breast pain?  Breast pain (mastalgia) is a common type of discomfort among women, affecting as many as seven in 10 women at some point in their lives.  About 10 percent of women have moderate to severe breast pain more than five days a month. In some cases, severe breast pain lasts throughout the menstrual cycles.  The symptom occurs most frequently in young, premenopausal and perimenopausal women.  Breast pain alone rarely signifies breast cancer. Still, if you have unexplained breast pain that persists, get checked by your provider.     Causes of breast pain  Most of the time, it's not possible to identify the exact cause of breast pain. Likely contributors are hormonal changes, anatomical issues (breast trauma, prior breast surgery, or chest wall pain), breast size, and medication use (oral contraceptives, infertility treatments, estrogen/progesterone hormone therapy, and some antidepressants).     Treatments and supplements for breast pain  Use hot or cold compresses on your breasts.  Wear a firm support bra, fitted by a professional if possible.  Wear a sports bra during exercise and while sleeping, especially when your breast may be more sensitive.  Limit or eliminate caffeine, a dietary change many women swear by.  Decrease the fat in your diet.  Use a pain reliever, such as acetaminophen (Tylenol) or ibuprofen (Advil, Motrin) to alleviate breast pain.  May try Voltaren gel topical applications (2 g up to 4 times per day) to affected area of the breast.  Keep a journal noting when you experience breast pain and other symptoms, to determine if your pain is cyclic.  Vitamin E can help with breast pain (up to1,200 IU per day). Many women have found it to be helpful, but it usually takes up to 2 months of taking this to see a change.  Evening primrose oil; this supplement appears to change the balance of fatty acids in your cells, which may reduce breast pain. You can take a 1000 mg capsule up to three  times a day.     Adopted from [email or web address]

## 2024-01-03 ENCOUNTER — OFFICE VISIT (OUTPATIENT)
Dept: URGENT CARE | Facility: CLINIC | Age: 64
End: 2024-01-03
Payer: MEDICARE

## 2024-01-03 VITALS
BODY MASS INDEX: 26.24 KG/M2 | OXYGEN SATURATION: 97 % | SYSTOLIC BLOOD PRESSURE: 121 MMHG | WEIGHT: 139 LBS | DIASTOLIC BLOOD PRESSURE: 69 MMHG | HEART RATE: 103 BPM | HEIGHT: 61 IN | TEMPERATURE: 99 F | RESPIRATION RATE: 18 BRPM

## 2024-01-03 DIAGNOSIS — J02.9 PHARYNGITIS, UNSPECIFIED ETIOLOGY: ICD-10-CM

## 2024-01-03 DIAGNOSIS — M54.50 LOW BACK PAIN, UNSPECIFIED BACK PAIN LATERALITY, UNSPECIFIED CHRONICITY, UNSPECIFIED WHETHER SCIATICA PRESENT: ICD-10-CM

## 2024-01-03 DIAGNOSIS — R30.0 DYSURIA: ICD-10-CM

## 2024-01-03 DIAGNOSIS — J02.9 SORE THROAT: Primary | ICD-10-CM

## 2024-01-03 DIAGNOSIS — J34.89 NASAL LESION: ICD-10-CM

## 2024-01-03 LAB
BILIRUB UR QL STRIP: NEGATIVE
CTP QC/QA: YES
GLUCOSE UR QL STRIP: NEGATIVE
KETONES UR QL STRIP: NEGATIVE
LEUKOCYTE ESTERASE UR QL STRIP: NEGATIVE
MOLECULAR STREP A: NEGATIVE
PH, POC UA: 5
POC BLOOD, URINE: NEGATIVE
POC NITRATES, URINE: NEGATIVE
PROT UR QL STRIP: NEGATIVE
SP GR UR STRIP: 1.02 (ref 1–1.03)
UROBILINOGEN UR STRIP-ACNC: NORMAL (ref 0.1–1.1)

## 2024-01-03 PROCEDURE — 96372 THER/PROPH/DIAG INJ SC/IM: CPT | Mod: ,,, | Performed by: NURSE PRACTITIONER

## 2024-01-03 PROCEDURE — 99213 OFFICE O/P EST LOW 20 MIN: CPT | Mod: 25,,, | Performed by: NURSE PRACTITIONER

## 2024-01-03 PROCEDURE — 81003 URINALYSIS AUTO W/O SCOPE: CPT | Mod: QW,,, | Performed by: NURSE PRACTITIONER

## 2024-01-03 PROCEDURE — 87651 STREP A DNA AMP PROBE: CPT | Mod: QW,,, | Performed by: NURSE PRACTITIONER

## 2024-01-03 RX ORDER — SULFAMETHOXAZOLE AND TRIMETHOPRIM 800; 160 MG/1; MG/1
1 TABLET ORAL 2 TIMES DAILY
Qty: 20 TABLET | Refills: 0 | Status: SHIPPED | OUTPATIENT
Start: 2024-01-03 | End: 2024-01-13

## 2024-01-03 RX ORDER — DEXAMETHASONE SODIUM PHOSPHATE 100 MG/10ML
10 INJECTION INTRAMUSCULAR; INTRAVENOUS ONCE
Status: COMPLETED | OUTPATIENT
Start: 2024-01-03 | End: 2024-01-03

## 2024-01-03 RX ORDER — MUPIROCIN 20 MG/G
OINTMENT TOPICAL 3 TIMES DAILY
Qty: 15 G | Refills: 0 | Status: SHIPPED | OUTPATIENT
Start: 2024-01-03

## 2024-01-03 RX ADMIN — DEXAMETHASONE SODIUM PHOSPHATE 10 MG: 100 INJECTION INTRAMUSCULAR; INTRAVENOUS at 06:01

## 2024-01-04 NOTE — PROGRESS NOTES
"Subjective:      Patient ID: Eusebia Miramontes is a 63 y.o. female.    Vitals:  height is 5' 1" (1.549 m) and weight is 63 kg (139 lb). Her oral temperature is 98.5 °F (36.9 °C). Her blood pressure is 121/69 and her pulse is 103. Her respiration is 18 and oxygen saturation is 97%.     Chief Complaint: Sore Throat (64 y/o female presents to urgent care with c/o sore throat and earache for 2 days now. Reports possible UTI. States she has lower pressure on back)    63-year-old female presents with nasal congestion, throat discomfort, nasal irritation and lesion to the right nare, intermittent low back discomfort without recent injury or trauma.  No dysuria, hematuria       HENT:  Positive for congestion, sinus pressure and sore throat.    Musculoskeletal:  Positive for back pain.      Objective:     Physical Exam   Constitutional: She is oriented to person, place, and time. She appears well-developed. She is cooperative.  Non-toxic appearance. She does not appear ill. No distress.   HENT:   Head: Normocephalic and atraumatic.   Ears:   Right Ear: Hearing, tympanic membrane, external ear and ear canal normal.   Left Ear: Hearing, tympanic membrane, external ear and ear canal normal.   Nose: Nose normal. No mucosal edema, rhinorrhea or nasal deformity. No epistaxis. Right sinus exhibits no maxillary sinus tenderness and no frontal sinus tenderness. Left sinus exhibits no maxillary sinus tenderness and no frontal sinus tenderness.   Mouth/Throat: Uvula is midline, oropharynx is clear and moist and mucous membranes are normal. No trismus in the jaw. Normal dentition. No uvula swelling. No oropharyngeal exudate, posterior oropharyngeal edema or posterior oropharyngeal erythema.   Mild erythema noted to the right Nare.  Patient states she feels discomfort and has had nasal lesions in the past      Comments: Mild erythema noted to the right Nare.  Patient states she feels discomfort and has had nasal lesions in the past  Eyes: " Conjunctivae and lids are normal. No scleral icterus.   Neck: Trachea normal and phonation normal. Neck supple. No edema present. No erythema present. No neck rigidity present.   Cardiovascular: Normal rate, regular rhythm, normal heart sounds and normal pulses.   Pulmonary/Chest: Effort normal and breath sounds normal. No respiratory distress. She has no decreased breath sounds. She has no rhonchi.   Abdominal: Normal appearance.   Musculoskeletal: Normal range of motion.         General: No deformity. Normal range of motion.   Neurological: She is alert and oriented to person, place, and time. She exhibits normal muscle tone. Coordination normal.   Skin: Skin is warm, dry, intact, not diaphoretic and not pale.   Psychiatric: Her speech is normal and behavior is normal. Judgment and thought content normal.   Nursing note and vitals reviewed.      Assessment:     1. Sore throat    2. Dysuria    3. Low back pain, unspecified back pain laterality, unspecified chronicity, unspecified whether sciatica present    4. Pharyngitis, unspecified etiology    5. Nasal lesion      Office Visit on 01/03/2024   Component Date Value Ref Range Status    Molecular Strep A, POC 01/03/2024 Negative  Negative Final     Acceptable 01/03/2024 Yes   Final    POC Blood, Urine 01/03/2024 Negative  Negative, Positive Slide, Positive Tube Final    POC Bilirubin, Urine 01/03/2024 Negative  Negative, Positive Slide, Positive Tube Final    POC Urobilinogen, Urine 01/03/2024 normal  0.1 - 1.1 Final    POC Ketones, Urine 01/03/2024 Negative  Negative, Positive Slide, Positive Tube Final    POC Protein, Urine 01/03/2024 Negative  Negative, Positive Slide, Positive Tube Final    POC Nitrates, Urine 01/03/2024 Negative  Negative, Positive Slide, Positive Tube Final    POC Glucose, Urine 01/03/2024 Negative  Negative, Positive Slide, Positive Tube Final    pH, UA 01/03/2024 5   Final    POC Specific Gravity, Urine 01/03/2024 1.020  1.003  - 1.029 Final    POC Leukocytes, Urine 01/03/2024 Negative  Negative, Positive Slide, Positive Tube Final       Plan:   Pharyngitis  Warm saltwater gargles 3 times a day as instructed  Nasal saline, Flonase, Claritin OTC as directed  Increase oral fluids  Ibuprofen or Tylenol as directed for pain or fever  Please follow up with your primary care provider within 2-5 days if your signs and symptoms have not resolved or worsen.      Nasal lesion  Nasal saline as directed  Apply mupirocin ointment as directed  Take prescription Bactrim as directed  Follow-up with your primary care provider or return here for concerns    Sore throat  -     POCT Strep A, Molecular    Dysuria    Low back pain, unspecified back pain laterality, unspecified chronicity, unspecified whether sciatica present  -     POCT Urinalysis, Dipstick, Automated, W/O Scope    Pharyngitis, unspecified etiology  -     dexAMETHasone injection 10 mg    Nasal lesion  -     mupirocin (BACTROBAN) 2 % ointment; Apply topically 3 (three) times daily.  Dispense: 15 g; Refill: 0  -     sulfamethoxazole-trimethoprim 800-160mg (BACTRIM DS) 800-160 mg Tab; Take 1 tablet by mouth 2 (two) times daily. for 10 days  Dispense: 20 tablet; Refill: 0

## 2024-01-04 NOTE — PATIENT INSTRUCTIONS
Pharyngitis  Warm saltwater gargles 3 times a day as instructed  Nasal saline, Flonase, Claritin OTC as directed  Increase oral fluids  Ibuprofen or Tylenol as directed for pain or fever  Please follow up with your primary care provider within 2-5 days if your signs and symptoms have not resolved or worsen.      Nasal lesion  Nasal saline as directed  Apply mupirocin ointment as directed  Take prescription Bactrim as directed  Follow-up with your primary care provider or return here for concerns

## 2024-01-15 ENCOUNTER — OFFICE VISIT (OUTPATIENT)
Dept: URGENT CARE | Facility: CLINIC | Age: 64
End: 2024-01-15
Payer: MEDICARE

## 2024-01-15 VITALS
TEMPERATURE: 98 F | RESPIRATION RATE: 18 BRPM | OXYGEN SATURATION: 97 % | WEIGHT: 139 LBS | HEART RATE: 89 BPM | DIASTOLIC BLOOD PRESSURE: 76 MMHG | BODY MASS INDEX: 26.24 KG/M2 | HEIGHT: 61 IN | SYSTOLIC BLOOD PRESSURE: 116 MMHG

## 2024-01-15 DIAGNOSIS — M17.12 PRIMARY OSTEOARTHRITIS OF LEFT KNEE: Primary | ICD-10-CM

## 2024-01-15 DIAGNOSIS — M25.562 ACUTE PAIN OF LEFT KNEE: ICD-10-CM

## 2024-01-15 PROCEDURE — 96372 THER/PROPH/DIAG INJ SC/IM: CPT | Mod: ,,, | Performed by: FAMILY MEDICINE

## 2024-01-15 PROCEDURE — 99213 OFFICE O/P EST LOW 20 MIN: CPT | Mod: 25,,, | Performed by: FAMILY MEDICINE

## 2024-01-15 RX ORDER — KETOROLAC TROMETHAMINE 10 MG/1
10 TABLET, FILM COATED ORAL EVERY 6 HOURS
Qty: 20 TABLET | Refills: 0 | Status: SHIPPED | OUTPATIENT
Start: 2024-01-15 | End: 2024-01-20

## 2024-01-15 RX ORDER — KETOROLAC TROMETHAMINE 30 MG/ML
30 INJECTION, SOLUTION INTRAMUSCULAR; INTRAVENOUS
Status: COMPLETED | OUTPATIENT
Start: 2024-01-15 | End: 2024-01-15

## 2024-01-15 RX ORDER — PREDNISONE 20 MG/1
40 TABLET ORAL DAILY
Qty: 10 TABLET | Refills: 0 | Status: SHIPPED | OUTPATIENT
Start: 2024-01-15 | End: 2024-01-20

## 2024-01-15 RX ADMIN — KETOROLAC TROMETHAMINE 30 MG: 30 INJECTION, SOLUTION INTRAMUSCULAR; INTRAVENOUS at 11:01

## 2024-01-15 NOTE — PROGRESS NOTES
Patient ID: [id number]     Chief Complaint:  Knee pain, sore throat and ear pain    History of Present Illness:     Eusebia Miramontes is a 63 y.o. female  degenerative disc disease, hypertension, heart valve disease, osteoarthritis, who presents today for symptoms of Knee Pain (Pt presents to clinic with c/o left knee pain/swelling radiating to right knee  starting a week ago. Pt states pain started after jumping out of bed. Pt has tried tylenol- no relief. Pt was seen in clinic on 01/03, pt is still suffering with sore throat/ear pain. )    Left knee:  She has a history of left knee pain that causes diffuse tenderness and swells up at the end of the day.  The last episode was her months to years ago and resolve on its own.  This episode occurred after she jumped out of bed about a week ago.  The entire left knee circumferentially is tender to the touch.  Last imaging 2014.    Sore throat:  Seen 2 weeks ago for sore throat, given 10 mg of dexamethasone.  Reports continues to have right-sided sore throat pain worse at night.  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    Right wrist pain: No known injury, started about a week ago, just feels inflamed all over, never occurred before.      Past Medical History:     ----------------------------  Breast cyst      Comment:  left breast  Fibromyalgia  GERD (gastroesophageal reflux disease)  Hyperlipidemia  Hypertension  Systemic lupus erythematosus, organ or system involvement unspecified     Past Surgical History:   Procedure Laterality Date    ADENOIDECTOMY      BILATERAL TUBAL LIGATION      BREAST SURGERY Left     EXCISION, MASS, BREAST, USING RADIOLOGICAL MARKER Left 01/13/2023    Procedure: EXCISION,MASS,BREAST,USING RADIOLOGICAL MARKER Left;  Surgeon: Carmen Gomez MD;  Location: [facility];  Service: General;  Laterality: Left;    PARATHYROIDECTOMY      TONSILLECTOMY      TUBAL LIGATION  3/3/1997       Review of patient's  allergies indicates:  No Known Allergies    Outpatient Medications Marked as Taking for the 1/15/24 encounter (Office Visit) with Alejandro Rodriguez MD   Medication Sig Dispense Refill    clorazepate (TRANXENE) 15 MG tablet Take 1 tablet (15 mg total) by mouth nightly as needed for Insomnia. 15 tablet 5    cyanocobalamin (VITAMIN B-12) 100 MCG tablet Take 100 mcg by mouth once daily.      DULoxetine (CYMBALTA) 60 MG capsule Take 1 capsule (60 mg total) by mouth once daily. 30 capsule 11    fluticasone propionate (FLONASE) 50 mcg/actuation nasal spray 1 spray by Each Nostril route 2 (two) times daily.      levocetirizine (XYZAL) 5 MG tablet Take 5 mg by mouth every evening.      loratadine (CLARITIN) 10 mg tablet Take 10 mg by mouth once daily.      metaxalone (SKELAXIN) 800 MG tablet Take 1 tablet (800 mg total) by mouth once daily. 30 tablet 11    montelukast (SINGULAIR) 10 mg tablet Take 10 mg by mouth every evening.      mupirocin (BACTROBAN) 2 % ointment Apply topically 3 (three) times daily. 15 g 0    olmesartan (BENICAR) 20 MG tablet Take 1 tablet (20 mg total) by mouth once daily. 30 tablet 11    pantoprazole (PROTONIX) 40 MG tablet Take 40 mg by mouth 2 (two) times daily.      RESTASIS 0.05 % ophthalmic emulsion Place 1 drop into both eyes 2 (two) times daily.      vitamin D (VITAMIN D3) 1000 units Tab Take 1,000 Units by mouth once daily.       Current Facility-Administered Medications for the 1/15/24 encounter (Office Visit) with Alejandro Rodriguez MD   Medication Dose Route Frequency Provider Last Rate Last Admin    [COMPLETED] ketorolac injection 30 mg  30 mg Intramuscular 1 time in Clinic/HOD Alejandro Rodriguez MD   30 mg at 01/15/24 1102       Social History     Socioeconomic History    Marital status: Legally    Tobacco Use    Smoking status: Former     Current packs/day: 0.00     Average packs/day: 0.3 packs/day for 5.0 years (1.3 ttl pk-yrs)     Types: Cigarettes     Start date: 11/24/1985     Quit  date: 1990     Years since quittin.1    Smokeless tobacco: Never    Tobacco comments:     Only smoked a pack per week only smoked if I was out   Substance and Sexual Activity    Alcohol use: Not Currently     Comment: socially    Drug use: Not Currently     Types: Marijuana    Sexual activity: Not Currently     Partners: Male     Birth control/protection: None        Family History   Problem Relation Age of Onset    Dementia Mother     Arthritis Mother         Parkisons and dementia    Stomach cancer Maternal Aunt         diagnosed in her late 70s    Leukemia Paternal Uncle     Arthritis Father         Kidney Failure,Heart problems,COPD,    Birth defects Son         Intestines twisted cause of death also he  had cerebral Palsy        Subjective:     Review of Systems   Constitutional:  Negative for chills, fever and malaise/fatigue.   HENT:  Positive for ear pain and sore throat. Negative for congestion, ear discharge and sinus pain.    Respiratory:  Negative for cough, sputum production, shortness of breath, wheezing and stridor.    Gastrointestinal:  Negative for abdominal pain, diarrhea, nausea and vomiting.   Genitourinary:  Negative for dysuria, frequency and urgency.   Musculoskeletal:  Positive for joint pain. Negative for neck pain.   Skin:  Negative for rash.   Neurological:  Negative for headaches.       Objective:     Vitals:    01/15/24 1026   BP: 116/76   Pulse: 89   Resp: 18   Temp: 98.4 °F (36.9 °C)     Body mass index is 26.26 kg/m².    Physical Exam  Constitutional:       General: She is not in acute distress.     Appearance: Normal appearance. She is not ill-appearing or toxic-appearing.   HENT:      Head: Normocephalic and atraumatic.   Cardiovascular:      Rate and Rhythm: Normal rate and regular rhythm.   Pulmonary:      Effort: Pulmonary effort is normal.      Breath sounds: Normal breath sounds.   Musculoskeletal:         General: Tenderness present. No swelling, deformity or signs  of injury.      Cervical back: No rigidity or tenderness.      Right lower leg: No edema.      Left lower leg: No edema.      Comments: Left knee:  No obvious deformity but patient reports she feels it is swollen  No skin change  Range of motion is normal  Motor and sensory is normal  The entirety of the knee, circumferentially around the left knee, is tender.  The entire patella is tender, the entire joint line is tender, the entire tibia and fibula is circumferentially tender.  Cap refill is normal    Right wrist:  Nontender, range of motion normal, cap refill normal, motor and sensory normal   Lymphadenopathy:      Cervical: No cervical adenopathy.   Neurological:      General: No focal deficit present.      Mental Status: She is alert and oriented to person, place, and time.      Cranial Nerves: No cranial nerve deficit.      Sensory: No sensory deficit.      Motor: No weakness.      Coordination: Coordination normal.      Gait: Gait normal.      Deep Tendon Reflexes: Reflexes normal.   Psychiatric:         Mood and Affect: Mood normal.         Behavior: Behavior normal.         Thought Content: Thought content normal.         Assessment & Plan:       ICD-10-CM ICD-9-CM   1. Primary osteoarthritis of left knee  M17.12 715.16   2. Acute pain of left knee  M25.562 719.46        1. Primary osteoarthritis of left knee  -     HME - OTHER    2. Acute pain of left knee  -     XR KNEE 3 VIEW LEFT; Future; Expected date: 01/15/2024    Other orders  -     predniSONE (DELTASONE) 20 MG tablet; Take 2 tablets (40 mg total) by mouth once daily. for 5 days  Dispense: 10 tablet; Refill: 0  -     ketorolac (TORADOL) 10 mg tablet; Take 1 tablet (10 mg total) by mouth every 6 (six) hours. for 5 days  Dispense: 20 tablet; Refill: 0  -     ketorolac injection 30 mg       Throat exam normal, right wrist exam normal.    X-ray reviewed by me does not reveal any acute bony abnormalities but we will await formal x-ray report let patient  know.  We will treat as osteoarthritis acute inflammation episode was Toradol and steroids.  This should also help the sore throat and right wrist.    We will also give her crutches with which to ambulate.  The crutches are being prescribed for her acute flare of left knee osteoarthritis.  The crutches will allow her to ambulate on her own without the assistance of others, which she currently is not really able to do.  Crutches will also reduce impact on the knee from ambulating which will allow the inflammation to improve quicker than with chronic use.

## 2024-01-15 NOTE — PATIENT INSTRUCTIONS
Toradol every 6 hours as needed for pain and inflammation    Prednisone once daily for 5 days for inflammation and pain    Follow-up in 5 days if pain is not significantly improved, follow up sooner if pain worsens    Go to the emergency room if you develop intractable pain or a substantial change in your symptoms

## 2024-02-27 RX ORDER — CLORAZEPATE DIPOTASSIUM 15 MG/1
15 TABLET ORAL NIGHTLY PRN
Qty: 15 TABLET | Refills: 5 | Status: SHIPPED | OUTPATIENT
Start: 2024-02-27

## 2024-05-07 LAB
PAP RECOMMENDATION EXT: NORMAL
PAP SMEAR: NORMAL

## 2024-05-15 ENCOUNTER — OFFICE VISIT (OUTPATIENT)
Dept: INTERNAL MEDICINE | Facility: CLINIC | Age: 64
End: 2024-05-15
Payer: MEDICARE

## 2024-05-15 ENCOUNTER — LAB VISIT (OUTPATIENT)
Dept: LAB | Facility: HOSPITAL | Age: 64
End: 2024-05-15
Attending: NURSE PRACTITIONER
Payer: MEDICARE

## 2024-05-15 VITALS
BODY MASS INDEX: 26.43 KG/M2 | WEIGHT: 140 LBS | HEIGHT: 61 IN | SYSTOLIC BLOOD PRESSURE: 108 MMHG | RESPIRATION RATE: 20 BRPM | OXYGEN SATURATION: 96 % | DIASTOLIC BLOOD PRESSURE: 72 MMHG | HEART RATE: 72 BPM

## 2024-05-15 DIAGNOSIS — F41.9 ANXIETY AND DEPRESSION: ICD-10-CM

## 2024-05-15 DIAGNOSIS — M06.9 RHEUMATOID ARTHRITIS, INVOLVING UNSPECIFIED SITE, UNSPECIFIED WHETHER RHEUMATOID FACTOR PRESENT: ICD-10-CM

## 2024-05-15 DIAGNOSIS — G89.29 CHRONIC NECK PAIN: ICD-10-CM

## 2024-05-15 DIAGNOSIS — R53.83 OTHER FATIGUE: ICD-10-CM

## 2024-05-15 DIAGNOSIS — F32.A ANXIETY AND DEPRESSION: ICD-10-CM

## 2024-05-15 DIAGNOSIS — E21.3 HYPERPARATHYROIDISM, UNSPECIFIED: ICD-10-CM

## 2024-05-15 DIAGNOSIS — M06.4 INFLAMMATORY POLYARTHROPATHY: ICD-10-CM

## 2024-05-15 DIAGNOSIS — M54.50 CHRONIC BILATERAL LOW BACK PAIN WITHOUT SCIATICA: ICD-10-CM

## 2024-05-15 DIAGNOSIS — J01.80 ACUTE NON-RECURRENT SINUSITIS OF OTHER SINUS: Primary | ICD-10-CM

## 2024-05-15 DIAGNOSIS — G89.29 CHRONIC BILATERAL LOW BACK PAIN WITHOUT SCIATICA: ICD-10-CM

## 2024-05-15 DIAGNOSIS — M54.2 CHRONIC NECK PAIN: ICD-10-CM

## 2024-05-15 DIAGNOSIS — I10 PRIMARY HYPERTENSION: ICD-10-CM

## 2024-05-15 LAB
ALBUMIN SERPL-MCNC: 4.2 G/DL (ref 3.4–4.8)
ALBUMIN/GLOB SERPL: 1.5 RATIO (ref 1.1–2)
ALP SERPL-CCNC: 74 UNIT/L (ref 40–150)
ALT SERPL-CCNC: 11 UNIT/L (ref 0–55)
AST SERPL-CCNC: 14 UNIT/L (ref 5–34)
BASOPHILS # BLD AUTO: 0.04 X10(3)/MCL
BASOPHILS NFR BLD AUTO: 0.6 %
BILIRUB SERPL-MCNC: 0.2 MG/DL
BUN SERPL-MCNC: 20.3 MG/DL (ref 9.8–20.1)
CALCIUM SERPL-MCNC: 9.2 MG/DL (ref 8.4–10.2)
CHLORIDE SERPL-SCNC: 110 MMOL/L (ref 98–107)
CO2 SERPL-SCNC: 25 MMOL/L (ref 23–31)
CREAT SERPL-MCNC: 0.77 MG/DL (ref 0.55–1.02)
EOSINOPHIL # BLD AUTO: 0.18 X10(3)/MCL (ref 0–0.9)
EOSINOPHIL NFR BLD AUTO: 2.7 %
ERYTHROCYTE [DISTWIDTH] IN BLOOD BY AUTOMATED COUNT: 13.7 % (ref 11.5–17)
GFR SERPLBLD CREATININE-BSD FMLA CKD-EPI: >60 ML/MIN/1.73/M2
GLOBULIN SER-MCNC: 2.8 GM/DL (ref 2.4–3.5)
GLUCOSE SERPL-MCNC: 91 MG/DL (ref 82–115)
HCT VFR BLD AUTO: 39.5 % (ref 37–47)
HGB BLD-MCNC: 12.5 G/DL (ref 12–16)
IMM GRANULOCYTES # BLD AUTO: 0.01 X10(3)/MCL (ref 0–0.04)
IMM GRANULOCYTES NFR BLD AUTO: 0.1 %
LYMPHOCYTES # BLD AUTO: 2.44 X10(3)/MCL (ref 0.6–4.6)
LYMPHOCYTES NFR BLD AUTO: 36.5 %
MCH RBC QN AUTO: 29.8 PG (ref 27–31)
MCHC RBC AUTO-ENTMCNC: 31.6 G/DL (ref 33–36)
MCV RBC AUTO: 94 FL (ref 80–94)
MONOCYTES # BLD AUTO: 0.57 X10(3)/MCL (ref 0.1–1.3)
MONOCYTES NFR BLD AUTO: 8.5 %
NEUTROPHILS # BLD AUTO: 3.45 X10(3)/MCL (ref 2.1–9.2)
NEUTROPHILS NFR BLD AUTO: 51.6 %
NRBC BLD AUTO-RTO: 0 %
PLATELET # BLD AUTO: 327 X10(3)/MCL (ref 130–400)
PMV BLD AUTO: 9 FL (ref 7.4–10.4)
POTASSIUM SERPL-SCNC: 4.4 MMOL/L (ref 3.5–5.1)
PROT SERPL-MCNC: 7 GM/DL (ref 5.8–7.6)
PTH-INTACT SERPL-MCNC: 138.9 PG/ML (ref 8.7–77)
RBC # BLD AUTO: 4.2 X10(6)/MCL (ref 4.2–5.4)
SODIUM SERPL-SCNC: 141 MMOL/L (ref 136–145)
WBC # SPEC AUTO: 6.69 X10(3)/MCL (ref 4.5–11.5)

## 2024-05-15 PROCEDURE — 3074F SYST BP LT 130 MM HG: CPT | Mod: CPTII,,, | Performed by: NURSE PRACTITIONER

## 2024-05-15 PROCEDURE — 83970 ASSAY OF PARATHORMONE: CPT

## 2024-05-15 PROCEDURE — 4010F ACE/ARB THERAPY RXD/TAKEN: CPT | Mod: CPTII,,, | Performed by: NURSE PRACTITIONER

## 2024-05-15 PROCEDURE — 85025 COMPLETE CBC W/AUTO DIFF WBC: CPT

## 2024-05-15 PROCEDURE — 1160F RVW MEDS BY RX/DR IN RCRD: CPT | Mod: CPTII,,, | Performed by: NURSE PRACTITIONER

## 2024-05-15 PROCEDURE — 80053 COMPREHEN METABOLIC PANEL: CPT

## 2024-05-15 PROCEDURE — 1159F MED LIST DOCD IN RCRD: CPT | Mod: CPTII,,, | Performed by: NURSE PRACTITIONER

## 2024-05-15 PROCEDURE — 36415 COLL VENOUS BLD VENIPUNCTURE: CPT

## 2024-05-15 PROCEDURE — 99214 OFFICE O/P EST MOD 30 MIN: CPT | Mod: ,,, | Performed by: NURSE PRACTITIONER

## 2024-05-15 PROCEDURE — 3078F DIAST BP <80 MM HG: CPT | Mod: CPTII,,, | Performed by: NURSE PRACTITIONER

## 2024-05-15 PROCEDURE — 3008F BODY MASS INDEX DOCD: CPT | Mod: CPTII,,, | Performed by: NURSE PRACTITIONER

## 2024-05-15 RX ORDER — OLMESARTAN MEDOXOMIL 20 MG/1
20 TABLET ORAL DAILY
Qty: 30 TABLET | Refills: 11 | Status: SHIPPED | OUTPATIENT
Start: 2024-05-15

## 2024-05-15 RX ORDER — DULOXETIN HYDROCHLORIDE 60 MG/1
60 CAPSULE, DELAYED RELEASE ORAL DAILY
Qty: 30 CAPSULE | Refills: 11 | Status: SHIPPED | OUTPATIENT
Start: 2024-05-15

## 2024-05-15 RX ORDER — AZITHROMYCIN 250 MG/1
TABLET, FILM COATED ORAL
Qty: 6 TABLET | Refills: 0 | Status: SHIPPED | OUTPATIENT
Start: 2024-05-15 | End: 2024-05-20

## 2024-05-15 RX ORDER — TIZANIDINE 4 MG/1
4 TABLET ORAL EVERY 8 HOURS PRN
Qty: 30 TABLET | Refills: 0 | Status: SHIPPED | OUTPATIENT
Start: 2024-05-15 | End: 2024-05-25

## 2024-05-15 NOTE — PROGRESS NOTES
"Subjective:      Patient ID: Eusebia Miramontes is a 63 y.o. female.    Chief Complaint: Sinusitis (X2 weeks--Sinus Pressure, yesterday sore throat started but subsided today.  At night face pressure.  OTC--Zyrtec every night and Singulair Rx every morning.  Denies Fever )      HPI: Patient here today for c/o general malaise, sinus pressure, sore throat x 2-3 weeks and bilateral hand joint pain. No fever. Intermittent chills and sweats.  Inc sinus pressure, which is reportedly worse at night. Inc fatigue. Significant h/o RA seeing Dr. Dailey.  She plans to get 2nd opinion with Dr. Snowden. Some throat burning and R ear/throat pain. Cough is productive with white, thick sputum. H/o hyperparathyroidism managed by Dr. Jarvis in the past.  Some inc stress with recent move, etc. Tx with abx last in Jan per patient/review of [facility] visit from 1/3.     Additionally, issues with chronic neck and low back pain. Requesting change in muscle relaxant to tizanidine due to insurance.     Review of patient's allergies indicates:  No Known Allergies    Review of Systems  Constitutional: No fever, No chills, No sweats, fatigue, No weight loss.  Ear/Nose/Mouth/Throat: nasal congestion, No vertigo.  Respiratory: No shortness of breath, No cough, No sputum production, No wheezing, No exertional dyspnea.   Cardiovascular: No chest pain, No palpitations, No claudication, No orthopnea, No peripheral edema.  Gastrointestinal: No nausea, No vomiting, No diarrhea  Musculoskeletal: Chronic neck/LBP  Psychiatrics: anxiety and depression, No SI/HI.  Objective:   Visit Vitals  /72 (BP Location: Right arm, Patient Position: Sitting, BP Method: Medium (Manual))   Pulse 72   Resp 20   Ht 5' 1" (1.549 m)   Wt 63.5 kg (140 lb)   SpO2 96%   BMI 26.45 kg/m²     The patient's weight trend is below:   Wt Readings from Last 4 Encounters:   05/15/24 63.5 kg (140 lb)   01/15/24 63 kg (139 lb)   01/03/24 63 kg (139 lb)   12/12/23 64.9 kg (143 lb)        Physical " Exam  Vitals and nursing note reviewed.   Constitutional:       General: She is not in acute distress.     Appearance: Normal appearance. She is normal weight. She is not ill-appearing, toxic-appearing or diaphoretic.   HENT:      Head: Normocephalic and atraumatic.      Right Ear: Tympanic membrane, ear canal and external ear normal.      Left Ear: Tympanic membrane, ear canal and external ear normal.      Nose: Congestion and rhinorrhea present.      Mouth/Throat:      Mouth: Mucous membranes are moist.      Pharynx: Oropharynx is clear. No oropharyngeal exudate or posterior oropharyngeal erythema.   Cardiovascular:      Rate and Rhythm: Normal rate and regular rhythm.      Pulses: Normal pulses.      Heart sounds: Normal heart sounds. No murmur heard.  Pulmonary:      Effort: Pulmonary effort is normal. No respiratory distress.      Breath sounds: Normal breath sounds. No stridor. No wheezing, rhonchi or rales.   Abdominal:      General: Abdomen is flat. Bowel sounds are normal. There is no distension.      Palpations: Abdomen is soft. There is no mass.      Tenderness: There is no abdominal tenderness. There is no guarding or rebound.      Hernia: No hernia is present.   Musculoskeletal:         General: Normal range of motion.      Cervical back: Normal range of motion and neck supple. No rigidity or tenderness.   Lymphadenopathy:      Cervical: No cervical adenopathy.   Skin:     General: Skin is warm and dry.   Neurological:      General: No focal deficit present.      Mental Status: She is alert and oriented to person, place, and time. Mental status is at baseline.      Motor: No weakness.   Psychiatric:         Mood and Affect: Mood normal.         Thought Content: Thought content normal.         Judgment: Judgment normal.         Assessment/Plan:   1. Acute non-recurrent sinusitis of other sinus  RX Z-pack as directed  OTCs as needed-written guidance given  Inc fluids    - azithromycin (Z-ROSHAN) 250 MG  tablet; Take 2 tablets by mouth on day 1; Take 1 tablet by mouth on days 2-5  Dispense: 6 tablet; Refill: 0    2. Other fatigue  Work up with labs  Address sinusitis    - CBC Auto Differential; Future  - Comprehensive Metabolic Panel; Future  - PTH, Intact; Future    3. Rheumatoid arthritis, involving unspecified site, unspecified whether rheumatoid factor present  Upcoming appt with Dr. Snowden  Some joint pain noted today  Continue to monitor    4. Inflammatory polyarthropathy  See #3    5. Hyperparathyroidism, unspecified  Work up with repeat labs    - CBC Auto Differential; Future  - Comprehensive Metabolic Panel; Future  - PTH, Intact; Future    6. Chronic bilateral low back pain without sciatica  Stop Skelaxin and RX tizanidine as requested  Ice/heat/stretching    - tiZANidine (ZANAFLEX) 4 MG tablet; Take 1 tablet (4 mg total) by mouth every 8 (eight) hours as needed (muscle pain).  Dispense: 30 tablet; Refill: 0    7. Chronic neck pain  See #6    - tiZANidine (ZANAFLEX) 4 MG tablet; Take 1 tablet (4 mg total) by mouth every 8 (eight) hours as needed (muscle pain).  Dispense: 30 tablet; Refill: 0    8. Anxiety and depression  Refilled Cymbalta   Stable on current dosage    - DULoxetine (CYMBALTA) 60 MG capsule; Take 1 capsule (60 mg total) by mouth once daily.  Dispense: 30 capsule; Refill: 11    9. Primary hypertension  Refilled Benicar    HYPERTENSION RECOMMENDATIONS:  Continue current treatment regimen.  Dietary sodium restriction.  Regular aerobic exercise.  Weight loss.  Reduce stress.  Goal BP <130/80; Encouraged to monitor blood pressure at home    - olmesartan (BENICAR) 20 MG tablet; Take 1 tablet (20 mg total) by mouth once daily.  Dispense: 30 tablet; Refill: 11      Medication List with Changes/Refills   New Medications    AZITHROMYCIN (Z-ROSHAN) 250 MG TABLET    Take 2 tablets by mouth on day 1; Take 1 tablet by mouth on days 2-5    TIZANIDINE (ZANAFLEX) 4 MG TABLET    Take 1 tablet (4 mg total) by  mouth every 8 (eight) hours as needed (muscle pain).   Current Medications    CLORAZEPATE (TRANXENE) 15 MG TABLET    Take 1 tablet (15 mg total) by mouth nightly as needed for Insomnia.    CYANOCOBALAMIN (VITAMIN B-12) 100 MCG TABLET    Take 100 mcg by mouth once daily.    FLUTICASONE PROPIONATE (FLONASE) 50 MCG/ACTUATION NASAL SPRAY    1 spray by Each Nostril route 2 (two) times daily.    MONTELUKAST (SINGULAIR) 10 MG TABLET    Take 10 mg by mouth every evening.    MUPIROCIN (BACTROBAN) 2 % OINTMENT    Apply topically 3 (three) times daily.    NITROFURANTOIN, MACROCRYSTAL-MONOHYDRATE, (MACROBID) 100 MG CAPSULE    Take 1 capsule (100 mg total) by mouth once daily.    RESTASIS 0.05 % OPHTHALMIC EMULSION    Place 1 drop into both eyes 2 (two) times daily.    VITAMIN D (VITAMIN D3) 1000 UNITS TAB    Take 1,000 Units by mouth once daily.   Changed and/or Refilled Medications    Modified Medication Previous Medication    DULOXETINE (CYMBALTA) 60 MG CAPSULE DULoxetine (CYMBALTA) 60 MG capsule       Take 1 capsule (60 mg total) by mouth once daily.    Take 1 capsule (60 mg total) by mouth once daily.    OLMESARTAN (BENICAR) 20 MG TABLET olmesartan (BENICAR) 20 MG tablet       Take 1 tablet (20 mg total) by mouth once daily.    Take 1 tablet (20 mg total) by mouth once daily.   Discontinued Medications    LEVOCETIRIZINE (XYZAL) 5 MG TABLET    Take 5 mg by mouth every evening.    LORATADINE (CLARITIN) 10 MG TABLET    Take 10 mg by mouth once daily.    METAXALONE (SKELAXIN) 800 MG TABLET    Take 1 tablet (800 mg total) by mouth once daily.    PANTOPRAZOLE (PROTONIX) 40 MG TABLET    Take 40 mg by mouth 2 (two) times daily.    RALOXIFENE (EVISTA) 60 MG TABLET    Take 1 tablet (60 mg total) by mouth once daily.        No follow-ups on file.    Chemistry:  Lab Results   Component Value Date     03/29/2023    K 3.9 03/29/2023    CHLORIDE 106 03/29/2023    BUN 15.8 03/29/2023    CREATININE 0.84 03/29/2023    EGFRNORACEVR >60  "03/29/2023    GLUCOSE 99 03/29/2023    CALCIUM 10.2 03/29/2023    ALKPHOS 76 03/29/2023    LABPROT 7.5 03/29/2023    ALBUMIN 4.7 03/29/2023    BILIDIR 0.2 06/09/2021    IBILI 0.10 06/09/2021    AST 18 03/29/2023    ALT 12 03/29/2023    SHJJDSKH08PW 37.9 06/09/2021        No results found for: "HGBA1C", "MICROALBCREA"     Hematology:  Lab Results   Component Value Date    WBC 6.4 03/29/2023    HGB 13.2 03/29/2023    HCT 41.1 03/29/2023     03/29/2023       Lipid Panel:  Lab Results   Component Value Date    CHOL 200 04/16/2021    HDL 92 (H) 04/16/2021    LDL 98.00 04/16/2021    TRIG 48 04/16/2021    TOTALCHOLEST 2 04/16/2021        Urine:  Lab Results   Component Value Date    COLORUA Yellow 03/29/2023    APPEARANCEUA Clear 03/29/2023    SGUA 1.007 03/29/2023    PHUA 5.5 03/29/2023    PROTEINUA Negative 03/29/2023    GLUCOSEUA Negative 03/29/2023    KETONESUA Negative 03/29/2023    BLOODUA Trace (A) 03/29/2023    NITRITESUA Negative 03/29/2023    LEUKOCYTESUR Trace (A) 03/29/2023    RBCUA <5 03/29/2023    WBCUA <5 03/29/2023    BACTERIA None Seen 03/29/2023        "

## 2024-05-16 ENCOUNTER — TELEPHONE (OUTPATIENT)
Dept: INTERNAL MEDICINE | Facility: CLINIC | Age: 64
End: 2024-05-16
Payer: MEDICARE

## 2024-05-16 DIAGNOSIS — R79.89 ELEVATED PARATHYROID HORMONE: Primary | ICD-10-CM

## 2024-05-16 NOTE — TELEPHONE ENCOUNTER
----- Message from Lynn Hoang NP sent at 5/16/2024  1:38 PM CDT -----  Please let patient know that labs stable with mild inc in BUN and PTH.  Denies s/s of renal stones. Profound fatigue, although she just picked up antibiotic for sinuses today. Advised to complete. Recommend to repeat labs in 6 months.  Verbalized understanding to above recommendations. F/u as needed/as scheduled.

## 2024-05-30 DIAGNOSIS — F41.9 ANXIETY AND DEPRESSION: Primary | ICD-10-CM

## 2024-05-30 DIAGNOSIS — F32.A ANXIETY AND DEPRESSION: Primary | ICD-10-CM

## 2024-05-30 RX ORDER — DULOXETIN HYDROCHLORIDE 30 MG/1
30 CAPSULE, DELAYED RELEASE ORAL
Qty: 210 CAPSULE | Refills: 1 | Status: SHIPPED | OUTPATIENT
Start: 2024-05-30

## 2024-07-03 NOTE — PROGRESS NOTES
[facility] Breast Surg  Breast Surgical Oncology  Follow Up Patient Office Visit       Referring Provider: No ref. provider found  PCP: Leobardo Molina MD     Chief Complaint:   No chief complaint on file.     Subjective:     Treatments:  1/13/2023 - Left Breast Excisional Biopsy targeting two adjacent masses in the 5:00 position 6 cmfn    Interval History:  07/03/2024 - Eusebia Miramontes is here for 6 month high risk follow up appointment. She is doing well today. She still complains of bilateral intermittent breast pain which has been going on for multiple years now. She describes pain as a dull/achy pain and locates it in bilateral UOQ. She has tried changing to bras without under wire and taking vitamin E, but she has found no relief. She limits caffeine intake to about one cup of coffee per day. She has no other breast complaints today. She recently underwent breast MRI earlier this month which resulted as benign. She denies any significant interval changes or any changes to family history. She has no other questions or concerns today.     HPI:  Eusebia Miramontes is a 63 y.o. female who presents on 12/27/2022 for evaluation of  left breast complex sclerosing lesions . She is s/p left breast excisional biopsy on 1/13/2023. Surgical pathology revealed residual complex sclerosing lesion with focal atypical ductal hyperplasia. Her lifetime risk for breast cancer was re-calculated to include ADH and is found to be elevated according to the Tyrer Cuzick model (34.1% on v7 and 28.1% on v8). She met with Dr. Dee and declined chemoprevention with AI or Tamoxifen. She was noted to have a low BMD, and Dr. Dailey started her on Evista, which treats low BMD and also is shown to decrease risk of invasive breast cancer in women at high risk although less effective than Tamoxifen. She did not tolerate the medication well so it was discontinued. She elected to continue high risk screening with  supplemental breast MRIs.    She currently denies any breast issues including rashes, redness, pain, swelling, nipple discharge, or new lumps/masses.    Imagin2022 SCR MG at [facility] - There is a 0.5 cm oval mass with an indistinct margin in the left breast at 6:00 posterior depth. BI-RADS 0: Incomplete: Left diagnostic mammography with tomosynthesis (to include spot compression views), followed by targeted left breast ultrasound if needed.    2022 L DG MG at [facility] - In the 5:00 left breast posterior depth, there is a 0.6 cm oval mass with circumscribed margins, which corresponds to the mass seen in the 6:00 left breast posterior depth on recent screening mammogram. Targeted US in the 5:00 left breast 6 cm from the nipple reveals two adjacent parallel oval avascular heterogeneous masses with circumscribed margins, which are located approximately 1 cm apart. The larger of the two masses, measuring 0.6 cm x 0.3 cm x 0.5 cm, correlates with the oval mass in the 5:00 left breast posterior depth on mammogram.  The second mass measures 0.5 cm x 0.2 cm x 0.4 cm. BIRADS 4: Left breast 5:00 6 cm from the nipple two adjacent oval circumscribed heterogeneous masses are suspicious for malignancy. Ultrasound guided core needle biopsy of the larger of the two masses in the 5:00 left breast 6 cm from the nipple is recommended.    2023 Breast MRI at [facility] - 1) No MRI evidence of malignancy in either breast.  2) Left breast lower outer quadrant middle to posterior depths surgical excisional biopsy site demonstrates no abnormal enhancement.  3) 0.5 cm oval circumscribed low T1 signal, high T2 signal, homogeneously enhancing osseous lesion in the central aspect of the manubrium.  This finding is quite small and nonspecific; however, the differential diagnostic possibilities include malignancy or other osseous lesions that may need treatment.    9/15/2023 NM Bone Scan Whole Body - Impression: There is no scintigraphic  evidence of metastatic disease.    2023 SCR MG - 1. The focal asymmetry with an associated calcification in the 6:00 left breast, middle depth, needs further evaluation.  2. The asymmetry in the left breast middle depth superior region seen on the mediolateral oblique view only needs further evaluation.   Recommend a left diagnostic mammogram (to include magnification and spot compression tomosynthesis views) and possible left breast ultrasound if needed.     2023 L DG MG and L breast US - BIRADS 2: BENIGN: 1. No mammographic evidence of malignancy is seen involving the left breast.  No sonographic evidence of malignancy is seen involving the superior, 5:00, 6:00, or 7:00 left breast.2. Fibrocystic changes in the left breast are benign.    2024 Breast MRI - No MRI evidence of malignancy in either breast. BI-RADS: 2 Benign     Pathology:   2022 - Left Breast 5:00 6 cmfn - Complex sclerosing lesion with moderate duct epithelial hyperplasia, usual type.    Please note that radiology recommends that this pathology is also applied to the additional oval, heterogeneous mass with circumscribed margins in the 5:00 left breast, 6 cm from the nipple.    2023 Left Breast Excisional Biopsy - residual complex sclerosing lesion with focal atypical ductal hyperplasia. The margins are free of atypical proliferation.    OB/GYN History:  Age at Menarche Onset: 15  Menopausal Status: postmenopausal, at age 48  Hysterectomy/Oophorectomy: no  Hormonal birth control (duration): 20 years  Pregnancy History:   Age at first live birth: 23  Hormone Replacement Therapy: Yes,  reports progesterone (but likely combination HRT), stopped 5-7 years ago    Other:  # of breast biopsies (when and pathology results): 2022 - [facility]  MG breast density: Category B  Prior thoracic RT: none  Genetic testing: none  [employer] descent: No    Family History:  Family History   Problem Relation Name Age of Onset    Dementia  Mother Kait Mariano     Arthritis Mother Kait Daleydoin     Parkinsonism Mother Kait Mariano     Arthritis Father Carl Daleydoin         Kidney Failure,Heart problems,COPD,    COPD Father Carl Mariano     Kidney failure Father Carl Daleydoin     Heart disease Father Carl Olguinin     Birth defects Son Enrique Her         Intestines twisted cause of death also he  had cerebral Palsy    Cerebral palsy Son Enrique Her     Stomach cancer Maternal Aunt  76 - 79    Leukemia Paternal Uncle          Patient History:  Past Medical History:   Diagnosis Date    Breast cyst     left breast    Fibromyalgia     GERD (gastroesophageal reflux disease)     Hyperlipidemia     Hypertension     Systemic lupus erythematosus, organ or system involvement unspecified        Past Surgical History:   Procedure Laterality Date    ADENOIDECTOMY      BILATERAL TUBAL LIGATION      BREAST SURGERY Left     EXCISION, MASS, BREAST, USING RADIOLOGICAL MARKER Left 2023    Procedure: EXCISION,MASS,BREAST,USING RADIOLOGICAL MARKER Left;  Surgeon: Carmen Gomez MD;  Location: [facility];  Service: General;  Laterality: Left;    PARATHYROIDECTOMY      TONSILLECTOMY      TUBAL LIGATION  3/3/1997       Social History     Socioeconomic History    Marital status: Legally    Tobacco Use    Smoking status: Former     Current packs/day: 0.00     Average packs/day: 0.3 packs/day for 5.0 years (1.3 ttl pk-yrs)     Types: Cigarettes     Start date: 1985     Quit date: 1990     Years since quittin.6    Smokeless tobacco: Never    Tobacco comments:     Only smoked a pack per week only smoked if I was out   Substance and Sexual Activity    Alcohol use: Not Currently     Comment: socially    Drug use: Not Currently     Types: Marijuana    Sexual activity: Not Currently     Partners: Male     Birth control/protection: None       Immunization History   Administered Date(s) Administered    COVID-19, MRNA, LN-S, PF (Pfizer) (Purple Cap) 2021,  03/26/2021       Medications/Allergies:    Current Outpatient Medications:     cyanocobalamin (VITAMIN B-12) 100 MCG tablet, Take 100 mcg by mouth once daily., Disp: , Rfl:     DULoxetine (CYMBALTA) 30 MG capsule, Take 1 capsule by mouth once daily, Disp: 210 capsule, Rfl: 1    DULoxetine (CYMBALTA) 60 MG capsule, Take 1 capsule (60 mg total) by mouth once daily., Disp: 30 capsule, Rfl: 11    fluticasone propionate (FLONASE) 50 mcg/actuation nasal spray, 1 spray by Each Nostril route 2 (two) times daily., Disp: , Rfl:     olmesartan (BENICAR) 20 MG tablet, Take 1 tablet (20 mg total) by mouth once daily., Disp: 30 tablet, Rfl: 11    RESTASIS 0.05 % ophthalmic emulsion, Place 1 drop into both eyes 2 (two) times daily., Disp: , Rfl:     vitamin D (VITAMIN D3) 1000 units Tab, Take 1,000 Units by mouth once daily., Disp: , Rfl:     clorazepate (TRANXENE) 15 MG tablet, Take 1 tablet (15 mg total) by mouth nightly as needed for Insomnia., Disp: 15 tablet, Rfl: 5    montelukast (SINGULAIR) 10 mg tablet, Take 10 mg by mouth every evening., Disp: , Rfl:     mupirocin (BACTROBAN) 2 % ointment, Apply topically 3 (three) times daily., Disp: 15 g, Rfl: 0    nitrofurantoin, macrocrystal-monohydrate, (MACROBID) 100 MG capsule, Take 1 capsule (100 mg total) by mouth once daily., Disp: 30 capsule, Rfl: 5     Review of patient's allergies indicates:  No Known Allergies    Review of Systems:  All pertinent history in HPI.     Objective:     Vitals:  There were no vitals filed for this visit.      There is no height or weight on file to calculate BMI.     Physical Exam:  General: The patient is awake, alert and oriented times three. The patient is well nourished and in no acute distress.  Neck: There is no evidence of palpable cervical, supraclavicular or axillary adenopathy. The neck is supple. The thyroid is not enlarged.  Musculoskeletal: The patient has a normal range of motion of her bilateral upper extremities.  Chest:  Examination of the chest wall fails to reveal any obvious abnormalities. Nonlabored breathing, symmetric expansion.  Breast:  Right:  Examination of right breast fails to reveal any dominant masses or areas of significant focal nodularity. The nipple is everted without evidence of discharge. There is no skin dimpling with movement of the pectoralis. There is no significant skin changes overlying the breast.   Left: Well healed excisional biopsy scar in the lower outer quadrant. The nipple is everted without evidence of discharge. There is no skin dimpling with movement of the pectoralis. There are no significant skin changes overlying the breast.  Abdomen: The abdomen is soft, flat, nontender and nondistended.  Integumentary: no rashes or skin lesions present          Assessment and Plan:     Patient Active Problem List   Diagnosis    Benign essential hypertension    Fibromyositis    Degeneration of intervertebral disc of cervical region    Hyperlipidemia    Heart valve disease    Osteoarthritis    Positive antinuclear antibody    Adolescent idiopathic scoliosis of thoracolumbar region    History of kidney stones    Atypical ductal hyperplasia of left breast    Acute cystitis with hematuria    Other chest pain    Pharyngitis    Abnormal computed tomography scan    Dyslipidemia    HTN (hypertension)    Systemic lupus erythematosus    Rheumatoid arthritis, involving unspecified site, unspecified whether rheumatoid factor present    Hyperparathyroidism, unspecified      There are no diagnoses linked to this encounter.    Fibrocystic breast change is a common noncancerous condition that affects mostly premenopausal women. Fibrocystic breast changes encompass a wide variety of symptoms, including breast tenderness or discomfort, the sudden appearance or disappearance of palpable benign masses in the breast, or lumpy, free-moving masses in the breast. Fibrocystic breast changes are most common in women ages 20 to 50. It  is extremely rare in women past menopause not receiving hormonal replacement. It affects an estimated 50% of women between the ages of 20 and 50. Fibrocystic changes are the most frequent lesion of the breast.     Drinking alcohol may increase the risk of fibrocystic breast changes, especially in young women between 18 and 22 years of age. Caffeine is also thought to contribute to the severity of fibrocystic breast changes and pain. The exact mechanism is unclear, fibrocystic breast changes are believed to be caused by fluctuating levels of hormones, especially estrogen, during the menstrual cycle.     Fibrocystic breast changes encompass a wide variety of symptoms including:   - Breast tenderness or discomfort, which can worsen during the immediate premenstrual period.   - The sudden appearance or disappearance of palpable benign masses in the breast.   - Lumpy, free-moving masses in the breast, often near the armpit.     In the majority of cases, no treatment is needed. Those suffering pain or discomfort due to fibrocystic breast changes are advised to wear a good, supportive bra both day and night when symptoms are worse. It is also advised to avoid contact sports and other activities which could cause injury to the breasts. It is also advised to avoid caffeine consumption: coffee, tea, soda, and chocolate can all contribute to the pain. Sometimes, the cyst can be completely aspirated through the needle during the biopsy, but future masses can occur in patients with fibrocystic breast changes.    What is breast pain?  Breast pain (mastalgia) is a common type of discomfort among women, affecting as many as seven in 10 women at some point in their lives.  About 10 percent of women have moderate to severe breast pain more than five days a month. In some cases, severe breast pain lasts throughout the menstrual cycles.  The symptom occurs most frequently in young, premenopausal and perimenopausal women.  Breast pain  alone rarely signifies breast cancer. Still, if you have unexplained breast pain that persists, get checked by your provider.     Causes of breast pain  Most of the time, it's not possible to identify the exact cause of breast pain. Likely contributors are hormonal changes, anatomical issues (breast trauma, prior breast surgery, or chest wall pain), breast size, and medication use (oral contraceptives, infertility treatments, estrogen/progesterone hormone therapy, and some antidepressants).     Treatments and supplements for breast pain  Use hot or cold compresses on your breasts.  Wear a firm support bra, fitted by a professional if possible.  Wear a sports bra during exercise and while sleeping, especially when your breast may be more sensitive.  Limit or eliminate caffeine, a dietary change many women swear by.  Decrease the fat in your diet.  Use a pain reliever, such as acetaminophen (Tylenol) or ibuprofen (Advil, Motrin) to alleviate breast pain.  May try Voltaren gel topical applications (2 g up to 4 times per day) to affected area of the breast.  Keep a journal noting when you experience breast pain and other symptoms, to determine if your pain is cyclic.  Vitamin E can help with breast pain (up to1,200 IU per day). Many women have found it to be helpful, but it usually takes up to 2 months of taking this to see a change.  Evening primrose oil; this supplement appears to change the balance of fatty acids in your cells, which may reduce breast pain. You can take a 1000 mg capsule up to three times a day.     Adopted from [email or web address]      Plan:   Lifestyle - Healthy lifestyle guidelines were reviewed. She was encouraged to engage in regular exercise, maintain a healthy body weight, and avoid excessive alcohol consumption. Healthy nutritional guidelines were also discussed. Self-breast examination was reviewed with the patient in detail and she was encouraged to perform this on a monthly  basis.  Mastalgia - I discussed ways to reduce breast pain/tenderness. Avoid caffeine (coffee, teas, chocolate, sodas). Drinking alcohol may also increase the risk of fibrocystic changes and breast pain. I also advised to wear a good, supportive bra both day and night when symptoms are worse. Avoid contact sports and other activities which could cause injury to the breasts. She may also consider taking Vitamin E/Primrose Oil supplementation to reduce pain.  She may consider Voltaren gel as needed for breast pain.  3. Screening - SC MG in January 2025 and Breast MRI in July 2025.   4. Prevention - She met with Dr. Dee and declined chemoprevention with AI or Tamoxifen. She was noted to have a low BMD, and Dr. Dailey started her on Evista, which treats low BMD and also is shown to decrease risk of invasive breast cancer in women at high risk although less effective than Tamoxifen. She did not tolerate the medication well so it was discontinued. She elected to continue high risk screening alone.  5. Follow up - RTC in 1 year.   6. Other -    Continue to see OB/GYN for CBE. Recommend two CBE a year. One with us and one with your OB/GYN Provider. We recommend them to be at a six month interval.       All of her questions were answered. She was advised to call if she develops any questions or concerns.    Barbi Cardona PA-C         Total time on the date of the visit ranged from 30-39 mins (01144). Total time includes both face-to-face and non-face-to-face time personally spent by myself on the day of the visit.    Non-face-to-face time included:  _X_ preparing to see the patient such as reviewing the patient record  __ obtaining and reviewing separately obtained history  _X_ independently interpreting results  _X_ documenting clinical information in electronic health record.    Face-to-face time included:  _X_ performing an appropriate history and examination  _X_ communicating results to the patient  _X_ counseling  and educating the patient  __ ordering appropriate medications  _x_ ordering appropriate tests  _X_ ordering appropriate procedures (including follow-up)  _X_ answering any questions the patient had    Total Time spent on date of visit: 35 minutes

## 2024-07-08 ENCOUNTER — TELEPHONE (OUTPATIENT)
Dept: INTERNAL MEDICINE | Facility: CLINIC | Age: 64
End: 2024-07-08
Payer: MEDICARE

## 2024-07-08 ENCOUNTER — OFFICE VISIT (OUTPATIENT)
Dept: SURGERY | Facility: CLINIC | Age: 64
End: 2024-07-08
Payer: MEDICARE

## 2024-07-08 VITALS
RESPIRATION RATE: 18 BRPM | SYSTOLIC BLOOD PRESSURE: 129 MMHG | HEIGHT: 61 IN | OXYGEN SATURATION: 98 % | DIASTOLIC BLOOD PRESSURE: 86 MMHG | HEART RATE: 97 BPM | WEIGHT: 139.19 LBS | BODY MASS INDEX: 26.28 KG/M2 | TEMPERATURE: 98 F

## 2024-07-08 DIAGNOSIS — N64.89 COMPLEX SCLEROSING LESION OF LEFT BREAST: ICD-10-CM

## 2024-07-08 DIAGNOSIS — N60.92 ATYPICAL DUCTAL HYPERPLASIA OF LEFT BREAST: ICD-10-CM

## 2024-07-08 DIAGNOSIS — Z91.89 AT HIGH RISK FOR BREAST CANCER: Primary | ICD-10-CM

## 2024-07-08 DIAGNOSIS — M54.50 CHRONIC BILATERAL LOW BACK PAIN WITHOUT SCIATICA: Primary | ICD-10-CM

## 2024-07-08 DIAGNOSIS — Z12.31 SCREENING MAMMOGRAM FOR BREAST CANCER: ICD-10-CM

## 2024-07-08 DIAGNOSIS — G89.29 CHRONIC BILATERAL LOW BACK PAIN WITHOUT SCIATICA: Primary | ICD-10-CM

## 2024-07-08 PROCEDURE — 3074F SYST BP LT 130 MM HG: CPT | Mod: CPTII,S$GLB,,

## 2024-07-08 PROCEDURE — 4010F ACE/ARB THERAPY RXD/TAKEN: CPT | Mod: CPTII,S$GLB,,

## 2024-07-08 PROCEDURE — 3079F DIAST BP 80-89 MM HG: CPT | Mod: CPTII,S$GLB,,

## 2024-07-08 PROCEDURE — 99214 OFFICE O/P EST MOD 30 MIN: CPT | Mod: S$GLB,,,

## 2024-07-08 PROCEDURE — 1160F RVW MEDS BY RX/DR IN RCRD: CPT | Mod: CPTII,S$GLB,,

## 2024-07-08 PROCEDURE — 3008F BODY MASS INDEX DOCD: CPT | Mod: CPTII,S$GLB,,

## 2024-07-08 PROCEDURE — 99999 PR PBB SHADOW E&M-EST. PATIENT-LVL V: CPT | Mod: PBBFAC,,,

## 2024-07-08 PROCEDURE — 1159F MED LIST DOCD IN RCRD: CPT | Mod: CPTII,S$GLB,,

## 2024-07-08 RX ORDER — TIZANIDINE 2 MG/1
4 TABLET ORAL EVERY 8 HOURS PRN
Qty: 30 TABLET | Refills: 0 | Status: SHIPPED | OUTPATIENT
Start: 2024-07-08 | End: 2024-07-18

## 2024-07-08 NOTE — TELEPHONE ENCOUNTER
----- Message from Lynn Hoang NP sent at 7/8/2024  1:43 PM CDT -----  Regarding: RE: Rx refill  Refilled. Please let pt know to use sparingly. Will need to be refilled by MD in future.  ----- Message -----  From: Irma Mancuso LPN  Sent: 7/8/2024   1:28 PM CDT  To: Lynn Hoang NP  Subject: FW: Rx refill                                    Do you want to refill this medication? Please advise.  ----- Message -----  From: Lejeune, Cheryl  Sent: 7/8/2024  11:37 AM CDT  To: Irma Mancuso LPN  Subject: Rx refill                                        Pt stopped by office and is requesting a refill of tiZANidine (ZANAFLEX) 4 MG tablet that Lynn prescribed from her last visit.    [facility] [address] [place] [address] [address]BASSADOR REUBEN CHONGY (Ph: [phone])      [phone]

## 2024-09-23 ENCOUNTER — TELEPHONE (OUTPATIENT)
Dept: INTERNAL MEDICINE | Facility: CLINIC | Age: 64
End: 2024-09-23
Payer: MEDICARE

## 2024-09-23 DIAGNOSIS — I10 PRIMARY HYPERTENSION: ICD-10-CM

## 2024-09-23 RX ORDER — OLMESARTAN MEDOXOMIL 20 MG/1
20 TABLET ORAL DAILY
Qty: 30 TABLET | Refills: 11 | Status: SHIPPED | OUTPATIENT
Start: 2024-09-23

## 2024-09-23 NOTE — TELEPHONE ENCOUNTER
----- Message from Marti Salguero sent at 9/23/2024 10:28 AM CDT -----  Regarding: med advice  Who Called: Eusebia Duhon    Caller is requesting assistance/information from provider's office.    Symptoms (please be specific):    How long has patient had these symptoms:    List of preferred pharmacies on file (remove unneeded): [unfilled]  If different, enter pharmacy into here including location and phone number:       Preferred Method of Contact: Phone Call  Patient's Preferred Phone Number on File: [phone]   Best Call Back Number, if different:  Additional Information: pt is requesting a call back in regards to labs, states she need to have thyroids checked due to always feeling tired

## 2024-09-23 NOTE — TELEPHONE ENCOUNTER
Refill request for Olmesartan sent to pt pharmacy. Pt will wait till her appointment in November to get labwork completed.

## 2024-10-28 ENCOUNTER — TELEPHONE (OUTPATIENT)
Dept: INTERNAL MEDICINE | Facility: CLINIC | Age: 64
End: 2024-10-28
Payer: MEDICARE

## 2024-10-28 DIAGNOSIS — Z13.21 SCREENING FOR ENDOCRINE, NUTRITIONAL, METABOLIC AND IMMUNITY DISORDER: ICD-10-CM

## 2024-10-28 DIAGNOSIS — Z13.89 SCREENING FOR CARDIOVASCULAR, RESPIRATORY, AND GENITOURINARY DISEASES: ICD-10-CM

## 2024-10-28 DIAGNOSIS — Z13.29 SCREENING FOR ENDOCRINE, NUTRITIONAL, METABOLIC AND IMMUNITY DISORDER: ICD-10-CM

## 2024-10-28 DIAGNOSIS — I10 PRIMARY HYPERTENSION: Primary | ICD-10-CM

## 2024-10-28 DIAGNOSIS — Z13.83 SCREENING FOR CARDIOVASCULAR, RESPIRATORY, AND GENITOURINARY DISEASES: ICD-10-CM

## 2024-10-28 DIAGNOSIS — Z13.228 SCREENING FOR ENDOCRINE, NUTRITIONAL, METABOLIC AND IMMUNITY DISORDER: ICD-10-CM

## 2024-10-28 DIAGNOSIS — Z13.6 SCREENING FOR CARDIOVASCULAR, RESPIRATORY, AND GENITOURINARY DISEASES: ICD-10-CM

## 2024-10-28 DIAGNOSIS — Z13.0 SCREENING FOR ENDOCRINE, NUTRITIONAL, METABOLIC AND IMMUNITY DISORDER: ICD-10-CM

## 2024-10-28 DIAGNOSIS — Z00.00 WELLNESS EXAMINATION: ICD-10-CM

## 2024-10-29 ENCOUNTER — LAB VISIT (OUTPATIENT)
Dept: LAB | Facility: HOSPITAL | Age: 64
End: 2024-10-29
Attending: INTERNAL MEDICINE
Payer: MEDICARE

## 2024-10-29 DIAGNOSIS — Z13.0 SCREENING FOR ENDOCRINE, NUTRITIONAL, METABOLIC AND IMMUNITY DISORDER: ICD-10-CM

## 2024-10-29 DIAGNOSIS — Z13.21 SCREENING FOR ENDOCRINE, NUTRITIONAL, METABOLIC AND IMMUNITY DISORDER: ICD-10-CM

## 2024-10-29 DIAGNOSIS — Z13.29 SCREENING FOR ENDOCRINE, NUTRITIONAL, METABOLIC AND IMMUNITY DISORDER: ICD-10-CM

## 2024-10-29 DIAGNOSIS — Z13.228 SCREENING FOR ENDOCRINE, NUTRITIONAL, METABOLIC AND IMMUNITY DISORDER: ICD-10-CM

## 2024-10-29 DIAGNOSIS — Z13.89 SCREENING FOR CARDIOVASCULAR, RESPIRATORY, AND GENITOURINARY DISEASES: ICD-10-CM

## 2024-10-29 DIAGNOSIS — Z13.83 SCREENING FOR CARDIOVASCULAR, RESPIRATORY, AND GENITOURINARY DISEASES: ICD-10-CM

## 2024-10-29 DIAGNOSIS — Z00.00 WELLNESS EXAMINATION: ICD-10-CM

## 2024-10-29 DIAGNOSIS — I10 PRIMARY HYPERTENSION: ICD-10-CM

## 2024-10-29 DIAGNOSIS — Z13.6 SCREENING FOR CARDIOVASCULAR, RESPIRATORY, AND GENITOURINARY DISEASES: ICD-10-CM

## 2024-10-29 LAB
BASOPHILS # BLD AUTO: 0.02 X10(3)/MCL
BASOPHILS NFR BLD AUTO: 0.4 %
CHOLEST SERPL-MCNC: 189 MG/DL
CHOLEST/HDLC SERPL: 2 {RATIO} (ref 0–5)
EOSINOPHIL # BLD AUTO: 0.1 X10(3)/MCL (ref 0–0.9)
EOSINOPHIL NFR BLD AUTO: 2.1 %
ERYTHROCYTE [DISTWIDTH] IN BLOOD BY AUTOMATED COUNT: 14.2 % (ref 11.5–17)
HCT VFR BLD AUTO: 39.5 % (ref 37–47)
HDLC SERPL-MCNC: 87 MG/DL (ref 35–60)
HGB BLD-MCNC: 12.7 G/DL (ref 12–16)
IMM GRANULOCYTES # BLD AUTO: 0.01 X10(3)/MCL (ref 0–0.04)
IMM GRANULOCYTES NFR BLD AUTO: 0.2 %
LDLC SERPL CALC-MCNC: 87 MG/DL (ref 50–140)
LYMPHOCYTES # BLD AUTO: 1.93 X10(3)/MCL (ref 0.6–4.6)
LYMPHOCYTES NFR BLD AUTO: 41.3 %
MCH RBC QN AUTO: 29.2 PG (ref 27–31)
MCHC RBC AUTO-ENTMCNC: 32.2 G/DL (ref 33–36)
MCV RBC AUTO: 90.8 FL (ref 80–94)
MONOCYTES # BLD AUTO: 0.31 X10(3)/MCL (ref 0.1–1.3)
MONOCYTES NFR BLD AUTO: 6.6 %
NEUTROPHILS # BLD AUTO: 2.3 X10(3)/MCL (ref 2.1–9.2)
NEUTROPHILS NFR BLD AUTO: 49.4 %
NRBC BLD AUTO-RTO: 0 %
PLATELET # BLD AUTO: 294 X10(3)/MCL (ref 130–400)
PMV BLD AUTO: 8.9 FL (ref 7.4–10.4)
RBC # BLD AUTO: 4.35 X10(6)/MCL (ref 4.2–5.4)
TRIGL SERPL-MCNC: 76 MG/DL (ref 37–140)
TSH SERPL-ACNC: 1.55 UIU/ML (ref 0.35–4.94)
VLDLC SERPL CALC-MCNC: 15 MG/DL
WBC # BLD AUTO: 4.67 X10(3)/MCL (ref 4.5–11.5)

## 2024-10-29 PROCEDURE — 85025 COMPLETE CBC W/AUTO DIFF WBC: CPT

## 2024-10-29 PROCEDURE — 84443 ASSAY THYROID STIM HORMONE: CPT

## 2024-10-29 PROCEDURE — 36415 COLL VENOUS BLD VENIPUNCTURE: CPT

## 2024-10-29 PROCEDURE — 80061 LIPID PANEL: CPT

## 2024-10-30 ENCOUNTER — TELEPHONE (OUTPATIENT)
Dept: INTERNAL MEDICINE | Facility: CLINIC | Age: 64
End: 2024-10-30
Payer: MEDICARE

## 2024-11-04 ENCOUNTER — OFFICE VISIT (OUTPATIENT)
Dept: INTERNAL MEDICINE | Facility: CLINIC | Age: 64
End: 2024-11-04
Payer: MEDICARE

## 2024-11-04 VITALS
SYSTOLIC BLOOD PRESSURE: 110 MMHG | HEIGHT: 61 IN | OXYGEN SATURATION: 99 % | BODY MASS INDEX: 26.96 KG/M2 | RESPIRATION RATE: 18 BRPM | HEART RATE: 106 BPM | DIASTOLIC BLOOD PRESSURE: 78 MMHG | WEIGHT: 142.81 LBS

## 2024-11-04 DIAGNOSIS — E78.5 HYPERLIPIDEMIA, UNSPECIFIED HYPERLIPIDEMIA TYPE: ICD-10-CM

## 2024-11-04 DIAGNOSIS — M25.50 ARTHRALGIA, UNSPECIFIED JOINT: ICD-10-CM

## 2024-11-04 DIAGNOSIS — F32.A ANXIETY AND DEPRESSION: ICD-10-CM

## 2024-11-04 DIAGNOSIS — R53.83 FATIGUE, UNSPECIFIED TYPE: ICD-10-CM

## 2024-11-04 DIAGNOSIS — E21.3 HYPERPARATHYROIDISM, UNSPECIFIED: ICD-10-CM

## 2024-11-04 DIAGNOSIS — M79.7 FIBROMYALGIA: ICD-10-CM

## 2024-11-04 DIAGNOSIS — M06.4 INFLAMMATORY POLYARTHROPATHY: ICD-10-CM

## 2024-11-04 DIAGNOSIS — Z00.00 WELLNESS EXAMINATION: Primary | ICD-10-CM

## 2024-11-04 DIAGNOSIS — M45.A7 NON-RADIOGRAPHIC AXIAL SPONDYLOARTHRITIS OF LUMBOSACRAL REGION: ICD-10-CM

## 2024-11-04 DIAGNOSIS — I10 PRIMARY HYPERTENSION: ICD-10-CM

## 2024-11-04 DIAGNOSIS — F41.9 ANXIETY AND DEPRESSION: ICD-10-CM

## 2024-11-04 LAB
BACTERIA #/AREA URNS AUTO: ABNORMAL /HPF
BILIRUB UR QL STRIP.AUTO: NEGATIVE
CLARITY UR: CLEAR
COLOR UR AUTO: YELLOW
EPI CELLS #/AREA URNS HPF: ABNORMAL /HPF
GLUCOSE UR QL STRIP: NORMAL
HGB UR QL STRIP: ABNORMAL
HYALINE CASTS #/AREA URNS LPF: ABNORMAL /LPF
KETONES UR QL STRIP: NEGATIVE
LEUKOCYTE ESTERASE UR QL STRIP: 250
MUCOUS THREADS URNS QL MICRO: ABNORMAL /LPF
NITRITE UR QL STRIP: NEGATIVE
PH UR STRIP: 5.5 [PH]
PROT UR QL STRIP: NEGATIVE
RBC #/AREA URNS AUTO: ABNORMAL /HPF
SP GR UR STRIP.AUTO: 1.02 (ref 1–1.03)
SQUAMOUS #/AREA URNS LPF: ABNORMAL /HPF
UROBILINOGEN UR STRIP-ACNC: NORMAL
WBC #/AREA URNS AUTO: ABNORMAL /HPF

## 2024-11-04 PROCEDURE — 3074F SYST BP LT 130 MM HG: CPT | Mod: CPTII,,, | Performed by: INTERNAL MEDICINE

## 2024-11-04 PROCEDURE — 3008F BODY MASS INDEX DOCD: CPT | Mod: CPTII,,, | Performed by: INTERNAL MEDICINE

## 2024-11-04 PROCEDURE — 99213 OFFICE O/P EST LOW 20 MIN: CPT | Mod: 25,,, | Performed by: INTERNAL MEDICINE

## 2024-11-04 PROCEDURE — 1159F MED LIST DOCD IN RCRD: CPT | Mod: CPTII,,, | Performed by: INTERNAL MEDICINE

## 2024-11-04 PROCEDURE — 3078F DIAST BP <80 MM HG: CPT | Mod: CPTII,,, | Performed by: INTERNAL MEDICINE

## 2024-11-04 PROCEDURE — 4010F ACE/ARB THERAPY RXD/TAKEN: CPT | Mod: CPTII,,, | Performed by: INTERNAL MEDICINE

## 2024-11-04 PROCEDURE — G0439 PPPS, SUBSEQ VISIT: HCPCS | Mod: ,,, | Performed by: INTERNAL MEDICINE

## 2024-11-04 RX ORDER — LEVOCETIRIZINE DIHYDROCHLORIDE 5 MG/1
TABLET, FILM COATED ORAL
COMMUNITY
Start: 2024-08-16

## 2024-11-04 RX ORDER — ROSUVASTATIN CALCIUM 10 MG/1
10 TABLET, COATED ORAL
COMMUNITY

## 2024-11-04 RX ORDER — ASPIRIN 81 MG/1
81 TABLET ORAL
COMMUNITY

## 2024-11-04 NOTE — PROGRESS NOTES
"   Internal Medicine      Patient ID: [id number]     Chief Complaint: Medicare Annual Wellness     HPI:     Eusebia Miramontes is a 64 y.o. female here today for a Medicare Annual Wellness visit and comprehensive Health Risk Assessment.     She was has numerous complaints.  These include fatigue as well as mild sore throat, earache, abdominal pain that is more less generalized but perhaps worse left upper quadrant, pain and numerous joints but especially the SI joints bilaterally.  She has a history of fibromyalgia and abnormal ENMA's  followed by her rheumatologist but not recently.  She was has a history of hyperparathyroidism status post surgical removal of 1 parathyroid adenoma few years ago.  However at last check her PTH level was still elevated.    She also had urinary tract infections, although not necessarily recently.      The following components were reviewed and updated:  Medical history  Family History  Social history  Allergies  Immunizations  Health Maintenance  Patient Care Team  Current Outpatient Medications   Medication Instructions    aspirin (ECOTRIN) 81 mg    clorazepate (TRANXENE) 15 mg, Oral, Nightly PRN    cyanocobalamin (VITAMIN B-12) 100 mcg, Daily    DULoxetine (CYMBALTA) 60 mg, Oral, Daily    DULoxetine (CYMBALTA) 30 mg, Oral    fluticasone propionate (FLONASE) 50 mcg/actuation nasal spray 1 spray, 2 times daily    levocetirizine (XYZAL) 5 MG tablet XyzaL 5 mg tablet, [RxNorm: 743711]    montelukast (SINGULAIR) 10 mg, Nightly    olmesartan (BENICAR) 20 mg, Oral, Daily    RESTASIS 0.05 % ophthalmic emulsion 1 drop, 2 times daily    rosuvastatin (CRESTOR) 10 mg    vitamin D (VITAMIN D3) 1,000 Units, Daily       Subjective:     Review of Systems    12 point review of systems conducted, negative except as stated in the history of present illness. See HPI for details.    Objective:     Visit Vitals  /78   Pulse 106   Resp 18   Ht 5' 1" (1.549 m)   Wt 64.8 kg (142 lb 12.8 oz)   SpO2 99%   BMI " 26.98 kg/m²       Physical Exam  Vitals reviewed.   Constitutional:       Appearance: Normal appearance.   HENT:      Head: Normocephalic.      Nose: Nose normal.      Mouth/Throat:      Pharynx: Oropharynx is clear.   Eyes:      Pupils: Pupils are equal, round, and reactive to light.   Neck:      Thyroid: No thyromegaly.   Cardiovascular:      Rate and Rhythm: Normal rate and regular rhythm.      Pulses: Normal pulses.   Pulmonary:      Breath sounds: Normal breath sounds.   Abdominal:      General: Abdomen is flat. Bowel sounds are normal.      Palpations: Abdomen is soft. There is no hepatomegaly, splenomegaly or mass.      Tenderness: There is no abdominal tenderness.   Musculoskeletal:      Cervical back: Neck supple.   Lymphadenopathy:      Cervical: No cervical adenopathy.   Skin:     General: Skin is warm and dry.   Neurological:      Mental Status: She is alert.     CBC TSH lipid profile are all fine.    Assessment:     1. Wellness     2. Hypertension.  Good control     3. Hyperlipidemia.  Numbers are fine.  On statin therapy per Cardiology     4. History of hyperparathyroidism.  Status post removal of adenoma.  Last PTH mildly elevated     5. History of positive lupus test as well as diagnosis of fibromyalgia.    6. Fatigue     7. Depression         Plan:     Check UA today.  We also need to check her chemistry profile as well as an HLA B27, sed rate, CRP and PTH level.    Follow-up with me to be determined after review valve standing lab.    [] ACP Discussed - Has Living Will and HCPOA on file. Will provide our office with copies.  [x] ACP Discussion declined.    A comprehensive HEALTH RISK ASSESSMENT was completed today. Results are summarized below:                  The patient is NOT A TOBACCO USER.        All Questions regarding food, transportation or housing were not answered today.    Orders Placed This Encounter   Procedures    X-Ray Sacroiliac Joints Complete     Standing Status:   Future      Standing Expiration Date:   11/4/2025     Order Specific Question:   May the Radiologist modify the order per protocol to meet the clinical needs of the patient?     Answer:   Yes     Order Specific Question:   Release to patient     Answer:   Immediate    HLA B27 Antigen     Standing Status:   Future     Standing Expiration Date:   2/2/2026     Order Specific Question:   Send normal result to authorizing provider's In Basket if patient is active on MyChart:     Answer:   Yes    Comprehensive Metabolic Panel     Standing Status:   Future     Standing Expiration Date:   2/4/2025    Urinalysis     Standing Status:   Future     Number of Occurrences:   1     Standing Expiration Date:   11/4/2025    PTH, Intact     Standing Status:   Future     Standing Expiration Date:   2/2/2026     Order Specific Question:   Send normal result to authorizing provider's In Basket if patient is active on MyChart:     Answer:   Yes    Sedimentation rate     Standing Status:   Future     Standing Expiration Date:   2/4/2025    CRP, High Sensitivity     Standing Status:   Future     Standing Expiration Date:   2/2/2026     Order Specific Question:   Send normal result to authorizing provider's In Basket if patient is active on MyChart:     Answer:   Yes        Medication List with Changes/Refills   Current Medications    ASPIRIN (ECOTRIN) 81 MG EC TABLET    Take 81 mg by mouth.       Start Date: --        End Date: --    CLORAZEPATE (TRANXENE) 15 MG TABLET    Take 1 tablet (15 mg total) by mouth nightly as needed for Insomnia.       Start Date: 2/27/2024 End Date: --    CYANOCOBALAMIN (VITAMIN B-12) 100 MCG TABLET    Take 100 mcg by mouth once daily.       Start Date: --        End Date: --    DULOXETINE (CYMBALTA) 30 MG CAPSULE    Take 1 capsule by mouth once daily       Start Date: 5/30/2024 End Date: --    DULOXETINE (CYMBALTA) 60 MG CAPSULE    Take 1 capsule (60 mg total) by mouth once daily.       Start Date: 5/15/2024 End Date: --     FLUTICASONE PROPIONATE (FLONASE) 50 MCG/ACTUATION NASAL SPRAY    1 spray by Each Nostril route 2 (two) times daily.       Start Date: 5/19/2023 End Date: --    LEVOCETIRIZINE (XYZAL) 5 MG TABLET    XyzaL 5 mg tablet, [RxNorm: 853251]       Start Date: 8/16/2024 End Date: --    MONTELUKAST (SINGULAIR) 10 MG TABLET    Take 10 mg by mouth every evening.       Start Date: --        End Date: --    OLMESARTAN (BENICAR) 20 MG TABLET    Take 1 tablet (20 mg total) by mouth once daily.       Start Date: 9/23/2024 End Date: --    RESTASIS 0.05 % OPHTHALMIC EMULSION    Place 1 drop into both eyes 2 (two) times daily.       Start Date: 12/7/2023 End Date: --    ROSUVASTATIN (CRESTOR) 10 MG TABLET    Take 10 mg by mouth.       Start Date: --        End Date: --    VITAMIN D (VITAMIN D3) 1000 UNITS TAB    Take 1,000 Units by mouth once daily.       Start Date: --        End Date: --   Discontinued Medications    MUPIROCIN (BACTROBAN) 2 % OINTMENT    Apply topically 3 (three) times daily.       Start Date: 1/3/2024  End Date: 11/4/2024    NITROFURANTOIN, MACROCRYSTAL-MONOHYDRATE, (MACROBID) 100 MG CAPSULE    Take 1 capsule (100 mg total) by mouth once daily.       Start Date: 6/22/2023 End Date: 11/4/2024        Provided patient with a 5-10 year written screening schedule and personal prevention plan. Recommendations were developed using the USPSTF age appropriate recommendations. Education, counseling, and referrals were provided as needed. After Visit Summary printed and given to patient, which includes a list of additional screenings\tests needed.    No follow-ups on file. In addition to their scheduled follow up, the patient has also been instructed to follow up on as needed basis.     Future Appointments   Date Time Provider Department Center   1/6/2025  1:00 PM [facility] MAMMO1 SCR1 [facility] [place] [place]   7/8/2025  1:00 PM [facility] MRI1 [address] LIM[address] [facility]   7/15/2025 11:20 AM Barbi Cardona PA-C  [facility] Maicol Molina MD

## 2024-11-06 ENCOUNTER — HOSPITAL ENCOUNTER (OUTPATIENT)
Dept: RADIOLOGY | Facility: HOSPITAL | Age: 64
Discharge: HOME | End: 2024-11-06
Attending: INTERNAL MEDICINE
Payer: MEDICARE

## 2024-11-06 DIAGNOSIS — M79.7 FIBROMYALGIA: ICD-10-CM

## 2024-11-06 DIAGNOSIS — M45.A7 NON-RADIOGRAPHIC AXIAL SPONDYLOARTHRITIS OF LUMBOSACRAL REGION: ICD-10-CM

## 2024-11-06 DIAGNOSIS — M06.4 INFLAMMATORY POLYARTHROPATHY: ICD-10-CM

## 2024-11-06 PROCEDURE — 72202 X-RAY EXAM SI JOINTS 3/> VWS: CPT | Mod: TC

## 2024-11-07 ENCOUNTER — TELEPHONE (OUTPATIENT)
Dept: INTERNAL MEDICINE | Facility: CLINIC | Age: 64
End: 2024-11-07
Payer: MEDICARE

## 2024-11-07 DIAGNOSIS — N30.01 ACUTE CYSTITIS WITH HEMATURIA: Primary | ICD-10-CM

## 2024-11-07 NOTE — TELEPHONE ENCOUNTER
----- Message from Laron sent at 11/7/2024  8:13 AM CST -----  .Who Called: Eusebia Duhon    Caller is requesting information on test results.    Name of Test (Lab/Mammo/Etc): UA, Blood work and Xray    Date of Test: 11/04/2024 and 11/06/2024    Where the test was performed: In office and [facility]    Ordering Provider:  Dr. Molina    Preferred Method of Contact: Phone Call    Patient's Preferred Phone Number on File: [phone]     Best Call Back Number, if different:    Additional Information:

## 2024-11-07 NOTE — TELEPHONE ENCOUNTER
----- Message from Leobardo Molina MD sent at 11/6/2024  5:07 PM CST -----  Blood Work is good.  PTH level it was still mildly elevated but much better and almost normal.  Urine test however does show a few blood cells.  That is not normal.  Recommend we get an ultrasound of the kidneys to further assess.  X-ray interpretation pending.  HLA B27 blood tests pending.  ----- Message -----  From: Lab, Background User  Sent: 11/6/2024  11:30 AM CST  To: Leobardo Molina MD

## 2024-11-11 ENCOUNTER — TELEPHONE (OUTPATIENT)
Dept: INTERNAL MEDICINE | Facility: CLINIC | Age: 64
End: 2024-11-11
Payer: MEDICARE

## 2024-11-11 NOTE — TELEPHONE ENCOUNTER
----- Message from Leobardo Molina MD sent at 11/11/2024  9:23 AM CST -----  Tell her x-rays of the SI joints and blood tests for sacroiliitis are normal.  ----- Message -----  From: Interface, Rad Results In  Sent: 11/8/2024  12:57 PM CST  To: Leobardo Molina MD

## 2024-11-13 DIAGNOSIS — G47.00 INSOMNIA, UNSPECIFIED TYPE: Primary | ICD-10-CM

## 2024-11-13 RX ORDER — CLORAZEPATE DIPOTASSIUM 15 MG/1
15 TABLET ORAL NIGHTLY PRN
Qty: 15 TABLET | Refills: 5 | Status: SHIPPED | OUTPATIENT
Start: 2024-11-13

## 2024-12-06 ENCOUNTER — TELEPHONE (OUTPATIENT)
Dept: INTERNAL MEDICINE | Facility: CLINIC | Age: 64
End: 2024-12-06
Payer: MEDICARE

## 2024-12-06 DIAGNOSIS — B00.1 COLD SORE: Primary | ICD-10-CM

## 2024-12-06 RX ORDER — ACYCLOVIR 400 MG/1
400 TABLET ORAL 3 TIMES DAILY
Qty: 21 TABLET | Refills: 2 | Status: SHIPPED | OUTPATIENT
Start: 2024-12-06

## 2024-12-06 NOTE — TELEPHONE ENCOUNTER
----- Message from Eduardo sent at 12/6/2024  8:58 AM CST -----  Who Called: Eusebia Miramontes    Refill or New Rx:Refill  RX Name and Strength:valACYclovir (VALTREX) 1000 MG tablet  How is the patient currently taking it? (ex. 1XDay):  Is this a 30 day or 90 day RX:  Local or Mail Order:local  List of preferred pharmacies on file (remove unneeded): [place] [address]   Phone: [phone]  Fax: [phone]        If different Pharmacy is requested, enter Pharmacy information here including location and phone number:    Ordering Provider:      Preferred Method of Contact: Phone Call  Patient's Preferred Phone Number on File: [phone]   Best Call Back Number, if different:  Additional Information: Pt is requesting a refill on medication, states she spoke with PCP at last office visit about refilling medication. Pt states she has fever blisters and OTC cream is not working, stress is making it worse she states.

## 2024-12-11 ENCOUNTER — TELEPHONE (OUTPATIENT)
Dept: INTERNAL MEDICINE | Facility: CLINIC | Age: 64
End: 2024-12-11
Payer: MEDICARE

## 2024-12-11 NOTE — TELEPHONE ENCOUNTER
----- Message from Leobardo Molnia MD sent at 12/10/2024  5:13 PM CST -----  Tell her ultrasound of the kidneys normal  ----- Message -----  From: Interface, Rad Results In  Sent: 12/10/2024   2:52 PM CST  To: Leobardo Molina MD

## 2025-01-31 ENCOUNTER — HOSPITAL ENCOUNTER (OUTPATIENT)
Dept: RADIOLOGY | Facility: HOSPITAL | Age: 65
Discharge: HOME | End: 2025-01-31
Payer: MEDICARE

## 2025-01-31 DIAGNOSIS — Z12.31 SCREENING MAMMOGRAM FOR BREAST CANCER: ICD-10-CM

## 2025-01-31 DIAGNOSIS — N64.89 COMPLEX SCLEROSING LESION OF LEFT BREAST: ICD-10-CM

## 2025-01-31 DIAGNOSIS — Z91.89 AT HIGH RISK FOR BREAST CANCER: ICD-10-CM

## 2025-01-31 DIAGNOSIS — N60.92 ATYPICAL DUCTAL HYPERPLASIA OF LEFT BREAST: ICD-10-CM

## 2025-01-31 PROCEDURE — 77067 SCR MAMMO BI INCL CAD: CPT | Mod: TC

## 2025-01-31 PROCEDURE — 77067 SCR MAMMO BI INCL CAD: CPT | Mod: 26,,, | Performed by: RADIOLOGY

## 2025-01-31 PROCEDURE — 77063 BREAST TOMOSYNTHESIS BI: CPT | Mod: 26,,, | Performed by: RADIOLOGY

## 2025-03-17 ENCOUNTER — OFFICE VISIT (OUTPATIENT)
Dept: INTERNAL MEDICINE | Facility: CLINIC | Age: 65
End: 2025-03-17
Payer: MEDICARE

## 2025-03-17 VITALS
RESPIRATION RATE: 16 BRPM | SYSTOLIC BLOOD PRESSURE: 116 MMHG | BODY MASS INDEX: 28.01 KG/M2 | HEART RATE: 83 BPM | HEIGHT: 61 IN | WEIGHT: 148.38 LBS | OXYGEN SATURATION: 96 % | DIASTOLIC BLOOD PRESSURE: 78 MMHG

## 2025-03-17 DIAGNOSIS — E21.3 HYPERPARATHYROIDISM, UNSPECIFIED: ICD-10-CM

## 2025-03-17 DIAGNOSIS — R53.83 FATIGUE, UNSPECIFIED TYPE: ICD-10-CM

## 2025-03-17 DIAGNOSIS — M25.50 ARTHRALGIA, UNSPECIFIED JOINT: ICD-10-CM

## 2025-03-17 DIAGNOSIS — I10 PRIMARY HYPERTENSION: Primary | ICD-10-CM

## 2025-03-17 DIAGNOSIS — R10.9 ABDOMINAL PAIN, UNSPECIFIED ABDOMINAL LOCATION: ICD-10-CM

## 2025-03-17 DIAGNOSIS — M06.4 INFLAMMATORY POLYARTHROPATHY: ICD-10-CM

## 2025-03-17 DIAGNOSIS — E78.5 HYPERLIPIDEMIA, UNSPECIFIED HYPERLIPIDEMIA TYPE: ICD-10-CM

## 2025-03-17 PROCEDURE — 99214 OFFICE O/P EST MOD 30 MIN: CPT | Mod: ,,, | Performed by: INTERNAL MEDICINE

## 2025-03-17 PROCEDURE — 1159F MED LIST DOCD IN RCRD: CPT | Mod: CPTII,,, | Performed by: INTERNAL MEDICINE

## 2025-03-17 PROCEDURE — 4010F ACE/ARB THERAPY RXD/TAKEN: CPT | Mod: CPTII,,, | Performed by: INTERNAL MEDICINE

## 2025-03-17 PROCEDURE — 3078F DIAST BP <80 MM HG: CPT | Mod: CPTII,,, | Performed by: INTERNAL MEDICINE

## 2025-03-17 PROCEDURE — 3074F SYST BP LT 130 MM HG: CPT | Mod: CPTII,,, | Performed by: INTERNAL MEDICINE

## 2025-03-17 PROCEDURE — 3008F BODY MASS INDEX DOCD: CPT | Mod: CPTII,,, | Performed by: INTERNAL MEDICINE

## 2025-03-17 RX ORDER — TIZANIDINE 2 MG/1
2 TABLET ORAL EVERY 6 HOURS PRN
Qty: 30 TABLET | Refills: 3 | Status: SHIPPED | OUTPATIENT
Start: 2025-03-17

## 2025-03-17 RX ORDER — PANTOPRAZOLE SODIUM 40 MG/1
40 TABLET, DELAYED RELEASE ORAL DAILY
Qty: 30 TABLET | Refills: 3 | Status: SHIPPED | OUTPATIENT
Start: 2025-03-17 | End: 2026-03-17

## 2025-03-17 NOTE — PROGRESS NOTES
Patient ID: [id number]      Subjective:     Chief Complaint: Neck Pain and Abdominal Pain      Eusebia Miramontes is a 64 y.o. female.  Is a 64-year-old woman who does not feel well in general.  She also has specific complaints.  The 1st is abdominal pain.  In the epigastrium.  She feels like that has a hole in her stomach.  When she eats the feels like the whole gets filled up.  Then she has lots of belching.  She has not been losing weight.  In the past she had had an upper endoscopy done and was put on Protonix and that is seemed to help.  She does not have anymore.      Also complaining of increasing neck pain.  Muscle relaxer seemed to have helped in the past.  She was told by an orthopedic specialist in the past that she needed surgery.    Recently has a sore tongue that lasted about 3 weeks.  Now resolved.    Neck Pain     Abdominal Pain        Review of Systems   Gastrointestinal:  Positive for abdominal pain.   Musculoskeletal:  Positive for neck pain.       Outpatient Medications Marked as Taking for the 3/17/25 encounter (Office Visit) with Leobardo Molina MD   Medication Sig Dispense Refill    acyclovir (ZOVIRAX) 400 MG tablet Take 1 tablet (400 mg total) by mouth 3 (three) times daily. 21 tablet 2    aspirin (ECOTRIN) 81 MG EC tablet Take 81 mg by mouth.      clorazepate (TRANXENE) 15 MG tablet Take 1 tablet (15 mg total) by mouth nightly as needed for Insomnia. 15 tablet 5    cyanocobalamin (VITAMIN B-12) 100 MCG tablet Take 100 mcg by mouth once daily.      DULoxetine (CYMBALTA) 60 MG capsule Take 1 capsule (60 mg total) by mouth once daily. 30 capsule 11    fluticasone propionate (FLONASE) 50 mcg/actuation nasal spray 1 spray by Each Nostril route 2 (two) times daily.      levocetirizine (XYZAL) 5 MG tablet XyzaL 5 mg tablet, [RxNorm: 405820]      montelukast (SINGULAIR) 10 mg tablet Take 10 mg by mouth every evening.      olmesartan (BENICAR) 20 MG tablet Take 1 tablet (20 mg total) by mouth once daily.  "30 tablet 11    RESTASIS 0.05 % ophthalmic emulsion Place 1 drop into both eyes 2 (two) times daily.      rosuvastatin (CRESTOR) 10 MG tablet Take 10 mg by mouth.      vitamin D (VITAMIN D3) 1000 units Tab Take 1,000 Units by mouth once daily.      [DISCONTINUED] DULoxetine (CYMBALTA) 30 MG capsule Take 1 capsule by mouth once daily 210 capsule 1       Objective:     /78 (BP Location: Right arm, Patient Position: Sitting)   Pulse 83   Resp 16   Ht 5' 1" (1.549 m)   Wt 67.3 kg (148 lb 6.4 oz)   SpO2 96%   BMI 28.04 kg/m²     Physical Exam  Vitals reviewed.   Constitutional:       Appearance: Normal appearance.   HENT:      Head: Normocephalic.      Nose: Nose normal.      Mouth/Throat:      Pharynx: Oropharynx is clear.      Comments: Throat and tongue are clear.  Eyes:      Pupils: Pupils are equal, round, and reactive to light.   Neck:      Thyroid: No thyromegaly.   Cardiovascular:      Rate and Rhythm: Normal rate and regular rhythm.      Pulses: Normal pulses.   Pulmonary:      Breath sounds: Normal breath sounds.   Abdominal:      General: Abdomen is flat. Bowel sounds are normal.      Palpations: Abdomen is soft. There is no hepatomegaly, splenomegaly or mass.      Tenderness: There is no abdominal tenderness.   Musculoskeletal:      Cervical back: Neck supple.   Lymphadenopathy:      Cervical: No cervical adenopathy.   Skin:     General: Skin is warm and dry.   Neurological:      Mental Status: She is alert.         Assessment:    1. Malaise and fatigue.  Multifactorial and mostly nonspecific     2. Epigastric pain.  Perhaps gastritis or even peptic ulcer disease.  Similar problems in the past.  Okay for therapeutic trial of proton pump inhibitor therapy     3. Neck pain.  Likely degenerative disease.  Responded to muscle relaxers in the past     4. History of hyperparathyroidism.  Last PTH near normal , status post removal of parathyroid adenoma    5.  History of inflammatory arthritis, " previously followed by Dr. Juan kwok.    Plan:   Empiric trial of Protonix 40 daily and Zanaflex 2 mg t.i.d. p.r.n. muscle neck pain.  If abdominal pain that has not resolve I would proceed with a CT abdomen.  Otherwise follow-up 3 months with CBC CMP lipid TSH and PTH level prior  Problem List Items Addressed This Visit          Cardiac/Vascular    Hyperlipidemia    Relevant Orders    CBC Auto Differential    Comprehensive Metabolic Panel    Lipid Panel    TSH    PTH, Intact    HTN (hypertension) - Primary    Relevant Orders    CBC Auto Differential    Comprehensive Metabolic Panel    Lipid Panel    TSH    PTH, Intact       Endocrine    Hyperparathyroidism, unspecified    Relevant Orders    CBC Auto Differential    Comprehensive Metabolic Panel    Lipid Panel    TSH    PTH, Intact       GI    Abdominal pain    Relevant Orders    CBC Auto Differential    Comprehensive Metabolic Panel    Lipid Panel    TSH    PTH, Intact     Other Visit Diagnoses         Inflammatory polyarthropathy        Relevant Orders    CBC Auto Differential    Comprehensive Metabolic Panel    Lipid Panel    TSH    PTH, Intact      Arthralgia, unspecified joint        Relevant Orders    CBC Auto Differential    Comprehensive Metabolic Panel    Lipid Panel    TSH    PTH, Intact      Fatigue, unspecified type        Relevant Orders    CBC Auto Differential    Comprehensive Metabolic Panel    Lipid Panel    TSH    PTH, Intact             Orders Placed This Encounter   Procedures    CBC Auto Differential     Standing Status:   Future     Expected Date:   6/17/2025     Expiration Date:   3/17/2026    Comprehensive Metabolic Panel     Standing Status:   Future     Expected Date:   6/17/2025     Expiration Date:   3/17/2026    Lipid Panel     Standing Status:   Future     Expected Date:   6/17/2025     Expiration Date:   3/17/2026    TSH     Standing Status:   Future     Expected Date:   6/17/2025     Expiration Date:   3/17/2026    PTH, Intact      Standing Status:   Future     Expected Date:   6/17/2025     Expiration Date:   6/15/2026     Send normal result to authorizing provider's In Basket if patient is active on MyChart::   Yes        Medication List with Changes/Refills   New Medications    PANTOPRAZOLE (PROTONIX) 40 MG TABLET    Take 1 tablet (40 mg total) by mouth once daily.       Start Date: 3/17/2025 End Date: 3/17/2026    TIZANIDINE (ZANAFLEX) 2 MG TABLET    Take 1 tablet (2 mg total) by mouth every 6 (six) hours as needed.       Start Date: 3/17/2025 End Date: --   Current Medications    ACYCLOVIR (ZOVIRAX) 400 MG TABLET    Take 1 tablet (400 mg total) by mouth 3 (three) times daily.       Start Date: 12/6/2024 End Date: --    ASPIRIN (ECOTRIN) 81 MG EC TABLET    Take 81 mg by mouth.       Start Date: --        End Date: --    CLORAZEPATE (TRANXENE) 15 MG TABLET    Take 1 tablet (15 mg total) by mouth nightly as needed for Insomnia.       Start Date: 11/13/2024End Date: --    CYANOCOBALAMIN (VITAMIN B-12) 100 MCG TABLET    Take 100 mcg by mouth once daily.       Start Date: --        End Date: --    DULOXETINE (CYMBALTA) 60 MG CAPSULE    Take 1 capsule (60 mg total) by mouth once daily.       Start Date: 5/15/2024 End Date: --    FLUTICASONE PROPIONATE (FLONASE) 50 MCG/ACTUATION NASAL SPRAY    1 spray by Each Nostril route 2 (two) times daily.       Start Date: 5/19/2023 End Date: --    LEVOCETIRIZINE (XYZAL) 5 MG TABLET    XyzaL 5 mg tablet, [RxNorm: 095493]       Start Date: 8/16/2024 End Date: --    MONTELUKAST (SINGULAIR) 10 MG TABLET    Take 10 mg by mouth every evening.       Start Date: --        End Date: --    OLMESARTAN (BENICAR) 20 MG TABLET    Take 1 tablet (20 mg total) by mouth once daily.       Start Date: 9/23/2024 End Date: --    RESTASIS 0.05 % OPHTHALMIC EMULSION    Place 1 drop into both eyes 2 (two) times daily.       Start Date: 12/7/2023 End Date: --    ROSUVASTATIN (CRESTOR) 10 MG TABLET    Take 10 mg by mouth.       Start  Date: --        End Date: --    VITAMIN D (VITAMIN D3) 1000 UNITS TAB    Take 1,000 Units by mouth once daily.       Start Date: --        End Date: --   Discontinued Medications    DULOXETINE (CYMBALTA) 30 MG CAPSULE    Take 1 capsule by mouth once daily       Start Date: 5/30/2024 End Date: 3/17/2025            Follow up in about 3 months (around 6/17/2025). In addition to their scheduled follow up, the patient has also been instructed to follow up on as needed basis.       Leobardo Molina

## 2025-05-09 ENCOUNTER — OFFICE VISIT (OUTPATIENT)
Dept: INTERNAL MEDICINE | Facility: CLINIC | Age: 65
End: 2025-05-09
Payer: MEDICARE

## 2025-05-09 VITALS
BODY MASS INDEX: 27.75 KG/M2 | OXYGEN SATURATION: 99 % | HEIGHT: 61 IN | SYSTOLIC BLOOD PRESSURE: 115 MMHG | WEIGHT: 147 LBS | HEART RATE: 73 BPM | DIASTOLIC BLOOD PRESSURE: 69 MMHG | RESPIRATION RATE: 18 BRPM

## 2025-05-09 DIAGNOSIS — N30.90 CYSTITIS: Primary | ICD-10-CM

## 2025-05-09 DIAGNOSIS — R30.0 DYSURIA: Primary | ICD-10-CM

## 2025-05-09 DIAGNOSIS — R10.9 FLANK PAIN: ICD-10-CM

## 2025-05-09 DIAGNOSIS — R30.0 DYSURIA: ICD-10-CM

## 2025-05-09 LAB
BACTERIA #/AREA URNS AUTO: ABNORMAL /HPF
BILIRUB UR QL STRIP.AUTO: NEGATIVE
CLARITY UR: CLEAR
COLOR UR AUTO: ABNORMAL
GLUCOSE UR QL STRIP: NORMAL
HGB UR QL STRIP: ABNORMAL
KETONES UR QL STRIP: NEGATIVE
LEUKOCYTE ESTERASE UR QL STRIP: 75
MUCOUS THREADS URNS QL MICRO: ABNORMAL /LPF
NITRITE UR QL STRIP: NEGATIVE
PH UR STRIP: 6.5 [PH]
PROT UR QL STRIP: NEGATIVE
RBC #/AREA URNS AUTO: ABNORMAL /HPF
SP GR UR STRIP.AUTO: 1.01 (ref 1–1.03)
SQUAMOUS #/AREA URNS LPF: ABNORMAL /HPF
UROBILINOGEN UR STRIP-ACNC: NORMAL
WBC #/AREA URNS AUTO: ABNORMAL /HPF

## 2025-05-09 PROCEDURE — 81001 URINALYSIS AUTO W/SCOPE: CPT | Performed by: INTERNAL MEDICINE

## 2025-05-09 RX ORDER — CEFDINIR 300 MG/1
300 CAPSULE ORAL 2 TIMES DAILY
Qty: 10 CAPSULE | Refills: 0 | Status: SHIPPED | OUTPATIENT
Start: 2025-05-09 | End: 2025-05-14

## 2025-05-09 NOTE — PROGRESS NOTES
Patient ID: [id number]      Subjective:     Chief Complaint: Facial Pain (Sinus pressure, sore throat, urinary frequency since Tuesday)      Eusebia Miramontes is a 64 y.o. female.  The patient is a 64-year-old woman in for new complaints.  Three days ago she started having sinus symptoms.  Nasal congestion in maxillary headache.  Perhaps a low-grade fever.  She has started taking Zyrtec and that is seemed to have abated.  However she is also developing lower abdominal pressure similar to prior bladder infections.  No visible blood in the urine.  No dysuria but she is generally does not have dysuria with UTIs.  She has had some frequency.    Facial Pain        Review of Systems    Outpatient Medications Marked as Taking for the 5/9/25 encounter (Office Visit) with Leobardo Molina MD   Medication Sig Dispense Refill    acyclovir (ZOVIRAX) 400 MG tablet Take 1 tablet (400 mg total) by mouth 3 (three) times daily. 21 tablet 2    aspirin (ECOTRIN) 81 MG EC tablet Take 81 mg by mouth.      clorazepate (TRANXENE) 15 MG tablet Take 1 tablet (15 mg total) by mouth nightly as needed for Insomnia. 15 tablet 5    cyanocobalamin (VITAMIN B-12) 100 MCG tablet Take 100 mcg by mouth once daily.      DULoxetine (CYMBALTA) 60 MG capsule Take 1 capsule (60 mg total) by mouth once daily. 30 capsule 11    fluticasone propionate (FLONASE) 50 mcg/actuation nasal spray 1 spray by Each Nostril route 2 (two) times daily.      levocetirizine (XYZAL) 5 MG tablet XyzaL 5 mg tablet, [RxNorm: 017547]      montelukast (SINGULAIR) 10 mg tablet Take 10 mg by mouth every evening.      olmesartan (BENICAR) 20 MG tablet Take 1 tablet (20 mg total) by mouth once daily. 30 tablet 11    pantoprazole (PROTONIX) 40 MG tablet Take 1 tablet (40 mg total) by mouth once daily. 30 tablet 3    RESTASIS 0.05 % ophthalmic emulsion Place 1 drop into both eyes 2 (two) times daily.      rosuvastatin (CRESTOR) 10 MG tablet Take 10 mg by mouth.      tiZANidine (ZANAFLEX) 2  "MG tablet Take 1 tablet (2 mg total) by mouth every 6 (six) hours as needed. 30 tablet 3    vitamin D (VITAMIN D3) 1000 units Tab Take 1,000 Units by mouth once daily.         Objective:     /69 (BP Location: Right arm, Patient Position: Sitting)   Pulse 73   Resp 18   Ht 5' 1" (1.549 m)   Wt 66.7 kg (147 lb)   SpO2 99%   BMI 27.78 kg/m²     Physical Exam  Vitals reviewed.   Constitutional:       Appearance: Normal appearance.   HENT:      Head: Normocephalic.      Nose: Nose normal.      Mouth/Throat:      Pharynx: Oropharynx is clear.   Eyes:      Pupils: Pupils are equal, round, and reactive to light.   Neck:      Thyroid: No thyromegaly.   Cardiovascular:      Rate and Rhythm: Normal rate and regular rhythm.      Pulses: Normal pulses.   Pulmonary:      Breath sounds: Normal breath sounds.   Abdominal:      General: Abdomen is flat. Bowel sounds are normal.      Palpations: Abdomen is soft. There is no hepatomegaly, splenomegaly or mass.      Tenderness: There is no abdominal tenderness.      Comments: Slight suprapubic tenderness.  No right lower quadrant or left lower quadrant tenderness.   Musculoskeletal:      Cervical back: Neck supple.   Lymphadenopathy:      Cervical: No cervical adenopathy.   Skin:     General: Skin is warm and dry.   Neurological:      Mental Status: She is alert.         Assessment:     1. Probable cystitis.  Warrants empiric treatment giving current logistics (late clinic on Friday)    2. Allergic rhinitis versus URI.  I favor the former.      Plan:   Add Omnicef 300 b.i.d. x5 days.  Continue Zyrtec.  If things do not improve we can check a urine tests next week  Problem List Items Addressed This Visit    None  Visit Diagnoses         Cystitis    -  Primary      Dysuria          Flank pain                 No orders of the defined types were placed in this encounter.       Medication List with Changes/Refills   New Medications    CEFDINIR (OMNICEF) 300 MG CAPSULE    Take 1 " capsule (300 mg total) by mouth 2 (two) times daily. for 5 days       Start Date: 5/9/2025  End Date: 5/14/2025   Current Medications    ACYCLOVIR (ZOVIRAX) 400 MG TABLET    Take 1 tablet (400 mg total) by mouth 3 (three) times daily.       Start Date: 12/6/2024 End Date: --    ASPIRIN (ECOTRIN) 81 MG EC TABLET    Take 81 mg by mouth.       Start Date: --        End Date: --    CLORAZEPATE (TRANXENE) 15 MG TABLET    Take 1 tablet (15 mg total) by mouth nightly as needed for Insomnia.       Start Date: 11/13/2024End Date: --    CYANOCOBALAMIN (VITAMIN B-12) 100 MCG TABLET    Take 100 mcg by mouth once daily.       Start Date: --        End Date: --    DULOXETINE (CYMBALTA) 60 MG CAPSULE    Take 1 capsule (60 mg total) by mouth once daily.       Start Date: 5/15/2024 End Date: --    FLUTICASONE PROPIONATE (FLONASE) 50 MCG/ACTUATION NASAL SPRAY    1 spray by Each Nostril route 2 (two) times daily.       Start Date: 5/19/2023 End Date: --    LEVOCETIRIZINE (XYZAL) 5 MG TABLET    XyzaL 5 mg tablet, [RxNorm: 101355]       Start Date: 8/16/2024 End Date: --    MONTELUKAST (SINGULAIR) 10 MG TABLET    Take 10 mg by mouth every evening.       Start Date: --        End Date: --    OLMESARTAN (BENICAR) 20 MG TABLET    Take 1 tablet (20 mg total) by mouth once daily.       Start Date: 9/23/2024 End Date: --    PANTOPRAZOLE (PROTONIX) 40 MG TABLET    Take 1 tablet (40 mg total) by mouth once daily.       Start Date: 3/17/2025 End Date: 3/17/2026    RESTASIS 0.05 % OPHTHALMIC EMULSION    Place 1 drop into both eyes 2 (two) times daily.       Start Date: 12/7/2023 End Date: --    ROSUVASTATIN (CRESTOR) 10 MG TABLET    Take 10 mg by mouth.       Start Date: --        End Date: --    TIZANIDINE (ZANAFLEX) 2 MG TABLET    Take 1 tablet (2 mg total) by mouth every 6 (six) hours as needed.       Start Date: 3/17/2025 End Date: --    VITAMIN D (VITAMIN D3) 1000 UNITS TAB    Take 1,000 Units by mouth once daily.       Start Date: --         End Date: --            Follow up if symptoms worsen or fail to improve. In addition to their scheduled follow up, the patient has also been instructed to follow up on as needed basis.       Leobardo Molina

## 2025-05-19 ENCOUNTER — DOCUMENTATION ONLY (OUTPATIENT)
Facility: CLINIC | Age: 65
End: 2025-05-19
Payer: MEDICARE

## 2025-06-17 DIAGNOSIS — G47.00 INSOMNIA, UNSPECIFIED TYPE: ICD-10-CM

## 2025-06-17 RX ORDER — CLORAZEPATE DIPOTASSIUM 15 MG/1
15 TABLET ORAL NIGHTLY
Qty: 15 TABLET | Refills: 1 | Status: SHIPPED | OUTPATIENT
Start: 2025-06-17

## 2025-07-02 ENCOUNTER — TELEPHONE (OUTPATIENT)
Dept: INTERNAL MEDICINE | Facility: CLINIC | Age: 65
End: 2025-07-02
Payer: MEDICARE

## 2025-07-02 NOTE — TELEPHONE ENCOUNTER
----- Message from Nurse Irma sent at 7/2/2025  1:16 PM CDT -----  Regarding: ketty King 07/10 @11:00  Fasting labs needed.  
Yes

## 2025-07-09 DIAGNOSIS — F41.9 ANXIETY AND DEPRESSION: ICD-10-CM

## 2025-07-09 DIAGNOSIS — K21.9 GASTROESOPHAGEAL REFLUX DISEASE, UNSPECIFIED WHETHER ESOPHAGITIS PRESENT: Primary | ICD-10-CM

## 2025-07-09 DIAGNOSIS — F32.A ANXIETY AND DEPRESSION: ICD-10-CM

## 2025-07-09 RX ORDER — PANTOPRAZOLE SODIUM 40 MG/1
40 TABLET, DELAYED RELEASE ORAL
Qty: 90 TABLET | Refills: 0 | Status: SHIPPED | OUTPATIENT
Start: 2025-07-09

## 2025-07-23 RX ORDER — DULOXETIN HYDROCHLORIDE 60 MG/1
60 CAPSULE, DELAYED RELEASE ORAL
Qty: 90 CAPSULE | Refills: 0 | Status: SHIPPED | OUTPATIENT
Start: 2025-07-23

## 2025-07-29 NOTE — PROGRESS NOTES
[facility] Breast Surg  Breast Surgical Oncology  Follow Up Patient Office Visit       Referring Provider: Barbi Cardona  PCP: Leobardo Molina MD     Chief Complaint:   Chief Complaint   Patient presents with    Follow-up     Patient denies any breast related concerns      Subjective:     Treatments:  1/13/2023 - Left Breast Excisional Biopsy targeting two adjacent masses in the 5:00 position 6 cmfn    Interval History:  07/30/2025 - Eusebia Miramontes is here for 6 month high risk follow up appointment. She is doing well today. She still complains of bilateral intermittent breast pain which has been going on since the time of her excisional biopsy. She states she has not tried anything to help alleviate breast pain.  She denies any worsening factors.  She has no other breast complaints today. She underwent screening mammogram in February and breast MRI in July which both resulted as benign. She denies any significant interval changes or any changes to family history. She has no other questions or concerns today.     HPI:  Eusebia Miramontes is a 64 y.o. female who presents on 12/27/2022 for evaluation of left breast complex sclerosing lesions. She is s/p left breast excisional biopsy on 1/13/2023. Surgical pathology revealed residual complex sclerosing lesion with focal atypical ductal hyperplasia. Her lifetime risk for breast cancer was re-calculated to include ADH and is found to be elevated according to the Tyrer Cuzick model 22.7% (update 7/30/2025). She met with Dr. Dee and declined chemoprevention with AI or Tamoxifen. She was noted to have a low BMD, and Dr. Dailey started her on Evista, which treats low BMD and also is shown to decrease risk of invasive breast cancer in women at high risk although less effective than Tamoxifen. She did not tolerate the medication well so it was discontinued. She elected to continue high risk screening with supplemental breast MRIs.    She  currently denies any breast issues including rashes, redness, pain, swelling, nipple discharge, or new lumps/masses.    Imagin2022 SCR MG at [facility] - There is a 0.5 cm oval mass with an indistinct margin in the left breast at 6:00 posterior depth. BI-RADS 0: Incomplete: Left diagnostic mammography with tomosynthesis (to include spot compression views), followed by targeted left breast ultrasound if needed.    2022 L DG MG at [facility] - In the 5:00 left breast posterior depth, there is a 0.6 cm oval mass with circumscribed margins, which corresponds to the mass seen in the 6:00 left breast posterior depth on recent screening mammogram. Targeted US in the 5:00 left breast 6 cm from the nipple reveals two adjacent parallel oval avascular heterogeneous masses with circumscribed margins, which are located approximately 1 cm apart. The larger of the two masses, measuring 0.6 cm x 0.3 cm x 0.5 cm, correlates with the oval mass in the 5:00 left breast posterior depth on mammogram.  The second mass measures 0.5 cm x 0.2 cm x 0.4 cm. BIRADS 4: Left breast 5:00 6 cm from the nipple two adjacent oval circumscribed heterogeneous masses are suspicious for malignancy. Ultrasound guided core needle biopsy of the larger of the two masses in the 5:00 left breast 6 cm from the nipple is recommended.    [facility] - 1) No MRI evidence of malignancy in either breast.  2) Left breast lower outer quadrant middle to posterior depths surgical excisional biopsy site demonstrates no abnormal enhancement.  3) 0.5 cm oval circumscribed low T1 signal, high T2 signal, homogeneously enhancing osseous lesion in the central aspect of the manubrium.  This finding is quite small and nonspecific; however, the differential diagnostic possibilities include malignancy or other osseous lesions that may need treatment.    9/15/2023 NM Bone Scan Whole Body - Impression: There is no scintigraphic evidence of metastatic  disease.    2023 SCR MG - 1. The focal asymmetry with an associated calcification in the 6:00 left breast, middle depth, needs further evaluation.  2. The asymmetry in the left breast middle depth superior region seen on the mediolateral oblique view only needs further evaluation.   Recommend a left diagnostic mammogram (to include magnification and spot compression tomosynthesis views) and possible left breast ultrasound if needed.     2023 L DG MG and L breast US - BIRADS 2: BENIGN: 1. No mammographic evidence of malignancy is seen involving the left breast.  No sonographic evidence of malignancy is seen involving the superior, 5:00, 6:00, or 7:00 left breast.2. Fibrocystic changes in the left breast are benign.    2024 Breast MRI - No MRI evidence of malignancy in either breast. BI-RADS: 2 Benign   2/3/2025 SC MG - There is no mammographic evidence of malignancy. BI-RADS: 2 Benign    2025 Breast MRI - No MR evidence of malignancy in either breast. BI-RADS: 2 Benign     Pathology:   2022 - Left Breast 5:00 6 cmfn - Complex sclerosing lesion with moderate duct epithelial hyperplasia, usual type.    Please note that radiology recommends that this pathology is also applied to the additional oval, heterogeneous mass with circumscribed margins in the 5:00 left breast, 6 cm from the nipple.    2023 Left Breast Excisional Biopsy - residual complex sclerosing lesion with focal atypical ductal hyperplasia. The margins are free of atypical proliferation.    OB/GYN History:  Age at Menarche Onset: 15  Menopausal Status: postmenopausal, at age 48  Hysterectomy/Oophorectomy: no  Hormonal birth control (duration): 20 years  Pregnancy History:   Age at first live birth: 23  Hormone Replacement Therapy: Yes, reports progesterone (but likely combination HRT), stopped 5-7 years ago    Other:  # of breast biopsies (when and pathology results): 2022 - [facility]  MG breast density: Category B  Prior  thoracic RT: none  Genetic testing: none  Ashkenazi Restorationist descent: No    Family History:  Family History   Problem Relation Name Age of Onset    Dementia Mother Kait Mariano     Arthritis Mother Kait Mariano     Parkinsonism Mother Kait Mariano     Arthritis Father Carl Daleydoin         Kidney Failure,Heart problems,COPD,    COPD Father Carl Daleydoin     Kidney failure Father Carl Daleydoin     Heart disease Father Carl Quintana     Birth defects Son Enrique Her         Intestines twisted cause of death also he  had cerebral Palsy    Cerebral palsy Son Enrique Her     Stomach cancer Maternal Aunt  76 - 79    Leukemia Paternal Uncle          Patient History:  Past Medical History:   Diagnosis Date    Breast cyst     left breast    Fibromyalgia     GERD (gastroesophageal reflux disease)     Hyperlipidemia     Hypertension     Systemic lupus erythematosus, organ or system involvement unspecified        Past Surgical History:   Procedure Laterality Date    ADENOIDECTOMY      BILATERAL TUBAL LIGATION      BREAST SURGERY Left     EXCISION, MASS, BREAST, USING RADIOLOGICAL MARKER Left 2023    Procedure: EXCISION,MASS,BREAST,USING RADIOLOGICAL MARKER Left;  Surgeon: Carmen Gomez MD;  Location: [facility];  Service: General;  Laterality: Left;    PARATHYROIDECTOMY      TONSILLECTOMY      TUBAL LIGATION  3/3/1997       Social History     Socioeconomic History    Marital status: Legally    Tobacco Use    Smoking status: Former     Current packs/day: 0.00     Average packs/day: 0.3 packs/day for 5.0 years (1.3 ttl pk-yrs)     Types: Cigarettes     Start date: 1985     Quit date: 1990     Years since quittin.7    Smokeless tobacco: Never    Tobacco comments:     Only smoked a pack per week only smoked if I was out   Substance and Sexual Activity    Alcohol use: Not Currently     Comment: socially    Drug use: Not Currently     Types: Marijuana    Sexual activity: Not Currently     Partners: Male      Birth control/protection: None     Social Drivers of Health     Financial Resource Strain: High Risk (10/26/2024)    Received from [facility] SDOH Screening     In the past year, have you been unable to get any of the following when you really needed them? choose all that apply.: Internet       Immunization History   Administered Date(s) Administered    COVID-19, MRNA, LN-S, PF (Pfizer) (Purple Cap) 03/05/2021, 03/26/2021       Medications/Allergies:    Current Outpatient Medications:     acyclovir (ZOVIRAX) 400 MG tablet, Take 1 tablet (400 mg total) by mouth 3 (three) times daily. (Patient taking differently: Take 400 mg by mouth 3 (three) times daily. PRN), Disp: 21 tablet, Rfl: 2    aspirin (ECOTRIN) 81 MG EC tablet, Take 81 mg by mouth., Disp: , Rfl:     clorazepate (TRANXENE) 15 MG tablet, TAKE 1 TABLET BY MOUTH NIGHTLY AS NEEDED FOR INSOMNIA, Disp: 15 tablet, Rfl: 1    cyanocobalamin (VITAMIN B-12) 100 MCG tablet, Take 100 mcg by mouth once daily., Disp: , Rfl:     DULoxetine (CYMBALTA) 60 MG capsule, Take 1 capsule by mouth once daily, Disp: 90 capsule, Rfl: 0    fluticasone propionate (FLONASE) 50 mcg/actuation nasal spray, 1 spray by Each Nostril route 2 (two) times daily., Disp: , Rfl:     levocetirizine (XYZAL) 5 MG tablet, XyzaL 5 mg tablet, [RxNorm: 030133], Disp: , Rfl:     montelukast (SINGULAIR) 10 mg tablet, Take 10 mg by mouth every evening., Disp: , Rfl:     olmesartan (BENICAR) 20 MG tablet, Take 1 tablet (20 mg total) by mouth once daily., Disp: 30 tablet, Rfl: 11    pantoprazole (PROTONIX) 40 MG tablet, Take 1 tablet by mouth once daily, Disp: 90 tablet, Rfl: 0    RESTASIS 0.05 % ophthalmic emulsion, Place 1 drop into both eyes 2 (two) times daily., Disp: , Rfl:     rosuvastatin (CRESTOR) 10 MG tablet, Take 10 mg by mouth., Disp: , Rfl:     tiZANidine (ZANAFLEX) 2 MG tablet, Take 1 tablet (2 mg total) by mouth every 6 (six) hours as needed., Disp: 30 tablet, Rfl: 3    vitamin D  (VITAMIN D3) 1000 units Tab, Take 1,000 Units by mouth once daily., Disp: , Rfl:      Review of patient's allergies indicates:  No Known Allergies    Review of Systems:  All pertinent history in HPI.     Objective:     Vitals:  Vitals:    07/30/25 1135   BP: 108/78   Pulse: 75   Resp: 18   Temp: 98.5 °F (36.9 °C)         Body mass index is 28.08 kg/m².     Physical Exam:  General: The patient is awake, alert and oriented times three. The patient is well nourished and in no acute distress.  Neck: There is no evidence of palpable cervical, supraclavicular or axillary adenopathy. The neck is supple. The thyroid is not enlarged.  Musculoskeletal: The patient has a normal range of motion of her bilateral upper extremities.  Chest: Examination of the chest wall fails to reveal any obvious abnormalities. Nonlabored breathing, symmetric expansion.  Breast:  Right:  Examination of right breast fails to reveal any dominant masses or areas of significant focal nodularity. The nipple is everted without evidence of discharge. There is no skin dimpling with movement of the pectoralis. There is no significant skin changes overlying the breast.   Left:  Well healed excisional biopsy scar in the lower outer quadrant. The nipple is everted without evidence of discharge. There is no skin dimpling with movement of the pectoralis. There are no significant skin changes overlying the breast.  Abdomen: The abdomen is soft, flat, nontender and nondistended.  Integumentary: no rashes or skin lesions present          Assessment and Plan:     Patient Active Problem List   Diagnosis    Benign essential hypertension    Fibromyositis    Degeneration of intervertebral disc of cervical region    Hyperlipidemia    Heart valve disease    Osteoarthritis    Positive antinuclear antibody    Adolescent idiopathic scoliosis of thoracolumbar region    History of kidney stones    Atypical ductal hyperplasia of left breast    Acute cystitis with hematuria     Other chest pain    Pharyngitis    Abnormal computed tomography scan    Dyslipidemia    HTN (hypertension)    Systemic lupus erythematosus    Rheumatoid arthritis, involving unspecified site, unspecified whether rheumatoid factor present    Hyperparathyroidism, unspecified    Abdominal pain      Eusebia was seen today for follow-up.    Diagnoses and all orders for this visit:    At high risk for breast cancer  -     MRI Screening Breast W/WO Contrast, W/CAD, Eddie; Future    Screening mammogram for breast cancer  -     Mammo Digital Screening Bilat w/ Davion (XPD); Future    Atypical ductal hyperplasia of left breast    Complex sclerosing lesion of left breast        Plan:   Lifestyle - Healthy lifestyle guidelines were reviewed. She was encouraged to engage in regular exercise, maintain a healthy body weight, and avoid excessive alcohol consumption. Healthy nutritional guidelines were also discussed. Self-breast examination was reviewed with the patient in detail and she was encouraged to perform this on a monthly basis.  Mastalgia - I discussed ways to reduce breast pain/tenderness. Avoid caffeine (coffee, teas, chocolate, sodas). Drinking alcohol may also increase the risk of fibrocystic changes and breast pain. I also advised to wear a good, supportive bra both day and night when symptoms are worse. Avoid contact sports and other activities which could cause injury to the breasts. She may also consider taking Vitamin E/Primrose Oil supplementation to reduce pain.  She may consider Voltaren gel as needed for breast pain.  3. Screening - SC MG in February 2026 and Breast MRI in August 2026.   4. Prevention - She met with Dr. Dee and declined chemoprevention with AI or Tamoxifen. She was noted to have a low BMD, and Dr. Dailey started her on Evista, which treats low BMD and also is shown to decrease risk of invasive breast cancer in women at high risk although less effective than Tamoxifen. She did not tolerate the  medication well so it was discontinued. She elected to continue high risk screening alone.  5. Follow up - RTC in 1 year.   6. Other -    Continue to see OB/GYN for CBE. Recommend two CBE a year. One with us and one with your OB/GYN Provider. We recommend them to be at a six month interval.       All of her questions were answered. She was advised to call if she develops any questions or concerns.    Barbi Cardona PA-C         Total time on the date of the visit ranged from 30-39 mins ([id number]). Total time includes both face-to-face and non-face-to-face time personally spent by myself on the day of the visit.    Non-face-to-face time included:  _X_ preparing to see the patient such as reviewing the patient record  __ obtaining and reviewing separately obtained history  _X_ independently interpreting results  _X_ documenting clinical information in electronic health record.    Face-to-face time included:  _X_ performing an appropriate history and examination  _X_ communicating results to the patient  _X_ counseling and educating the patient  __ ordering appropriate medications  _x_ ordering appropriate tests  _X_ ordering appropriate procedures (including follow-up)  _X_ answering any questions the patient had    Total Time spent on date of visit: 35 minutes

## 2025-07-30 ENCOUNTER — OFFICE VISIT (OUTPATIENT)
Dept: SURGERY | Facility: CLINIC | Age: 65
End: 2025-07-30
Payer: MEDICARE

## 2025-07-30 VITALS
HEIGHT: 61 IN | TEMPERATURE: 99 F | SYSTOLIC BLOOD PRESSURE: 108 MMHG | BODY MASS INDEX: 28.06 KG/M2 | WEIGHT: 148.63 LBS | HEART RATE: 75 BPM | DIASTOLIC BLOOD PRESSURE: 78 MMHG | RESPIRATION RATE: 18 BRPM | OXYGEN SATURATION: 97 %

## 2025-07-30 DIAGNOSIS — Z91.89 AT HIGH RISK FOR BREAST CANCER: Primary | ICD-10-CM

## 2025-07-30 DIAGNOSIS — N60.92 ATYPICAL DUCTAL HYPERPLASIA OF LEFT BREAST: ICD-10-CM

## 2025-07-30 DIAGNOSIS — Z12.31 SCREENING MAMMOGRAM FOR BREAST CANCER: ICD-10-CM

## 2025-07-30 DIAGNOSIS — N64.89 COMPLEX SCLEROSING LESION OF LEFT BREAST: ICD-10-CM

## 2025-07-30 PROCEDURE — 3008F BODY MASS INDEX DOCD: CPT | Mod: CPTII,S$GLB,,

## 2025-07-30 PROCEDURE — 99214 OFFICE O/P EST MOD 30 MIN: CPT | Mod: S$GLB,,,

## 2025-07-30 PROCEDURE — 3078F DIAST BP <80 MM HG: CPT | Mod: CPTII,S$GLB,,

## 2025-07-30 PROCEDURE — 1160F RVW MEDS BY RX/DR IN RCRD: CPT | Mod: CPTII,S$GLB,,

## 2025-07-30 PROCEDURE — 4010F ACE/ARB THERAPY RXD/TAKEN: CPT | Mod: CPTII,S$GLB,,

## 2025-07-30 PROCEDURE — 1159F MED LIST DOCD IN RCRD: CPT | Mod: CPTII,S$GLB,,

## 2025-07-30 PROCEDURE — 3074F SYST BP LT 130 MM HG: CPT | Mod: CPTII,S$GLB,,

## 2025-07-30 PROCEDURE — 99999 PR PBB SHADOW E&M-EST. PATIENT-LVL V: CPT | Mod: PBBFAC,,,

## (undated) DEVICE — SPONGE LAP STRL 18X18IN

## (undated) DEVICE — SUPPORT ULNA NERVE PROTECTOR

## (undated) DEVICE — GLOVE PROTEXIS PI SYN SURG 6.0

## (undated) DEVICE — SOL IRRI STRL WATER 1000ML

## (undated) DEVICE — SUT MCRYL PLUS 3-0 PS2 27IN

## (undated) DEVICE — DRESSING TRANS 4X4 TEGADERM

## (undated) DEVICE — NDL SYR 10ML 18X1.5 LL BLUNT

## (undated) DEVICE — GAUZE SPONGE 4X4 12PLY

## (undated) DEVICE — ADHESIVE DERMABOND ADVANCED

## (undated) DEVICE — DRAPE FLUID WARMER ORS 44X44IN

## (undated) DEVICE — GOWN X-LG STERILE BACK

## (undated) DEVICE — CLOSURE SKIN STERI STRIP 1/2X4

## (undated) DEVICE — DRESSING TRANS 4X4 3/4

## (undated) DEVICE — ELECTRODE REM POLYHESIVE II

## (undated) DEVICE — GLOVE 6.0 PROTEXIS PI MICRO

## (undated) DEVICE — GLOVE PROTEXIS BLUE LATEX 7

## (undated) DEVICE — SUT 2/0 30IN SILK BLK BRAI

## (undated) DEVICE — GLOVE PROTEXIS PI SYN SURG 6.5

## (undated) DEVICE — ILLUMINATOR PHOTONBLADE 8/11IN

## (undated) DEVICE — COVER PROBE WITH BAND 6X96IN

## (undated) DEVICE — Device

## (undated) DEVICE — NDL HYPO REG 25G X 1 1/2

## (undated) DEVICE — SUT STRATAFIX 4-0 30CM PS-2

## (undated) DEVICE — GLOVE PROTEXIS LTX MICRO  7

## (undated) DEVICE — CHARM MARGINMAP SPEC 5MM

## (undated) DEVICE — FLEXFIT ULTRA-HIGH COVERAGE BRA